# Patient Record
Sex: MALE | Race: WHITE | Employment: OTHER | URBAN - METROPOLITAN AREA
[De-identification: names, ages, dates, MRNs, and addresses within clinical notes are randomized per-mention and may not be internally consistent; named-entity substitution may affect disease eponyms.]

---

## 2020-01-17 ENCOUNTER — HOSPITAL ENCOUNTER (INPATIENT)
Age: 70
LOS: 2 days | Discharge: HOME | DRG: 812 | End: 2020-01-19
Attending: EMERGENCY MEDICINE | Admitting: INTERNAL MEDICINE
Payer: COMMERCIAL

## 2020-01-17 ENCOUNTER — APPOINTMENT (OUTPATIENT)
Dept: GENERAL RADIOLOGY | Age: 70
DRG: 812 | End: 2020-01-17
Payer: COMMERCIAL

## 2020-01-17 PROBLEM — D64.9 ANEMIA: Status: ACTIVE | Noted: 2020-01-17

## 2020-01-17 LAB
ABO/RH: NORMAL
ANION GAP SERPL CALCULATED.3IONS-SCNC: 18 MMOL/L (ref 7–16)
ANISOCYTOSIS: ABNORMAL
ANTIBODY SCREEN: NORMAL
BASOPHILS ABSOLUTE: 0.15 E9/L (ref 0–0.2)
BASOPHILS RELATIVE PERCENT: 1.7 % (ref 0–2)
BLOOD BANK DISPENSE STATUS: NORMAL
BLOOD BANK PRODUCT CODE: NORMAL
BPU ID: NORMAL
BUN BLDV-MCNC: 18 MG/DL (ref 8–23)
CALCIUM SERPL-MCNC: 9 MG/DL (ref 8.6–10.2)
CHLORIDE BLD-SCNC: 99 MMOL/L (ref 98–107)
CO2: 20 MMOL/L (ref 22–29)
CREAT SERPL-MCNC: 0.8 MG/DL (ref 0.7–1.2)
DESCRIPTION BLOOD BANK: NORMAL
EOSINOPHILS ABSOLUTE: 0.08 E9/L (ref 0.05–0.5)
EOSINOPHILS RELATIVE PERCENT: 0.9 % (ref 0–6)
FERRITIN: 20 NG/ML
GFR AFRICAN AMERICAN: >60
GFR NON-AFRICAN AMERICAN: >60 ML/MIN/1.73
GLUCOSE BLD-MCNC: 242 MG/DL (ref 74–99)
HCT VFR BLD CALC: 24.7 % (ref 37–54)
HCT VFR BLD CALC: 24.8 % (ref 37–54)
HCT VFR BLD CALC: 28.3 % (ref 37–54)
HEMOGLOBIN: 7 G/DL (ref 12.5–16.5)
HEMOGLOBIN: 8.2 G/DL (ref 12.5–16.5)
HYPOCHROMIA: ABNORMAL
IMMATURE RETIC FRACT: 31.5 % (ref 2.3–13.4)
IRON SATURATION: 9 % (ref 20–55)
IRON: 32 MCG/DL (ref 59–158)
LACTATE DEHYDROGENASE: 178 U/L (ref 135–225)
LYMPHOCYTES ABSOLUTE: 1.23 E9/L (ref 1.5–4)
LYMPHOCYTES RELATIVE PERCENT: 13.9 % (ref 20–42)
MCH RBC QN AUTO: 20.8 PG (ref 26–35)
MCHC RBC AUTO-ENTMCNC: 28.3 % (ref 32–34.5)
MCV RBC AUTO: 73.5 FL (ref 80–99.9)
METER GLUCOSE: 128 MG/DL (ref 74–99)
MONOCYTES ABSOLUTE: 0.62 E9/L (ref 0.1–0.95)
MONOCYTES RELATIVE PERCENT: 7 % (ref 2–12)
NEUTROPHILS ABSOLUTE: 6.78 E9/L (ref 1.8–7.3)
NEUTROPHILS RELATIVE PERCENT: 76.5 % (ref 43–80)
OVALOCYTES: ABNORMAL
PDW BLD-RTO: 21.4 FL (ref 11.5–15)
PLATELET # BLD: 314 E9/L (ref 130–450)
PMV BLD AUTO: 9.3 FL (ref 7–12)
POIKILOCYTES: ABNORMAL
POLYCHROMASIA: ABNORMAL
POTASSIUM REFLEX MAGNESIUM: 4.6 MMOL/L (ref 3.5–5)
PRO-BNP: 288 PG/ML (ref 0–125)
RBC # BLD: 3.36 E12/L (ref 3.8–5.8)
RETIC HGB EQUIVALENT: 18.5 PG (ref 28.2–36.6)
RETICULOCYTE ABSOLUTE COUNT: 0.06 E12/L
RETICULOCYTE COUNT PCT: 1.8 % (ref 0.4–1.9)
SODIUM BLD-SCNC: 137 MMOL/L (ref 132–146)
TARGET CELLS: ABNORMAL
TEAR DROP CELLS: ABNORMAL
TOTAL IRON BINDING CAPACITY: 351 MCG/DL (ref 250–450)
TRANSFERRIN: 285 MG/DL (ref 200–360)
TROPONIN: <0.01 NG/ML (ref 0–0.03)
WBC # BLD: 8.8 E9/L (ref 4.5–11.5)

## 2020-01-17 PROCEDURE — 85014 HEMATOCRIT: CPT

## 2020-01-17 PROCEDURE — 86923 COMPATIBILITY TEST ELECTRIC: CPT

## 2020-01-17 PROCEDURE — 6360000002 HC RX W HCPCS: Performed by: INTERNAL MEDICINE

## 2020-01-17 PROCEDURE — 96374 THER/PROPH/DIAG INJ IV PUSH: CPT

## 2020-01-17 PROCEDURE — 1200000000 HC SEMI PRIVATE

## 2020-01-17 PROCEDURE — 71045 X-RAY EXAM CHEST 1 VIEW: CPT

## 2020-01-17 PROCEDURE — 36430 TRANSFUSION BLD/BLD COMPNT: CPT

## 2020-01-17 PROCEDURE — 85025 COMPLETE CBC W/AUTO DIFF WBC: CPT

## 2020-01-17 PROCEDURE — 36415 COLL VENOUS BLD VENIPUNCTURE: CPT

## 2020-01-17 PROCEDURE — 82746 ASSAY OF FOLIC ACID SERUM: CPT

## 2020-01-17 PROCEDURE — 84484 ASSAY OF TROPONIN QUANT: CPT

## 2020-01-17 PROCEDURE — 86901 BLOOD TYPING SEROLOGIC RH(D): CPT

## 2020-01-17 PROCEDURE — 82728 ASSAY OF FERRITIN: CPT

## 2020-01-17 PROCEDURE — 99285 EMERGENCY DEPT VISIT HI MDM: CPT

## 2020-01-17 PROCEDURE — 83550 IRON BINDING TEST: CPT

## 2020-01-17 PROCEDURE — 85018 HEMOGLOBIN: CPT

## 2020-01-17 PROCEDURE — P9016 RBC LEUKOCYTES REDUCED: HCPCS

## 2020-01-17 PROCEDURE — 83880 ASSAY OF NATRIURETIC PEPTIDE: CPT

## 2020-01-17 PROCEDURE — C9113 INJ PANTOPRAZOLE SODIUM, VIA: HCPCS | Performed by: EMERGENCY MEDICINE

## 2020-01-17 PROCEDURE — 83010 ASSAY OF HAPTOGLOBIN QUANT: CPT

## 2020-01-17 PROCEDURE — 6360000002 HC RX W HCPCS: Performed by: EMERGENCY MEDICINE

## 2020-01-17 PROCEDURE — 2580000003 HC RX 258: Performed by: INTERNAL MEDICINE

## 2020-01-17 PROCEDURE — 2580000003 HC RX 258: Performed by: EMERGENCY MEDICINE

## 2020-01-17 PROCEDURE — 80048 BASIC METABOLIC PNL TOTAL CA: CPT

## 2020-01-17 PROCEDURE — 93005 ELECTROCARDIOGRAM TRACING: CPT | Performed by: EMERGENCY MEDICINE

## 2020-01-17 PROCEDURE — 83615 LACTATE (LD) (LDH) ENZYME: CPT

## 2020-01-17 PROCEDURE — 82962 GLUCOSE BLOOD TEST: CPT

## 2020-01-17 PROCEDURE — 82607 VITAMIN B-12: CPT

## 2020-01-17 PROCEDURE — 86850 RBC ANTIBODY SCREEN: CPT

## 2020-01-17 PROCEDURE — 6370000000 HC RX 637 (ALT 250 FOR IP): Performed by: INTERNAL MEDICINE

## 2020-01-17 PROCEDURE — C9113 INJ PANTOPRAZOLE SODIUM, VIA: HCPCS | Performed by: INTERNAL MEDICINE

## 2020-01-17 PROCEDURE — 84466 ASSAY OF TRANSFERRIN: CPT

## 2020-01-17 PROCEDURE — 85045 AUTOMATED RETICULOCYTE COUNT: CPT

## 2020-01-17 PROCEDURE — 83540 ASSAY OF IRON: CPT

## 2020-01-17 PROCEDURE — 2580000003 HC RX 258

## 2020-01-17 PROCEDURE — 86900 BLOOD TYPING SEROLOGIC ABO: CPT

## 2020-01-17 RX ORDER — ASPIRIN 81 MG/1
81 TABLET, CHEWABLE ORAL DAILY
Status: ON HOLD | COMMUNITY
End: 2021-11-02 | Stop reason: HOSPADM

## 2020-01-17 RX ORDER — DEXTROSE MONOHYDRATE 50 MG/ML
100 INJECTION, SOLUTION INTRAVENOUS PRN
Status: DISCONTINUED | OUTPATIENT
Start: 2020-01-17 | End: 2020-01-19 | Stop reason: HOSPADM

## 2020-01-17 RX ORDER — ENALAPRIL MALEATE 20 MG/1
20 TABLET ORAL DAILY
COMMUNITY
End: 2020-01-17 | Stop reason: DRUGHIGH

## 2020-01-17 RX ORDER — 0.9 % SODIUM CHLORIDE 0.9 %
20 INTRAVENOUS SOLUTION INTRAVENOUS ONCE
Status: DISCONTINUED | OUTPATIENT
Start: 2020-01-17 | End: 2020-01-19 | Stop reason: HOSPADM

## 2020-01-17 RX ORDER — 0.9 % SODIUM CHLORIDE 0.9 %
500 INTRAVENOUS SOLUTION INTRAVENOUS ONCE
Status: COMPLETED | OUTPATIENT
Start: 2020-01-17 | End: 2020-01-17

## 2020-01-17 RX ORDER — SODIUM CHLORIDE 9 MG/ML
INJECTION, SOLUTION INTRAVENOUS
Status: COMPLETED
Start: 2020-01-17 | End: 2020-01-17

## 2020-01-17 RX ORDER — GLIPIZIDE 10 MG/1
10 TABLET ORAL DAILY
Status: ON HOLD | COMMUNITY
End: 2021-11-02 | Stop reason: SDUPTHER

## 2020-01-17 RX ORDER — LANOLIN ALCOHOL/MO/W.PET/CERES
6 CREAM (GRAM) TOPICAL NIGHTLY
Status: DISCONTINUED | OUTPATIENT
Start: 2020-01-17 | End: 2020-01-19 | Stop reason: HOSPADM

## 2020-01-17 RX ORDER — GLIPIZIDE 5 MG/1
10 TABLET ORAL DAILY
Status: DISCONTINUED | OUTPATIENT
Start: 2020-01-18 | End: 2020-01-19 | Stop reason: HOSPADM

## 2020-01-17 RX ORDER — ONDANSETRON 2 MG/ML
4 INJECTION INTRAMUSCULAR; INTRAVENOUS EVERY 6 HOURS PRN
Status: DISCONTINUED | OUTPATIENT
Start: 2020-01-17 | End: 2020-01-19 | Stop reason: HOSPADM

## 2020-01-17 RX ORDER — ENALAPRIL MALEATE 5 MG/1
5 TABLET ORAL DAILY
Status: DISCONTINUED | OUTPATIENT
Start: 2020-01-18 | End: 2020-01-19 | Stop reason: HOSPADM

## 2020-01-17 RX ORDER — AMLODIPINE BESYLATE 10 MG/1
10 TABLET ORAL DAILY
Status: ON HOLD | COMMUNITY
End: 2021-11-02 | Stop reason: HOSPADM

## 2020-01-17 RX ORDER — SODIUM CHLORIDE 0.9 % (FLUSH) 0.9 %
10 SYRINGE (ML) INJECTION EVERY 12 HOURS SCHEDULED
Status: DISCONTINUED | OUTPATIENT
Start: 2020-01-17 | End: 2020-01-19 | Stop reason: HOSPADM

## 2020-01-17 RX ORDER — PANTOPRAZOLE SODIUM 40 MG/10ML
40 INJECTION, POWDER, LYOPHILIZED, FOR SOLUTION INTRAVENOUS DAILY
Status: DISCONTINUED | OUTPATIENT
Start: 2020-01-17 | End: 2020-01-17

## 2020-01-17 RX ORDER — ATORVASTATIN CALCIUM 10 MG/1
10 TABLET, FILM COATED ORAL NIGHTLY
Status: DISCONTINUED | OUTPATIENT
Start: 2020-01-17 | End: 2020-01-19 | Stop reason: HOSPADM

## 2020-01-17 RX ORDER — DEXTROSE MONOHYDRATE 25 G/50ML
12.5 INJECTION, SOLUTION INTRAVENOUS PRN
Status: DISCONTINUED | OUTPATIENT
Start: 2020-01-17 | End: 2020-01-19 | Stop reason: HOSPADM

## 2020-01-17 RX ORDER — ENALAPRIL MALEATE 5 MG/1
5 TABLET ORAL DAILY
Status: ON HOLD | COMMUNITY
End: 2021-11-02 | Stop reason: HOSPADM

## 2020-01-17 RX ORDER — AZATHIOPRINE 50 MG/1
50 TABLET ORAL DAILY
Status: DISCONTINUED | OUTPATIENT
Start: 2020-01-18 | End: 2020-01-19 | Stop reason: HOSPADM

## 2020-01-17 RX ORDER — METOPROLOL SUCCINATE 100 MG/1
100 TABLET, EXTENDED RELEASE ORAL DAILY
COMMUNITY

## 2020-01-17 RX ORDER — ACETAMINOPHEN 325 MG/1
650 TABLET ORAL EVERY 4 HOURS PRN
Status: DISCONTINUED | OUTPATIENT
Start: 2020-01-17 | End: 2020-01-19 | Stop reason: HOSPADM

## 2020-01-17 RX ORDER — METOPROLOL SUCCINATE 100 MG/1
100 TABLET, EXTENDED RELEASE ORAL DAILY
Status: DISCONTINUED | OUTPATIENT
Start: 2020-01-18 | End: 2020-01-19 | Stop reason: HOSPADM

## 2020-01-17 RX ORDER — NICOTINE POLACRILEX 4 MG
15 LOZENGE BUCCAL PRN
Status: DISCONTINUED | OUTPATIENT
Start: 2020-01-17 | End: 2020-01-19 | Stop reason: HOSPADM

## 2020-01-17 RX ORDER — CHOLECALCIFEROL (VITAMIN D3) 125 MCG
5 CAPSULE ORAL NIGHTLY
COMMUNITY

## 2020-01-17 RX ORDER — SODIUM CHLORIDE 9 MG/ML
INJECTION, SOLUTION INTRAVENOUS
Status: DISPENSED
Start: 2020-01-17 | End: 2020-01-18

## 2020-01-17 RX ORDER — SODIUM CHLORIDE 0.9 % (FLUSH) 0.9 %
10 SYRINGE (ML) INJECTION PRN
Status: DISCONTINUED | OUTPATIENT
Start: 2020-01-17 | End: 2020-01-19 | Stop reason: HOSPADM

## 2020-01-17 RX ORDER — AMLODIPINE BESYLATE 10 MG/1
10 TABLET ORAL DAILY
Status: DISCONTINUED | OUTPATIENT
Start: 2020-01-18 | End: 2020-01-19 | Stop reason: HOSPADM

## 2020-01-17 RX ADMIN — SODIUM CHLORIDE 8 MG/HR: 9 INJECTION, SOLUTION INTRAVENOUS at 20:54

## 2020-01-17 RX ADMIN — METFORMIN HYDROCHLORIDE 500 MG: 500 TABLET ORAL at 18:32

## 2020-01-17 RX ADMIN — SODIUM CHLORIDE: 900 INJECTION, SOLUTION INTRAVENOUS at 19:10

## 2020-01-17 RX ADMIN — SODIUM CHLORIDE 500 ML: 9 INJECTION, SOLUTION INTRAVENOUS at 15:18

## 2020-01-17 RX ADMIN — ATORVASTATIN CALCIUM 10 MG: 10 TABLET, FILM COATED ORAL at 20:55

## 2020-01-17 RX ADMIN — PANTOPRAZOLE SODIUM 40 MG: 40 INJECTION, POWDER, FOR SOLUTION INTRAVENOUS at 15:21

## 2020-01-17 RX ADMIN — MELATONIN 6 MG: at 20:55

## 2020-01-17 RX ADMIN — SODIUM CHLORIDE, PRESERVATIVE FREE 10 ML: 5 INJECTION INTRAVENOUS at 20:59

## 2020-01-17 ASSESSMENT — ENCOUNTER SYMPTOMS
NAUSEA: 0
BACK PAIN: 0
SHORTNESS OF BREATH: 1

## 2020-01-17 ASSESSMENT — PAIN SCALES - GENERAL
PAINLEVEL_OUTOF10: 0
PAINLEVEL_OUTOF10: 0

## 2020-01-17 NOTE — H&P
[facility]  Internal Medicine  -Resident History & Physical-    PCP:  Loren Herbert DO  Admitting Physician:  Faith Johnson DO  Consultants:  GI  Chief Complaint:    Chief Complaint   Patient presents with    Other     low hgb from lab draw this am-per pt 7.7       History of Present Illness  Yane Peña is a 81 y.o. male who presents to [facility] ER complaining of anemia. Yane Peña has a past medical history that includes coronary artery disease, hypertension, hyperlipidemia, non-insulin-dependent diabetes mellitus. Harrison Jones presents the ER with anemia. He states he went to see his PCP for progressive fatigue and shortness of breath with exertion and had labs drawn. He says this has been progressively worsening over the past year. He was told to go to ER due to his hemoglobin being 7.2. He does have chronic back pain and states he takes Aleve every night for the past 2 years. He has never had a colonoscopy or EGD. He does not complain of any abdominal pain, hematochezia, melena, or hematuria. He is on aspirin and Plavix secondary to coronary artery disease s/p stenting. Admits to occasional alcohol use. He states that he stopped using tobacco.  Labs today showed hemoglobin of 7.0 with MCV of 73.5. Guaiac negative in ER. ER Course  Upon presentation to the ER, routine labwork was performed which revealed hemoglobin of 7.0, MCV 73.5. Imaging results are as outlined below in the Imaging section of this note. EKG revealed normal sinus rhythm. Upon arrival to the ER, patient was 45/76. The patient received 1unit PRBC in the emergency room and was admitted to telemetry under the care of Dr. Nacho Caruso.     Last Hospital Admission -   None in EMR    Last Echocardiogram -  None in EMR     ED TRIAGE VITALS  BP: 139/68, Temp: 98.4 °F (36.9 °C), Pulse: 83, Resp: 14, SpO2: 94 %    Vitals:    01/17/20 1421 01/17/20 1632   BP: (!) 145/76 139/68   Pulse: 97 83   Resp: 18 14   Temp: 450 E9/L    MPV 9.3 7.0 - 12.0 fL    Neutrophils % 76.5 43.0 - 80.0 %    Lymphocytes % 13.9 (L) 20.0 - 42.0 %    Monocytes % 7.0 2.0 - 12.0 %    Eosinophils % 0.9 0.0 - 6.0 %    Basophils % 1.7 0.0 - 2.0 %    Neutrophils Absolute 6.78 1.80 - 7.30 E9/L    Lymphocytes Absolute 1.23 (L) 1.50 - 4.00 E9/L    Monocytes Absolute 0.62 0.10 - 0.95 E9/L    Eosinophils Absolute 0.08 0.05 - 0.50 E9/L    Basophils Absolute 0.15 0.00 - 0.20 E9/L    Anisocytosis 2+     Polychromasia 2+     Hypochromia 3+     Poikilocytes 1+     Ovalocytes 1+     Target Cells 1+     Tear Drop Cells 1+    Basic Metabolic Panel w/ Reflex to MG    Collection Time: 01/17/20  2:50 PM   Result Value Ref Range    Sodium 137 132 - 146 mmol/L    Potassium reflex Magnesium 4.6 3.5 - 5.0 mmol/L    Chloride 99 98 - 107 mmol/L    CO2 20 (L) 22 - 29 mmol/L    Anion Gap 18 (H) 7 - 16 mmol/L    Glucose 242 (H) 74 - 99 mg/dL    BUN 18 8 - 23 mg/dL    CREATININE 0.8 0.7 - 1.2 mg/dL    GFR Non-African American >60 >=60 mL/min/1.73    GFR African American >60     Calcium 9.0 8.6 - 10.2 mg/dL   Troponin    Collection Time: 01/17/20  2:50 PM   Result Value Ref Range    Troponin <0.01 0.00 - 0.03 ng/mL   Brain Natriuretic Peptide    Collection Time: 01/17/20  2:50 PM   Result Value Ref Range    Pro- (H) 0 - 125 pg/mL   TYPE AND SCREEN    Collection Time: 01/17/20  2:50 PM   Result Value Ref Range    ABO/Rh A POS     Antibody Screen NEG    PREPARE RBC (CROSSMATCH), 1 Units    Collection Time: 01/17/20  2:50 PM   Result Value Ref Range    Product Code Blood Bank [id number]     Description Blood Bank Red Blood Cells, Leuko-reduced     Unit Number [id number]     Dispense Status Blood Bank issued    EKG 12 Lead    Collection Time: 01/17/20  3:01 PM   Result Value Ref Range    Ventricular Rate 85 BPM    Atrial Rate 85 BPM    P-R Interval 162 ms    QRS Duration 76 ms    Q-T Interval 372 ms    QTc Calculation (Bazett) 442 ms    P Axis 66 degrees    R Axis 66 degrees    T Axis 73 degrees       Imaging  Xr Chest Portable    Result Date: 1/17/2020  Location:200 Exam: XR CHEST PORTABLE Comparison: 5/22/2013 History: Dyspnea Findings: Portable AP upright chest. Heart size is normal. Pulmonary vessels are nondistended. No focal pulmonary parenchymal consolidation. No acute cardiopulmonary disease. Assessment and Plan  Patient is a 81 y.o. male who presented with fatigue and SOB on exertion. The active problem list is as follows:    · Anemia, possible GI source, with frequent Aleve use  · Coronary artery disease, on aspirin and plavix  · Hypertension  · Hyperlipidemia  · Non insulin dependent diabetes mellitus    -Patient has never had EGD or colonoscopy  -Hgb 7.0 on presentation  -1 units PRBC given in ED  -H&H q6 for now  -Transfuse as necessary (hgb <7)  -NPO  -Protonix drip  -Consult gastroenterology  -Last plavix 1/17    · Routine labs in the morning. · DVT prophylaxis. SCD  · Please see orders for further management and care. The plan to be discussed with Dr. Camryn Morocho. [facility] Tova OSORIO, PGYIII  4:40 PM  1/17/2020    The chart was reviewed and the patient was seen and examined. The case was discussed in detail with the resident and agree with current impressions and plan.     Yomaira Dupree D.O.  5:20 PM  1/17/2020

## 2020-01-17 NOTE — ED NOTES
Patient has no complaints or symptoms at this time.  Here for low Hgb, sent in by PCP       Pasquale Mays RN  01/17/20 9263

## 2020-01-17 NOTE — ED PROVIDER NOTES
and nursing note reviewed. Constitutional:       General: He is not in acute distress. Appearance: He is well-developed. HENT:      Head: Normocephalic and atraumatic. Mouth/Throat:      Mouth: Mucous membranes are dry. Eyes:      General: No scleral icterus. Extraocular Movements: Extraocular movements intact. Pupils: Pupils are equal, round, and reactive to light. Neck:      Musculoskeletal: Normal range of motion. Vascular: No JVD. Cardiovascular:      Rate and Rhythm: Normal rate and regular rhythm. Heart sounds: No murmur. Pulmonary:      Effort: Pulmonary effort is normal.      Breath sounds: Normal breath sounds. No wheezing, rhonchi or rales. Chest:      Chest wall: No tenderness. Abdominal:      General: There is no distension. Palpations: Abdomen is soft. Tenderness: There is no tenderness. There is no guarding or rebound. Hernia: No hernia is present. Genitourinary:     Rectum: Guaiac result negative. Comments: Brown stool  Musculoskeletal:      Right lower leg: No edema. Left lower leg: No edema. Skin:     General: Skin is warm and dry. Capillary Refill: Capillary refill takes less than 2 seconds. Neurological:      General: No focal deficit present. Mental Status: He is alert and oriented to person, place, and time. Cranial Nerves: No cranial nerve deficit. Psychiatric:         Behavior: Behavior normal.          Procedures     MDM  Number of Diagnoses or Management Options  Anemia, unspecified type:   Diagnosis management comments: Patient presented to the ED at the discretion of his PCP after he was found to have a low hemoglobin. Patient had been complaining of shortness of breath and fatigue for the past several days. Patient had a hemoglobin of 7.0 and was treated with one unit of pRBCs. The patient was guiac negative and was also given IV fluids and protonix. Case was discussed with Dr. Duane Gals.   His wife stated that the patient did have a slightly elevated d-dimer at 294 although I suspect that his clinical picture is more consistent with anemia in his fatigue rather than a pulmonary embolism. --------------------------------------------- PAST HISTORY ---------------------------------------------  Past Medical History:  has a past medical history of Diabetes mellitus ([facility] [address] Hepatitis C antibody test positive, Hyperlipidemia, Hypertension, and MI, old. Past Surgical History:  has a past surgical history that includes Coronary angioplasty with stent. Social History:  reports that he has quit smoking. He smoked 1.00 pack per day. He does not have any smokeless tobacco history on file. He reports current alcohol use. Family History: family history is not on file. The patients home medications have been reviewed.     Allergies: Codeine    -------------------------------------------------- RESULTS -------------------------------------------------    LABS:  Results for orders placed or performed during the hospital encounter of 01/17/20   CBC Auto Differential   Result Value Ref Range    WBC 8.8 4.5 - 11.5 E9/L    RBC 3.36 (L) 3.80 - 5.80 E12/L    Hemoglobin 7.0 (L) 12.5 - 16.5 g/dL    Hematocrit 24.7 (L) 37.0 - 54.0 %    MCV 73.5 (L) 80.0 - 99.9 fL    MCH 20.8 (L) 26.0 - 35.0 pg    MCHC 28.3 (L) 32.0 - 34.5 %    RDW 21.4 (H) 11.5 - 15.0 fL    Platelets 547 939 - 503 E9/L    MPV 9.3 7.0 - 12.0 fL    Neutrophils % 76.5 43.0 - 80.0 %    Lymphocytes % 13.9 (L) 20.0 - 42.0 %    Monocytes % 7.0 2.0 - 12.0 %    Eosinophils % 0.9 0.0 - 6.0 %    Basophils % 1.7 0.0 - 2.0 %    Neutrophils Absolute 6.78 1.80 - 7.30 E9/L    Lymphocytes Absolute 1.23 (L) 1.50 - 4.00 E9/L    Monocytes Absolute 0.62 0.10 - 0.95 E9/L    Eosinophils Absolute 0.08 0.05 - 0.50 E9/L    Basophils Absolute 0.15 0.00 - 0.20 E9/L    Anisocytosis 2+     Polychromasia 2+     Hypochromia 3+     Poikilocytes 1+     Ovalocytes 1+

## 2020-01-18 ENCOUNTER — ANESTHESIA EVENT (OUTPATIENT)
Dept: ENDOSCOPY | Age: 70
DRG: 812 | End: 2020-01-18
Payer: COMMERCIAL

## 2020-01-18 LAB
ALBUMIN SERPL-MCNC: 3.5 G/DL (ref 3.5–5.2)
ALP BLD-CCNC: 196 U/L (ref 40–129)
ALT SERPL-CCNC: 22 U/L (ref 0–40)
ANION GAP SERPL CALCULATED.3IONS-SCNC: 12 MMOL/L (ref 7–16)
APTT: 27.8 SEC (ref 24.5–35.1)
AST SERPL-CCNC: 49 U/L (ref 0–39)
BILIRUB SERPL-MCNC: 1.1 MG/DL (ref 0–1.2)
BUN BLDV-MCNC: 11 MG/DL (ref 8–23)
CALCIUM SERPL-MCNC: 8.7 MG/DL (ref 8.6–10.2)
CHLORIDE BLD-SCNC: 102 MMOL/L (ref 98–107)
CHOLESTEROL, TOTAL: 218 MG/DL (ref 0–199)
CO2: 24 MMOL/L (ref 22–29)
CREAT SERPL-MCNC: 0.7 MG/DL (ref 0.7–1.2)
FOLATE: 5.9 NG/ML (ref 4.8–24.2)
FOLATE: 6.6 NG/ML (ref 4.8–24.2)
GFR AFRICAN AMERICAN: >60
GFR NON-AFRICAN AMERICAN: >60 ML/MIN/1.73
GLUCOSE BLD-MCNC: 102 MG/DL (ref 74–99)
HAPTOGLOBIN: 137 MG/DL (ref 30–200)
HAPTOGLOBIN: 140 MG/DL (ref 30–200)
HBA1C MFR BLD: 6.8 % (ref 4–5.6)
HBA1C MFR BLD: 6.8 % (ref 4–5.6)
HCT VFR BLD CALC: 26.7 % (ref 37–54)
HCT VFR BLD CALC: 27 % (ref 37–54)
HCT VFR BLD CALC: 27.1 % (ref 37–54)
HCT VFR BLD CALC: 27.7 % (ref 37–54)
HDLC SERPL-MCNC: 37 MG/DL
HEMOGLOBIN: 7.9 G/DL (ref 12.5–16.5)
HEMOGLOBIN: 8 G/DL (ref 12.5–16.5)
INR BLD: 1
LDL CHOLESTEROL CALCULATED: 150 MG/DL (ref 0–99)
MAGNESIUM: 1.7 MG/DL (ref 1.6–2.6)
MCH RBC QN AUTO: 21.9 PG (ref 26–35)
MCHC RBC AUTO-ENTMCNC: 29.6 % (ref 32–34.5)
MCV RBC AUTO: 74.2 FL (ref 80–99.9)
METER GLUCOSE: 101 MG/DL (ref 74–99)
METER GLUCOSE: 102 MG/DL (ref 74–99)
METER GLUCOSE: 83 MG/DL (ref 74–99)
PDW BLD-RTO: 21.4 FL (ref 11.5–15)
PHOSPHORUS: 3 MG/DL (ref 2.5–4.5)
PLATELET # BLD: 253 E9/L (ref 130–450)
PMV BLD AUTO: 9.3 FL (ref 7–12)
POTASSIUM SERPL-SCNC: 4.1 MMOL/L (ref 3.5–5)
PROTHROMBIN TIME: 11.4 SEC (ref 9.3–12.4)
RBC # BLD: 3.6 E12/L (ref 3.8–5.8)
SODIUM BLD-SCNC: 138 MMOL/L (ref 132–146)
TOTAL PROTEIN: 7.2 G/DL (ref 6.4–8.3)
TRIGL SERPL-MCNC: 156 MG/DL (ref 0–149)
TSH SERPL DL<=0.05 MIU/L-ACNC: 2.71 UIU/ML (ref 0.27–4.2)
VITAMIN B-12: 343 PG/ML (ref 211–946)
VITAMIN B-12: 373 PG/ML (ref 211–946)
VLDLC SERPL CALC-MCNC: 31 MG/DL
WBC # BLD: 6.8 E9/L (ref 4.5–11.5)

## 2020-01-18 PROCEDURE — 84443 ASSAY THYROID STIM HORMONE: CPT

## 2020-01-18 PROCEDURE — 80053 COMPREHEN METABOLIC PANEL: CPT

## 2020-01-18 PROCEDURE — 85014 HEMATOCRIT: CPT

## 2020-01-18 PROCEDURE — 6370000000 HC RX 637 (ALT 250 FOR IP): Performed by: INTERNAL MEDICINE

## 2020-01-18 PROCEDURE — 6360000002 HC RX W HCPCS: Performed by: INTERNAL MEDICINE

## 2020-01-18 PROCEDURE — 82607 VITAMIN B-12: CPT

## 2020-01-18 PROCEDURE — 82962 GLUCOSE BLOOD TEST: CPT

## 2020-01-18 PROCEDURE — 84100 ASSAY OF PHOSPHORUS: CPT

## 2020-01-18 PROCEDURE — 85730 THROMBOPLASTIN TIME PARTIAL: CPT

## 2020-01-18 PROCEDURE — 83010 ASSAY OF HAPTOGLOBIN QUANT: CPT

## 2020-01-18 PROCEDURE — 2580000003 HC RX 258: Performed by: INTERNAL MEDICINE

## 2020-01-18 PROCEDURE — 85027 COMPLETE CBC AUTOMATED: CPT

## 2020-01-18 PROCEDURE — 83735 ASSAY OF MAGNESIUM: CPT

## 2020-01-18 PROCEDURE — C9113 INJ PANTOPRAZOLE SODIUM, VIA: HCPCS | Performed by: INTERNAL MEDICINE

## 2020-01-18 PROCEDURE — 80061 LIPID PANEL: CPT

## 2020-01-18 PROCEDURE — 1200000000 HC SEMI PRIVATE

## 2020-01-18 PROCEDURE — 85610 PROTHROMBIN TIME: CPT

## 2020-01-18 PROCEDURE — 85018 HEMOGLOBIN: CPT

## 2020-01-18 PROCEDURE — 36415 COLL VENOUS BLD VENIPUNCTURE: CPT

## 2020-01-18 PROCEDURE — 83036 HEMOGLOBIN GLYCOSYLATED A1C: CPT

## 2020-01-18 PROCEDURE — 82746 ASSAY OF FOLIC ACID SERUM: CPT

## 2020-01-18 RX ORDER — NICOTINE POLACRILEX 4 MG
15 LOZENGE BUCCAL PRN
Status: DISCONTINUED | OUTPATIENT
Start: 2020-01-18 | End: 2020-01-19 | Stop reason: HOSPADM

## 2020-01-18 RX ORDER — DEXTROSE MONOHYDRATE 50 MG/ML
100 INJECTION, SOLUTION INTRAVENOUS PRN
Status: DISCONTINUED | OUTPATIENT
Start: 2020-01-18 | End: 2020-01-19 | Stop reason: HOSPADM

## 2020-01-18 RX ORDER — TRAMADOL HYDROCHLORIDE 50 MG/1
50 TABLET ORAL EVERY 6 HOURS PRN
Status: DISCONTINUED | OUTPATIENT
Start: 2020-01-18 | End: 2020-01-19 | Stop reason: HOSPADM

## 2020-01-18 RX ORDER — KETOROLAC TROMETHAMINE 15 MG/ML
15 INJECTION, SOLUTION INTRAMUSCULAR; INTRAVENOUS EVERY 6 HOURS PRN
Status: DISCONTINUED | OUTPATIENT
Start: 2020-01-18 | End: 2020-01-19 | Stop reason: HOSPADM

## 2020-01-18 RX ORDER — DEXTROSE MONOHYDRATE 25 G/50ML
12.5 INJECTION, SOLUTION INTRAVENOUS PRN
Status: DISCONTINUED | OUTPATIENT
Start: 2020-01-18 | End: 2020-01-19 | Stop reason: HOSPADM

## 2020-01-18 RX ORDER — SODIUM CHLORIDE 0.9 % (FLUSH) 0.9 %
10 SYRINGE (ML) INJECTION PRN
Status: DISCONTINUED | OUTPATIENT
Start: 2020-01-18 | End: 2020-01-19 | Stop reason: HOSPADM

## 2020-01-18 RX ORDER — SODIUM CHLORIDE 0.9 % (FLUSH) 0.9 %
10 SYRINGE (ML) INJECTION EVERY 12 HOURS SCHEDULED
Status: DISCONTINUED | OUTPATIENT
Start: 2020-01-18 | End: 2020-01-19 | Stop reason: HOSPADM

## 2020-01-18 RX ADMIN — TRAMADOL HYDROCHLORIDE 50 MG: 50 TABLET, FILM COATED ORAL at 17:59

## 2020-01-18 RX ADMIN — SODIUM CHLORIDE 8 MG/HR: 9 INJECTION, SOLUTION INTRAVENOUS at 13:44

## 2020-01-18 RX ADMIN — MELATONIN 6 MG: at 21:27

## 2020-01-18 RX ADMIN — METOPROLOL SUCCINATE 100 MG: 100 TABLET, EXTENDED RELEASE ORAL at 08:00

## 2020-01-18 RX ADMIN — ENALAPRIL MALEATE 5 MG: 5 TABLET ORAL at 08:00

## 2020-01-18 RX ADMIN — METFORMIN HYDROCHLORIDE 500 MG: 500 TABLET ORAL at 08:00

## 2020-01-18 RX ADMIN — AZATHIOPRINE 50 MG: 50 TABLET ORAL at 08:01

## 2020-01-18 RX ADMIN — SODIUM CHLORIDE 8 MG/HR: 9 INJECTION, SOLUTION INTRAVENOUS at 03:00

## 2020-01-18 RX ADMIN — ATORVASTATIN CALCIUM 10 MG: 10 TABLET, FILM COATED ORAL at 21:27

## 2020-01-18 RX ADMIN — GLIPIZIDE 10 MG: 5 TABLET ORAL at 08:00

## 2020-01-18 RX ADMIN — AMLODIPINE BESYLATE 10 MG: 10 TABLET ORAL at 08:00

## 2020-01-18 RX ADMIN — ACETAMINOPHEN 650 MG: 325 TABLET, FILM COATED ORAL at 08:04

## 2020-01-18 ASSESSMENT — PAIN SCALES - GENERAL
PAINLEVEL_OUTOF10: 4
PAINLEVEL_OUTOF10: 0
PAINLEVEL_OUTOF10: 8
PAINLEVEL_OUTOF10: 2
PAINLEVEL_OUTOF10: 3
PAINLEVEL_OUTOF10: 5

## 2020-01-18 ASSESSMENT — PAIN DESCRIPTION - PAIN TYPE: TYPE: ACUTE PAIN

## 2020-01-18 ASSESSMENT — PAIN DESCRIPTION - LOCATION: LOCATION: ANKLE

## 2020-01-18 NOTE — PROGRESS NOTES
than 10 feet. Temperature 98.7 with a heart rate 84. Blood pressure 137/66 but was as high as 177/76 yesterday afternoon. O2 sats 94% on room air. Hemoglobin still 7.9 with a serum iron of 82. Last Hospital Admission -   None in EMR    Last Echocardiogram -  None in EMR     ED TRIAGE VITALS  BP: 137/66, Temp: 98.7 °F (37.1 °C), Pulse: 84, Resp: 18, SpO2: 94 %    Vitals:    01/17/20 1729 01/17/20 2045 01/18/20 0601 01/18/20 0830   BP: (!) 148/70 (!) 177/76  137/66   Pulse: 83 97  84   Resp: 18 20 18   Temp: 98.1 °F (36.7 °C) 98.2 °F (36.8 °C)  98.7 °F (37.1 °C)   TempSrc: Oral      SpO2: 94% 94%  94%   Weight: 188 lb (85.3 kg)  188 lb 12.8 oz (85.6 kg)    Height: 5' 8\" (1.727 m)            Histories  Past Medical History:   Diagnosis Date    Diabetes mellitus ([facility] [address]     Hepatitis C antibody test positive     Hyperlipidemia     Hypertension     MI, old      Past Surgical History:   Procedure Laterality Date    CORONARY ANGIOPLASTY WITH STENT PLACEMENT       History reviewed. No pertinent family history. Home Medications  Prior to Admission medications    Medication Sig Start Date End Date Taking?  Authorizing Provider   aspirin 81 MG chewable tablet Take 81 mg by mouth daily   Yes Historical Provider, MD   glipiZIDE (GLUCOTROL) 10 MG tablet Take 10 mg by mouth daily    Yes Historical Provider, MD   amLODIPine (NORVASC) 10 MG tablet Take 10 mg by mouth daily   Yes Historical Provider, MD   enalapril (VASOTEC) 5 MG tablet Take 5 mg by mouth daily   Yes Historical Provider, MD   metoprolol succinate (TOPROL XL) 100 MG extended release tablet Take 100 mg by mouth daily   Yes Historical Provider, MD   melatonin 5 MG TABS tablet Take 5 mg by mouth nightly   Yes Historical Provider, MD   Multiple Vitamin (MULTI-VITAMIN DAILY PO) Take 1 tablet by mouth daily    Yes Historical Provider, MD   Coenzyme Q10 (COQ-10) 100 MG CAPS Take 100 mg by mouth nightly    Yes Historical Provider, MD   metformin (GLUCOPHAGE) 500 MG tablet Take 500 mg by mouth 2 times daily (with meals). Yes Historical Provider, MD   omeprazole (PRILOSEC) 20 MG capsule Take 20 mg by mouth daily. Yes Historical Provider, MD   azaTHIOprine (IMURAN) 50 MG tablet Take 50 mg by mouth daily. Yes Historical Provider, MD   clopidogrel (PLAVIX) 75 MG tablet Take 75 mg by mouth daily.      Yes Historical Provider, MD   atorvastatin (LIPITOR) 10 MG tablet Take 10 mg by mouth nightly    Yes Historical Provider, MD       Allergies  Codeine    Social Hx  Social History     Socioeconomic History    Marital status:      Spouse name: Not on file    Number of children: Not on file    Years of education: Not on file    Highest education level: Not on file   Occupational History    Not on file   Social Needs    Financial resource strain: Not on file    Food insecurity:     Worry: Not on file     Inability: Not on file    Transportation needs:     Medical: Not on file     Non-medical: Not on file   Tobacco Use    Smoking status: Former Smoker     Packs/day: 1.00   Substance and Sexual Activity    Alcohol use: Yes     Comment: occasional    Drug use: Not on file    Sexual activity: Not on file   Lifestyle    Physical activity:     Days per week: Not on file     Minutes per session: Not on file    Stress: Not on file   Relationships    Social connections:     Talks on phone: Not on file     Gets together: Not on file     Attends Tenriism service: Not on file     Active member of club or organization: Not on file     Attends meetings of clubs or organizations: Not on file     Relationship status: Not on file    Intimate partner violence:     Fear of current or ex partner: Not on file     Emotionally abused: Not on file     Physically abused: Not on file     Forced sexual activity: Not on file   Other Topics Concern    Not on file   Social History Narrative    Not on file       Review of Systems  All bolded are positive; please see HPI  General: Fever, chills, diaphoresis, fatigue, malaise, night sweats, weight loss  Psychological:  Anxiety, disorientation, hallucinations. ENT:  Epistaxis, headaches, vertigo, visual changes. Cardiovascular:  Chest pain, irregular heartbeats, palpitations, paroxysmal nocturnal dyspnea. Respiratory:  Shortness of breath, coughing, sputum production, hemoptysis, wheezing, orthopnea. Gastrointestinal:  Nausea, vomiting, diarrhea, heartburn, constipation, abdominal pain, hematemesis, hematochezia, melena, acholic stools  Genito-Urinary:  Dysuria, urgency, frequency, hematuria  Musculoskeletal:  Joint pain, joint stiffness, joint swelling, muscle pain  Neurology:  Headache, focal neurological deficits, weakness, numbness, paresthesia  Derm:  Rashes, ulcers, excoriations, bruising  Extremities:  Decreased ROM, peripheral edema, mottling    Physical Examination  Vitals:  /66   Pulse 84   Temp 98.7 °F (37.1 °C)   Resp 18   Ht 5' 8\" (1.727 m)   Wt 188 lb 12.8 oz (85.6 kg)   SpO2 94%   BMI 28.71 kg/m²   General Appearance:  awake, alert, and oriented to person, place, time, and purpose; appears stated age and cooperative; no apparent distress no labored breathing  HEENT:  NCAT; PERRL; conjunctiva pink, sclera clear  Neck:  no adenopathy, bruit, JVD, tenderness, masses, or nodules; supple, symmetrical, trachea midline, thyroid not enlarged  Lung:  clear to auscultation bilaterally; no use of accessory muscles; no rhonchi, rales, or crackles  Heart:  regular rate and regular rhythm without murmur, rub, or gallop  Abdomen:  soft, nontender, nondistended; normoactive bowel sounds; no organomegaly  Extremities:  extremities normal, atraumatic, no cyanosis or edema  Musculokeletal:  no joint swelling, no muscle tenderness. ROM normal in all joints of extremities.    Neurologic:  mental status A&Ox3, thought content appropriate; CN II-XII grossly intact; sensation intact, motor strength 5/5 globally; no slurred Lipid Panel    Collection Time: 01/18/20  7:41 AM   Result Value Ref Range    Cholesterol, Total 218 (H) 0 - 199 mg/dL    Triglycerides 156 (H) 0 - 149 mg/dL    HDL 37 >40 mg/dL    LDL Calculated 150 (H) 0 - 99 mg/dL    VLDL Cholesterol Calculated 31 mg/dL   Magnesium    Collection Time: 01/18/20  7:41 AM   Result Value Ref Range    Magnesium 1.7 1.6 - 2.6 mg/dL   Phosphorus    Collection Time: 01/18/20  7:41 AM   Result Value Ref Range    Phosphorus 3.0 2.5 - 4.5 mg/dL   TSH without Reflex    Collection Time: 01/18/20  7:41 AM   Result Value Ref Range    TSH 2.710 0.270 - 4.200 uIU/mL   Comprehensive Metabolic Panel    Collection Time: 01/18/20  7:41 AM   Result Value Ref Range    Sodium 138 132 - 146 mmol/L    Potassium 4.1 3.5 - 5.0 mmol/L    Chloride 102 98 - 107 mmol/L    CO2 24 22 - 29 mmol/L    Anion Gap 12 7 - 16 mmol/L    Glucose 102 (H) 74 - 99 mg/dL    BUN 11 8 - 23 mg/dL    CREATININE 0.7 0.7 - 1.2 mg/dL    GFR Non-African American >60 >=60 mL/min/1.73    GFR African American >60     Calcium 8.7 8.6 - 10.2 mg/dL    Total Protein 7.2 6.4 - 8.3 g/dL    Alb 3.5 3.5 - 5.2 g/dL    Total Bilirubin 1.1 0.0 - 1.2 mg/dL    Alkaline Phosphatase 196 (H) 40 - 129 U/L    ALT 22 0 - 40 U/L    AST 49 (H) 0 - 39 U/L   CBC    Collection Time: 01/18/20  7:41 AM   Result Value Ref Range    WBC 6.8 4.5 - 11.5 E9/L    RBC 3.60 (L) 3.80 - 5.80 E12/L    Hemoglobin 7.9 (L) 12.5 - 16.5 g/dL    Hematocrit 26.7 (L) 37.0 - 54.0 %    MCV 74.2 (L) 80.0 - 99.9 fL    MCH 21.9 (L) 26.0 - 35.0 pg    MCHC 29.6 (L) 32.0 - 34.5 %    RDW 21.4 (H) 11.5 - 15.0 fL    Platelets 702 291 - 225 E9/L    MPV 9.3 7.0 - 12.0 fL   Protime-INR    Collection Time: 01/18/20  7:41 AM   Result Value Ref Range    Protime 11.4 9.3 - 12.4 sec    INR 1.0    APTT    Collection Time: 01/18/20  7:41 AM   Result Value Ref Range    aPTT 27.8 24.5 - 35.1 sec   Hemoglobin and Hematocrit, Blood    Collection Time: 01/18/20 11:38 AM   Result Value Ref Range Hemoglobin 7.9 (L) 12.5 - 16.5 g/dL    Hematocrit 27.1 (L) 37.0 - 54.0 %       Imaging  Xr Chest Portable    Result Date: 1/17/2020  Location:200 Exam: XR CHEST PORTABLE Comparison: 5/22/2013 History: Dyspnea Findings: Portable AP upright chest. Heart size is normal. Pulmonary vessels are nondistended. No focal pulmonary parenchymal consolidation. No acute cardiopulmonary disease. Assessment and Plan  Patient is a 81 y.o. male who presented with fatigue and SOB on exertion. The active problem list is as follows:    · Anemia, possible GI source, with frequent Aleve use  · Coronary artery disease, on aspirin and plavix  · Hypertension  · Hyperlipidemia  · Non insulin dependent diabetes mellitus    -Patient has never had EGD or colonoscopy  -Hgb 7.0 on presentation  -1 units PRBC given in ED  -Repeat H&H at 8 PM today and in a.m.-there is no signs of active GI bleeding  -Transfuse as necessary (hgb <7)  -NPO  -Protonix drip  -Consult gastroenterology  -Last plavix 1/17    · Routine labs in the morning. · DVT prophylaxis. SCD  · Please see orders for further management and care.   Tavon Ortiz DO  12:42 PM  1/18/2020

## 2020-01-18 NOTE — ANESTHESIA PRE PROCEDURE
Department of Anesthesiology  Preprocedure Note       Name:  Lisa Mendieta   Age:  81 y.o.  :  1950                                          MRN:  [id number]         Date:  2020      Surgeon: Belle Collins):  Janessa Poole DO    Procedure: EGD ESOPHAGOGASTRODUODENOSCOPY (N/A )    Medications prior to admission:   Prior to Admission medications    Medication Sig Start Date End Date Taking? Authorizing Provider   aspirin 81 MG chewable tablet Take 81 mg by mouth daily   Yes Historical Provider, MD   glipiZIDE (GLUCOTROL) 10 MG tablet Take 10 mg by mouth daily    Yes Historical Provider, MD   amLODIPine (NORVASC) 10 MG tablet Take 10 mg by mouth daily   Yes Historical Provider, MD   enalapril (VASOTEC) 5 MG tablet Take 5 mg by mouth daily   Yes Historical Provider, MD   metoprolol succinate (TOPROL XL) 100 MG extended release tablet Take 100 mg by mouth daily   Yes Historical Provider, MD   melatonin 5 MG TABS tablet Take 5 mg by mouth nightly   Yes Historical Provider, MD   Multiple Vitamin (MULTI-VITAMIN DAILY PO) Take 1 tablet by mouth daily    Yes Historical Provider, MD   Coenzyme Q10 (COQ-10) 100 MG CAPS Take 100 mg by mouth nightly    Yes Historical Provider, MD   metformin (GLUCOPHAGE) 500 MG tablet Take 500 mg by mouth 2 times daily (with meals). Yes Historical Provider, MD   omeprazole (PRILOSEC) 20 MG capsule Take 20 mg by mouth daily. Yes Historical Provider, MD   azaTHIOprine (IMURAN) 50 MG tablet Take 50 mg by mouth daily. Yes Historical Provider, MD   clopidogrel (PLAVIX) 75 MG tablet Take 75 mg by mouth daily.      Yes Historical Provider, MD   atorvastatin (LIPITOR) 10 MG tablet Take 10 mg by mouth nightly    Yes Historical Provider, MD       Current medications:    Current Facility-Administered Medications   Medication Dose Route Frequency Provider Last Rate Last Dose    glucose (GLUTOSE) 40 % oral gel 15 g  15 g Oral PRN Randy Loya DO        dextrose 50 % IV solution  12.5 g Intravenous PRN Omar Jimenez, DO        glucagon (rDNA) injection 1 mg  1 mg Intramuscular PRN Omar Jimenez, DO        dextrose 5 % solution  100 mL/hr Intravenous PRN Omar Jimenez, DO        insulin lispro (HUMALOG) injection vial 0-12 Units  0-12 Units Subcutaneous TID  Omar Jimenez, DO        insulin lispro (HUMALOG) injection vial 0-6 Units  0-6 Units Subcutaneous Nightly Omar Jimenez, DO        traMADol (ULTRAM) tablet 50 mg  50 mg Oral Q6H PRN Omar Jimenez, DO   50 mg at 01/18/20 1759    ketorolac (TORADOL) injection 15 mg  15 mg Intravenous Q6H PRN Omar Jimenez, DO        sodium chloride flush 0.9 % injection 10 mL  10 mL Intravenous 2 times per day Thadeus Dapash, DO        sodium chloride flush 0.9 % injection 10 mL  10 mL Intravenous PRN Thadeus Dapash, DO        0.9 % sodium chloride bolus  20 mL Intravenous Once Elwyn Gaby, DO        amLODIPine (NORVASC) tablet 10 mg  10 mg Oral Daily [facility] Tova, DO   10 mg at 01/18/20 0800    atorvastatin (LIPITOR) tablet 10 mg  10 mg Oral Nightly [facility] Tova, DO   10 mg at 01/17/20 2055    azaTHIOprine (IMURAN) tablet 50 mg  50 mg Oral Daily [facility] Tova, DO   50 mg at 01/18/20 0801    enalapril (VASOTEC) tablet 5 mg  5 mg Oral Daily [facility] Tova, DO   5 mg at 01/18/20 0800    glipiZIDE (GLUCOTROL) tablet 10 mg  10 mg Oral Daily [facility] Tova, DO   10 mg at 01/18/20 0800    melatonin tablet 6 mg  6 mg Oral Nightly [facility] Tova, DO   6 mg at 01/17/20 2055    metFORMIN (GLUCOPHAGE) tablet 500 mg  500 mg Oral BID [facility] Tova, DO   500 mg at 01/18/20 0800    metoprolol succinate (TOPROL XL) extended release tablet 100 mg  100 mg Oral Daily [facility] Tova, DO   100 mg at 01/18/20 0800    pantoprazole (PROTONIX) 80 mg in sodium chloride 0.9 % 100 mL infusion  8 mg/hr Intravenous Continuous [facility] Tova, DO 10 mL/hr at 01/18/20 1344 8 mg/hr at 01/18/20 1344    sodium chloride flush 0.9 % injection 10 mL  10 mL Intravenous 2 times per day Jacqueline Banda, DO   10 mL at 01/17/20 2059    sodium chloride flush 0.9 % injection 10 mL  10 mL Intravenous PRN Jacqueline Banda, DO        ondansetron [facility]) injection 4 mg  4 mg Intravenous Q6H PRN Emmanuela aDnielman, DO        acetaminophen (TYLENOL) tablet 650 mg  650 mg Oral Q4H PRN Maggy Bernardo, DO   650 mg at 01/18/20 0804    glucose (GLUTOSE) 40 % oral gel 15 g  15 g Oral PRN Lacona Robyn, DO        dextrose 50 % IV solution  12.5 g Intravenous PRN [place]ona [place], DO        glucagon (rDNA) injection 1 mg  1 mg Intramuscular PRN [place]ona [place], DO        dextrose 5 % solution  100 mL/hr Intravenous PRN Lacona [place], DO           Allergies:     Allergies   Allergen Reactions    Codeine        Problem List:    Patient Active Problem List   Diagnosis Code    Anemia D64.9       Past Medical History:        Diagnosis Date    Diabetes mellitus ([facility] [address]     Hepatitis C antibody test positive     Hyperlipidemia     Hypertension     MI, old        Past Surgical History:        Procedure Laterality Date    CORONARY ANGIOPLASTY WITH STENT PLACEMENT         Social History:    Social History     Tobacco Use    Smoking status: Former Smoker     Packs/day: 1.00   Substance Use Topics    Alcohol use: Yes     Comment: occasional                                Counseling given: Not Answered      Vital Signs (Current):   Vitals:    01/17/20 1729 01/17/20 2045 01/18/20 0601 01/18/20 0830   BP: (!) 148/70 (!) 177/76  137/66   Pulse: 83 97  84   Resp: 18 20 18   Temp: 98.1 °F (36.7 °C) 98.2 °F (36.8 °C)  98.7 °F (37.1 °C)   TempSrc: Oral      SpO2: 94% 94%  94%   Weight: 188 lb (85.3 kg)  188 lb 12.8 oz (85.6 kg)    Height: 5' 8\" (1.727 m)                                                 BP Readings from Last 3 Encounters:   01/18/20 137/66       NPO Status: blood dyscrasia: anticoagulation therapy and anemia:., .                 Abdominal:       Abdomen: soft. Vascular:                                      Anesthesia Plan      MAC     ASA 3     (Transfused one unit of PRBC's.)  Induction: intravenous. Anesthetic plan and risks discussed with patient. Use of blood products discussed with patient whom consented to blood products. Plan discussed with CRNA. Dayna Moses MD   1/18/2020    DOS STAFF ADDENDUM:    Pt seen and examined, chart reviewed (including anesthesia, drug and allergy history). Anesthetic plan, risks, benefits, alternatives, and personnel involved discussed with patient. Patient verbalized an understanding and agrees to proceed. Plan discussed with care team members and agreed upon.     Dylon Delgado MD  Staff Anesthesiologist  8:20 AM

## 2020-01-18 NOTE — CONSULTS
tablet Take 5 mg by mouth nightly   Yes Historical Provider, MD   Multiple Vitamin (MULTI-VITAMIN DAILY PO) Take 1 tablet by mouth daily    Yes Historical Provider, MD   Coenzyme Q10 (COQ-10) 100 MG CAPS Take 100 mg by mouth nightly    Yes Historical Provider, MD   metformin (GLUCOPHAGE) 500 MG tablet Take 500 mg by mouth 2 times daily (with meals). Yes Historical Provider, MD   omeprazole (PRILOSEC) 20 MG capsule Take 20 mg by mouth daily. Yes Historical Provider, MD   azaTHIOprine (IMURAN) 50 MG tablet Take 50 mg by mouth daily. Yes Historical Provider, MD   clopidogrel (PLAVIX) 75 MG tablet Take 75 mg by mouth daily.      Yes Historical Provider, MD   atorvastatin (LIPITOR) 10 MG tablet Take 10 mg by mouth nightly    Yes Historical Provider, MD       ALLERGIES:  Codeine    SOCIAL Hx:  Social History     Socioeconomic History    Marital status:      Spouse name: Not on file    Number of children: Not on file    Years of education: Not on file    Highest education level: Not on file   Occupational History    Not on file   Social Needs    Financial resource strain: Not on file    Food insecurity:     Worry: Not on file     Inability: Not on file    Transportation needs:     Medical: Not on file     Non-medical: Not on file   Tobacco Use    Smoking status: Former Smoker     Packs/day: 1.00   Substance and Sexual Activity    Alcohol use: Yes     Comment: occasional    Drug use: Not on file    Sexual activity: Not on file   Lifestyle    Physical activity:     Days per week: Not on file     Minutes per session: Not on file    Stress: Not on file   Relationships    Social connections:     Talks on phone: Not on file     Gets together: Not on file     Attends [employer] service: Not on file     Active member of club or organization: Not on file     Attends meetings of clubs or organizations: Not on file     Relationship status: Not on file    Intimate partner violence:     Fear of current

## 2020-01-19 ENCOUNTER — ANESTHESIA (OUTPATIENT)
Dept: ENDOSCOPY | Age: 70
DRG: 812 | End: 2020-01-19
Payer: COMMERCIAL

## 2020-01-19 VITALS
SYSTOLIC BLOOD PRESSURE: 118 MMHG | DIASTOLIC BLOOD PRESSURE: 62 MMHG | RESPIRATION RATE: 18 BRPM | OXYGEN SATURATION: 98 %

## 2020-01-19 VITALS
BODY MASS INDEX: 28.16 KG/M2 | OXYGEN SATURATION: 92 % | HEIGHT: 68 IN | RESPIRATION RATE: 17 BRPM | HEART RATE: 77 BPM | DIASTOLIC BLOOD PRESSURE: 68 MMHG | TEMPERATURE: 98.2 F | SYSTOLIC BLOOD PRESSURE: 148 MMHG | WEIGHT: 185.8 LBS

## 2020-01-19 LAB
EKG ATRIAL RATE: 85 BPM
EKG P AXIS: 66 DEGREES
EKG P-R INTERVAL: 162 MS
EKG Q-T INTERVAL: 372 MS
EKG QRS DURATION: 76 MS
EKG QTC CALCULATION (BAZETT): 442 MS
EKG R AXIS: 66 DEGREES
EKG T AXIS: 73 DEGREES
EKG VENTRICULAR RATE: 85 BPM
HCT VFR BLD CALC: 28.1 % (ref 37–54)
HEMOGLOBIN: 8.1 G/DL (ref 12.5–16.5)
INR BLD: 1.1
METER GLUCOSE: 151 MG/DL (ref 74–99)
METER GLUCOSE: 96 MG/DL (ref 74–99)
PROTHROMBIN TIME: 12.3 SEC (ref 9.3–12.4)

## 2020-01-19 PROCEDURE — 6370000000 HC RX 637 (ALT 250 FOR IP): Performed by: INTERNAL MEDICINE

## 2020-01-19 PROCEDURE — 36415 COLL VENOUS BLD VENIPUNCTURE: CPT

## 2020-01-19 PROCEDURE — 82962 GLUCOSE BLOOD TEST: CPT

## 2020-01-19 PROCEDURE — 6360000002 HC RX W HCPCS: Performed by: NURSE ANESTHETIST, CERTIFIED REGISTERED

## 2020-01-19 PROCEDURE — 85014 HEMATOCRIT: CPT

## 2020-01-19 PROCEDURE — 3700000000 HC ANESTHESIA ATTENDED CARE: Performed by: INTERNAL MEDICINE

## 2020-01-19 PROCEDURE — 3700000001 HC ADD 15 MINUTES (ANESTHESIA): Performed by: INTERNAL MEDICINE

## 2020-01-19 PROCEDURE — 2580000003 HC RX 258: Performed by: NURSE ANESTHETIST, CERTIFIED REGISTERED

## 2020-01-19 PROCEDURE — 3609012400 HC EGD TRANSORAL BIOPSY SINGLE/MULTIPLE: Performed by: INTERNAL MEDICINE

## 2020-01-19 PROCEDURE — 6370000000 HC RX 637 (ALT 250 FOR IP): Performed by: SURGERY

## 2020-01-19 PROCEDURE — C9113 INJ PANTOPRAZOLE SODIUM, VIA: HCPCS | Performed by: INTERNAL MEDICINE

## 2020-01-19 PROCEDURE — 0DB68ZX EXCISION OF STOMACH, VIA NATURAL OR ARTIFICIAL OPENING ENDOSCOPIC, DIAGNOSTIC: ICD-10-PCS | Performed by: INTERNAL MEDICINE

## 2020-01-19 PROCEDURE — 2580000003 HC RX 258: Performed by: SURGERY

## 2020-01-19 PROCEDURE — 2709999900 HC NON-CHARGEABLE SUPPLY: Performed by: INTERNAL MEDICINE

## 2020-01-19 PROCEDURE — 6360000002 HC RX W HCPCS: Performed by: INTERNAL MEDICINE

## 2020-01-19 PROCEDURE — 93010 ELECTROCARDIOGRAM REPORT: CPT | Performed by: INTERNAL MEDICINE

## 2020-01-19 PROCEDURE — 7100000010 HC PHASE II RECOVERY - FIRST 15 MIN: Performed by: INTERNAL MEDICINE

## 2020-01-19 PROCEDURE — 88305 TISSUE EXAM BY PATHOLOGIST: CPT

## 2020-01-19 PROCEDURE — 0DB98ZX EXCISION OF DUODENUM, VIA NATURAL OR ARTIFICIAL OPENING ENDOSCOPIC, DIAGNOSTIC: ICD-10-PCS | Performed by: INTERNAL MEDICINE

## 2020-01-19 PROCEDURE — 85610 PROTHROMBIN TIME: CPT

## 2020-01-19 PROCEDURE — 85018 HEMOGLOBIN: CPT

## 2020-01-19 PROCEDURE — 7100000011 HC PHASE II RECOVERY - ADDTL 15 MIN: Performed by: INTERNAL MEDICINE

## 2020-01-19 PROCEDURE — 88342 IMHCHEM/IMCYTCHM 1ST ANTB: CPT

## 2020-01-19 PROCEDURE — 2580000003 HC RX 258: Performed by: INTERNAL MEDICINE

## 2020-01-19 RX ORDER — CLOPIDOGREL BISULFATE 75 MG/1
75 TABLET ORAL DAILY
Status: DISCONTINUED | OUTPATIENT
Start: 2020-01-19 | End: 2020-01-19 | Stop reason: HOSPADM

## 2020-01-19 RX ORDER — SODIUM CHLORIDE 9 MG/ML
INJECTION, SOLUTION INTRAVENOUS CONTINUOUS PRN
Status: DISCONTINUED | OUTPATIENT
Start: 2020-01-19 | End: 2020-01-19 | Stop reason: SDUPTHER

## 2020-01-19 RX ORDER — PROPOFOL 10 MG/ML
INJECTION, EMULSION INTRAVENOUS PRN
Status: DISCONTINUED | OUTPATIENT
Start: 2020-01-19 | End: 2020-01-19 | Stop reason: SDUPTHER

## 2020-01-19 RX ORDER — ASPIRIN 81 MG/1
81 TABLET, CHEWABLE ORAL DAILY
Status: DISCONTINUED | OUTPATIENT
Start: 2020-01-19 | End: 2020-01-19 | Stop reason: HOSPADM

## 2020-01-19 RX ORDER — FERROUS SULFATE 325(65) MG
325 TABLET ORAL 2 TIMES DAILY WITH MEALS
Qty: 60 TABLET | Refills: 1 | Status: SHIPPED | OUTPATIENT
Start: 2020-01-19 | End: 2021-04-30

## 2020-01-19 RX ADMIN — AMLODIPINE BESYLATE 10 MG: 10 TABLET ORAL at 10:25

## 2020-01-19 RX ADMIN — METOPROLOL SUCCINATE 100 MG: 100 TABLET, EXTENDED RELEASE ORAL at 10:25

## 2020-01-19 RX ADMIN — ASPIRIN 81 MG 81 MG: 81 TABLET ORAL at 10:25

## 2020-01-19 RX ADMIN — PROPOFOL 30 MG: 10 INJECTION, EMULSION INTRAVENOUS at 08:34

## 2020-01-19 RX ADMIN — GLIPIZIDE 10 MG: 5 TABLET ORAL at 10:25

## 2020-01-19 RX ADMIN — SODIUM CHLORIDE 8 MG/HR: 9 INJECTION, SOLUTION INTRAVENOUS at 03:13

## 2020-01-19 RX ADMIN — PROPOFOL 30 MG: 10 INJECTION, EMULSION INTRAVENOUS at 08:28

## 2020-01-19 RX ADMIN — SODIUM CHLORIDE: 9 INJECTION, SOLUTION INTRAVENOUS at 08:24

## 2020-01-19 RX ADMIN — ENALAPRIL MALEATE 5 MG: 5 TABLET ORAL at 10:25

## 2020-01-19 RX ADMIN — AZATHIOPRINE 50 MG: 50 TABLET ORAL at 10:26

## 2020-01-19 RX ADMIN — PROPOFOL 40 MG: 10 INJECTION, EMULSION INTRAVENOUS at 08:30

## 2020-01-19 RX ADMIN — CLOPIDOGREL BISULFATE 75 MG: 75 TABLET ORAL at 10:25

## 2020-01-19 RX ADMIN — PROPOFOL 30 MG: 10 INJECTION, EMULSION INTRAVENOUS at 08:31

## 2020-01-19 RX ADMIN — PROPOFOL 30 MG: 10 INJECTION, EMULSION INTRAVENOUS at 08:33

## 2020-01-19 RX ADMIN — PROPOFOL 30 MG: 10 INJECTION, EMULSION INTRAVENOUS at 08:32

## 2020-01-19 RX ADMIN — SODIUM CHLORIDE, PRESERVATIVE FREE 10 ML: 5 INJECTION INTRAVENOUS at 10:25

## 2020-01-19 RX ADMIN — PROPOFOL 40 MG: 10 INJECTION, EMULSION INTRAVENOUS at 08:29

## 2020-01-19 ASSESSMENT — PAIN SCALES - GENERAL
PAINLEVEL_OUTOF10: 0
PAINLEVEL_OUTOF10: 0

## 2020-01-19 NOTE — DISCHARGE SUMMARY
[facility]  Internal Medicine  -Discharge note-    PCP:  Jared Cuellar DO  Admitting Physician:  Nikhil Huston DO  Consultants:  GI  Chief Complaint:    Chief Complaint   Patient presents with    Other     low hgb from lab draw this am-per pt 7.7       History of Present Illness  Elisabeth Kilpatrick is a 81 y.o. male who presents to [facility] ER complaining of anemia. Elisabeth Kilpatrick has a past medical history that includes coronary artery disease, hypertension, hyperlipidemia, non-insulin-dependent diabetes mellitus. Cathy Gurjitbrayanor presents the ER with anemia. He states he went to see his PCP for progressive fatigue and shortness of breath with exertion and had labs drawn. He says this has been progressively worsening over the past year. He was told to go to ER due to his hemoglobin being 7.2. He does have chronic back pain and states he takes Aleve every night for the past 2 years. He has never had a colonoscopy or EGD. He does not complain of any abdominal pain, hematochezia, melena, or hematuria. He is on aspirin and Plavix secondary to coronary artery disease s/p stenting. Admits to occasional alcohol use. He states that he stopped using tobacco.  Labs today showed hemoglobin of 7.0 with MCV of 73.5. Guaiac negative in ER. ER Course  Upon presentation to the ER, routine labwork was performed which revealed hemoglobin of 7.0, MCV 73.5. Imaging results are as outlined below in the Imaging section of this note. EKG revealed normal sinus rhythm. Upon arrival to the ER, patient was 45/76. The patient received 1unit PRBC in the emergency room and was admitted to telemetry under the care of Dr. Mika Perez. 1/18/2020  Patient seen and examined on telemetry floor. Patient's wife is at the bedside and case discussed. Patient feels better today and denies any chest pain, abdominal pain, nausea vomiting or unusual shortness of breath.   Patient does states he does get winded when he walks more than 10 feet. Temperature 98.7 with a heart rate 84. Blood pressure 137/66 but was as high as 177/76 yesterday afternoon. O2 sats 94% on room air. Hemoglobin still 7.9 with a serum iron of 32.    1/19/2020  Seen examined on telemetry floor. Patient feels good and denies any chest pain, abdominal pain, nausea vomiting or shortness of breath. Patient denies any melena or hematochezia. Patient EGD today and essentially unremarkable. Sugars range . Hemoglobin is 8.1 which seems to be stable after patient was admitted with a unit of packed cells given. Serum iron and was 32. Patient is stable for discharge. Last Hospital Admission -   None in EMR    Last Echocardiogram -  None in EMR     ED TRIAGE VITALS  BP: (!) 148/68, Temp: 98.2 °F (36.8 °C), Pulse: 77, Resp: 17, SpO2: 92 %    Vitals:    01/19/20 0921 01/19/20 0922 01/19/20 0923 01/19/20 0924   BP:  (!) 148/68     Pulse: 78 78 78 77   Resp: 14 15 13 17   Temp:       TempSrc:       SpO2: 93% 92% 92% 92%   Weight:       Height:             Histories  Past Medical History:   Diagnosis Date    Diabetes mellitus (HCC)     Hepatitis C antibody test positive     Hyperlipidemia     Hypertension     MI, old      Past Surgical History:   Procedure Laterality Date    CORONARY ANGIOPLASTY WITH STENT PLACEMENT       History reviewed. No pertinent family history. Home Medications  Prior to Admission medications    Medication Sig Start Date End Date Taking?  Authorizing Provider   aspirin 81 MG chewable tablet Take 81 mg by mouth daily   Yes Historical Provider, MD   glipiZIDE (GLUCOTROL) 10 MG tablet Take 10 mg by mouth daily    Yes Historical Provider, MD   amLODIPine (NORVASC) 10 MG tablet Take 10 mg by mouth daily   Yes Historical Provider, MD   enalapril (VASOTEC) 5 MG tablet Take 5 mg by mouth daily   Yes Historical Provider, MD   metoprolol succinate (TOPROL XL) 100 MG extended release tablet Take 100 mg by mouth daily   Yes Historical Provider, violence:     Fear of current or ex partner: Not on file     Emotionally abused: Not on file     Physically abused: Not on file     Forced sexual activity: Not on file   Other Topics Concern    Not on file   Social History Narrative    Not on file       Review of Systems  All bolded are positive; please see HPI  General:  Fever, chills, diaphoresis, fatigue, malaise, night sweats, weight loss  Psychological:  Anxiety, disorientation, hallucinations. ENT:  Epistaxis, headaches, vertigo, visual changes. Cardiovascular:  Chest pain, irregular heartbeats, palpitations, paroxysmal nocturnal dyspnea. Respiratory:  Shortness of breath, coughing, sputum production, hemoptysis, wheezing, orthopnea.   Gastrointestinal:  Nausea, vomiting, diarrhea, heartburn, constipation, abdominal pain, hematemesis, hematochezia, melena, acholic stools  Genito-Urinary:  Dysuria, urgency, frequency, hematuria  Musculoskeletal:  Joint pain, joint stiffness, joint swelling, muscle pain  Neurology:  Headache, focal neurological deficits, weakness, numbness, paresthesia  Derm:  Rashes, ulcers, excoriations, bruising  Extremities:  Decreased ROM, peripheral edema, mottling    Physical Examination  Vitals:  BP (!) 148/68   Pulse 77   Temp 98.2 °F (36.8 °C)   Resp 17   Ht 5' 8\" (1.727 m)   Wt 185 lb 12.8 oz (84.3 kg)   SpO2 92%   BMI 28.25 kg/m²   General Appearance:  awake, alert, and oriented to person, place, time, and purpose; appears stated age and cooperative; no apparent distress no labored breathing  HEENT:  NCAT; PERRL; conjunctiva pink, sclera clear  Neck:  no adenopathy, bruit, JVD, tenderness, masses, or nodules; supple, symmetrical, trachea midline, thyroid not enlarged  Lung:  clear to auscultation bilaterally; no use of accessory muscles; no rhonchi, rales, or crackles  Heart:  regular rate and regular rhythm without murmur, rub, or gallop  Abdomen:  soft, nontender, nondistended; normoactive bowel sounds; no source, with frequent Aleve use  · Coronary artery disease, on aspirin and plavix  · Hypertension  · Hyperlipidemia  · Non insulin dependent diabetes mellitus    -EGD eight 1/19 was essentially normal without significant inflammation or ulceration. Plan:  Discharge home today  Continue same home medication  Prescription for ferrous sulfate 325 1 twice daily for 1 month    Patient to follow-up primary care physician in 1-2 weeks  Patient given a lab requisition for a CBC in 10 days  .     Nikhil Huston DO  2:19 PM  1/19/2020

## 2020-01-19 NOTE — INTERVAL H&P NOTE
H&P Update    Patient's H&P was reviewed from admission date    /80   Pulse 74   Temp 98.1 °F (36.7 °C) (Oral)   Resp 18   Ht 5' 8\" (1.727 m)   Wt 185 lb 12.8 oz (84.3 kg)   SpO2 93%   BMI 28.25 kg/m²        Patient examined.     Plan:   EGD    Keli Langley DO  1/19/2020  7:27 AM

## 2020-01-19 NOTE — ANESTHESIA POSTPROCEDURE EVALUATION
Department of Anesthesiology  Postprocedure Note    Patient: Desiree Moura  MRN: [id number]  YOB: 1950  Date of evaluation: 1/19/2020  Time:  9:33 AM     Procedure Summary     Date:  01/19/20 Room / Location:  [address] [place] [address] / [facility]    Anesthesia Start:  0328 Anesthesia Stop:  4961    Procedure:  EGD BIOPSY (N/A ) Diagnosis:  (ANEMIA)    Surgeon:  Courtney Sanford DO Responsible Provider:  Shirin Obregon MD    Anesthesia Type:  MAC ASA Status:  3          Anesthesia Type: MAC    Kathy Phase I: Kathy Score: 10    Kathy Phase II:      Last vitals: Reviewed and per EMR flowsheets.        Anesthesia Post Evaluation    Patient location during evaluation: PACU  Patient participation: complete - patient participated  Level of consciousness: awake and alert  Airway patency: patent  Nausea & Vomiting: no vomiting and no nausea  Cardiovascular status: hemodynamically stable  Respiratory status: acceptable  Hydration status: stable

## 2021-04-16 ENCOUNTER — HOSPITAL ENCOUNTER (OUTPATIENT)
Dept: INFUSION THERAPY | Age: 71
Discharge: HOME | End: 2021-04-16
Payer: MEDICARE

## 2021-04-16 ENCOUNTER — OFFICE VISIT (OUTPATIENT)
Dept: ONCOLOGY | Age: 71
End: 2021-04-16
Payer: MEDICARE

## 2021-04-16 VITALS
HEIGHT: 69 IN | WEIGHT: 194 LBS | OXYGEN SATURATION: 96 % | TEMPERATURE: 97.5 F | BODY MASS INDEX: 28.73 KG/M2 | DIASTOLIC BLOOD PRESSURE: 61 MMHG | HEART RATE: 105 BPM | SYSTOLIC BLOOD PRESSURE: 141 MMHG | RESPIRATION RATE: 18 BRPM

## 2021-04-16 DIAGNOSIS — D64.9 NORMOCYTIC ANEMIA: Primary | ICD-10-CM

## 2021-04-16 DIAGNOSIS — D64.9 NORMOCYTIC ANEMIA: ICD-10-CM

## 2021-04-16 LAB
ALBUMIN SERPL-MCNC: 3.6 G/DL (ref 3.5–5.2)
ALP BLD-CCNC: 203 U/L (ref 40–129)
ALT SERPL-CCNC: 22 U/L (ref 0–40)
ANION GAP SERPL CALCULATED.3IONS-SCNC: 19 MMOL/L (ref 7–16)
AST SERPL-CCNC: 53 U/L (ref 0–39)
BASOPHILS ABSOLUTE: 0.1 E9/L (ref 0–0.2)
BASOPHILS RELATIVE PERCENT: 1.4 % (ref 0–2)
BILIRUB SERPL-MCNC: 0.6 MG/DL (ref 0–1.2)
BUN BLDV-MCNC: 18 MG/DL (ref 8–23)
CALCIUM SERPL-MCNC: 9.1 MG/DL (ref 8.6–10.2)
CHLORIDE BLD-SCNC: 102 MMOL/L (ref 98–107)
CO2: 19 MMOL/L (ref 22–29)
CREAT SERPL-MCNC: 1 MG/DL (ref 0.7–1.2)
DAT POLYSPECIFIC: NORMAL
EOSINOPHILS ABSOLUTE: 0.18 E9/L (ref 0.05–0.5)
EOSINOPHILS RELATIVE PERCENT: 2.5 % (ref 0–6)
FERRITIN: 46 NG/ML
FOLATE: 3.4 NG/ML (ref 4.8–24.2)
GFR AFRICAN AMERICAN: >60
GFR NON-AFRICAN AMERICAN: >60 ML/MIN/1.73
GLUCOSE BLD-MCNC: 248 MG/DL (ref 74–99)
HAPTOGLOBIN: 159 MG/DL (ref 30–200)
HCT VFR BLD CALC: 36.4 % (ref 37–54)
HEMOGLOBIN: 11.3 G/DL (ref 12.5–16.5)
IMMATURE GRANULOCYTES #: 0.05 E9/L
IMMATURE GRANULOCYTES %: 0.7 % (ref 0–5)
IMMATURE RETIC FRACT: 27.9 % (ref 2.3–13.4)
IRON SATURATION: 89 % (ref 20–55)
IRON: 287 MCG/DL (ref 59–158)
LACTATE DEHYDROGENASE: 199 U/L (ref 135–225)
LYMPHOCYTES ABSOLUTE: 0.98 E9/L (ref 1.5–4)
LYMPHOCYTES RELATIVE PERCENT: 13.5 % (ref 20–42)
MCH RBC QN AUTO: 32.1 PG (ref 26–35)
MCHC RBC AUTO-ENTMCNC: 31 % (ref 32–34.5)
MCV RBC AUTO: 103.4 FL (ref 80–99.9)
MONOCYTES ABSOLUTE: 0.82 E9/L (ref 0.1–0.95)
MONOCYTES RELATIVE PERCENT: 11.3 % (ref 2–12)
NEUTROPHILS ABSOLUTE: 5.14 E9/L (ref 1.8–7.3)
NEUTROPHILS RELATIVE PERCENT: 70.6 % (ref 43–80)
PDW BLD-RTO: 19.2 FL (ref 11.5–15)
PLATELET # BLD: 230 E9/L (ref 130–450)
PMV BLD AUTO: 10.4 FL (ref 7–12)
POTASSIUM SERPL-SCNC: 5 MMOL/L (ref 3.5–5)
RBC # BLD: 3.52 E12/L (ref 3.8–5.8)
RETIC HGB EQUIVALENT: 36.1 PG (ref 28.2–36.6)
RETICULOCYTE ABSOLUTE COUNT: 0.09 E12/L
RETICULOCYTE COUNT PCT: 2.6 % (ref 0.4–1.9)
SODIUM BLD-SCNC: 140 MMOL/L (ref 132–146)
TOTAL IRON BINDING CAPACITY: 323 MCG/DL (ref 250–450)
TSH SERPL DL<=0.05 MIU/L-ACNC: 1.77 UIU/ML (ref 0.27–4.2)
VITAMIN B-12: 269 PG/ML (ref 211–946)
WBC # BLD: 7.3 E9/L (ref 4.5–11.5)

## 2021-04-16 PROCEDURE — 99205 OFFICE O/P NEW HI 60 MIN: CPT | Performed by: INTERNAL MEDICINE

## 2021-04-16 PROCEDURE — 81270 JAK2 GENE: CPT

## 2021-04-16 PROCEDURE — 82607 VITAMIN B-12: CPT

## 2021-04-16 PROCEDURE — 88237 TISSUE CULTURE BONE MARROW: CPT

## 2021-04-16 PROCEDURE — 85045 AUTOMATED RETICULOCYTE COUNT: CPT

## 2021-04-16 PROCEDURE — 84443 ASSAY THYROID STIM HORMONE: CPT

## 2021-04-16 PROCEDURE — 82728 ASSAY OF FERRITIN: CPT

## 2021-04-16 PROCEDURE — 83540 ASSAY OF IRON: CPT

## 2021-04-16 PROCEDURE — 84630 ASSAY OF ZINC: CPT

## 2021-04-16 PROCEDURE — 86880 COOMBS TEST DIRECT: CPT

## 2021-04-16 PROCEDURE — 88184 FLOWCYTOMETRY/ TC 1 MARKER: CPT

## 2021-04-16 PROCEDURE — 80053 COMPREHEN METABOLIC PANEL: CPT

## 2021-04-16 PROCEDURE — 82746 ASSAY OF FOLIC ACID SERUM: CPT

## 2021-04-16 PROCEDURE — 83010 ASSAY OF HAPTOGLOBIN QUANT: CPT

## 2021-04-16 PROCEDURE — 36415 COLL VENOUS BLD VENIPUNCTURE: CPT

## 2021-04-16 PROCEDURE — 99214 OFFICE O/P EST MOD 30 MIN: CPT

## 2021-04-16 PROCEDURE — 86334 IMMUNOFIX E-PHORESIS SERUM: CPT

## 2021-04-16 PROCEDURE — 85025 COMPLETE CBC W/AUTO DIFF WBC: CPT

## 2021-04-16 PROCEDURE — 88275 CYTOGENETICS 100-300: CPT

## 2021-04-16 PROCEDURE — 88271 CYTOGENETICS DNA PROBE: CPT

## 2021-04-16 PROCEDURE — 83615 LACTATE (LD) (LDH) ENZYME: CPT

## 2021-04-16 PROCEDURE — 84207 ASSAY OF VITAMIN B-6: CPT

## 2021-04-16 PROCEDURE — 83550 IRON BINDING TEST: CPT

## 2021-04-16 PROCEDURE — 84165 PROTEIN E-PHORESIS SERUM: CPT

## 2021-04-16 PROCEDURE — 88185 FLOWCYTOMETRY/TC ADD-ON: CPT

## 2021-04-16 PROCEDURE — 82668 ASSAY OF ERYTHROPOIETIN: CPT

## 2021-04-16 NOTE — PROGRESS NOTES
Rfl:     clopidogrel (PLAVIX) 75 MG tablet, Take 75 mg by mouth daily. , Disp: , Rfl:     atorvastatin (LIPITOR) 10 MG tablet, Take 10 mg by mouth nightly , Disp: , Rfl:        Past Medical History:   Diagnosis Date    Diabetes mellitus ([facility] [address]     Hepatitis C antibody test positive     Hyperlipidemia     Hypertension     MI, old        Past Surgical History:   Procedure Laterality Date    CORONARY ANGIOPLASTY WITH STENT PLACEMENT      UPPER GASTROINTESTINAL ENDOSCOPY N/A 1/19/2020    EGD BIOPSY performed by Billy Rowell DO at [address] [facility] reviewed. No pertinent family history.     Social History     Socioeconomic History    Marital status:      Spouse name: Not on file    Number of children: Not on file    Years of education: Not on file    Highest education level: Not on file   Occupational History    Not on file   Social Needs    Financial resource strain: Not on file    Food insecurity     Worry: Not on file     Inability: Not on file    Transportation needs     Medical: Not on file     Non-medical: Not on file   Tobacco Use    Smoking status: Former Smoker     Packs/day: 1.00   Substance and Sexual Activity    Alcohol use: Yes     Comment: occasional    Drug use: Not on file    Sexual activity: Not on file   Lifestyle    Physical activity     Days per week: Not on file     Minutes per session: Not on file    Stress: Not on file   Relationships    Social connections     Talks on phone: Not on file     Gets together: Not on file     Attends [employer] service: Not on file     Active member of club or organization: Not on file     Attends meetings of clubs or organizations: Not on file     Relationship status: Not on file    Intimate partner violence     Fear of current or ex partner: Not on file     Emotionally abused: Not on file     Physically abused: Not on file     Forced sexual activity: Not on file   Other Topics Concern    Not on file   Social History care activities  5. Educate patient/family/caregiver on falls prevention  6. Falls risk precaution (Yellow sticker Level II) placed on patient chart   7 or   Higher High Risk 1. Place patient in easily observable treatment room  2. Patient attended at all times by family member or staff  3. Provide assistance as indicated for ambulation activities  4. Reorient confused/cognitively impaired patient  5. Call-light/bell within patient's reach  6. Chair/bed in low position, stretcher/bed with siderails up except when performing patient care activities  7. Educate patient/family/caregiver on falls prevention  8. Falls risk precaution (Yellow sticker Level III) placed on patient chart           MALNUTRITION RISK SCREENING ASSESSMENT    Instructions:  Assess the patient and enter the appropriate indicators that are present for nutrition risk identification. Total the numbers entered and assign a risk score. Follow the appropriate action for total score listed below. Assessment   Date  4/16/2021     1. Have you lost weight without trying? 0- No     2. Have you been eating poorly because of a decreased appetite? 0- No   3. Do you have a diagnosis of head and neck cancer?       0- No                                                                                    TOTAL 0        Score of 0-1: No action  Score 2 or greater:  · For Non-Diabetic Patient: Recommend adding Ensure Enlive 2 x daily and provide patient with Ensure wellness bag with coupons  · For Diabetic Patient: Recommend adding Glucerna Shake 2 x daily and provide patient with Glucerna Wellness bag with coupons  · Route to the dietitian via Paresh Naidu RN

## 2021-04-16 NOTE — PROGRESS NOTES
Department of [facility] Med Oncology  Attending Consult Note    Reason for Visit: Consultation on a patient with Normocytic Anemia    Referring Physician: Claudia Patel DO    PCP:  Eloisa Hannon DO    History of Present Illness:  72 y/o male with hx of HTN, Hyperlipidemia, DMII, CAD, who was in the ED on 01/17/2021 and found to have microcytic anemia. Hb 7.0  Hct 24.7   MCV 73.5     WBC 9.0  Guaiac negative in ED; Evaluated by GI team while inpatient  EGD by Dr. Carie Gibson on 01/19/2020: Normal upper endoscopy. A. Duodenum, biopsy:   Multiple fragments of duodenal mucosa with no significant pathologic findings. Architecture is intact with appropriate villous to crypt length ratios. Negative for increased intraepithelial lymphocytes. B. Stomach, antrum, biopsy:   Mild reactive gastropathy changes with superimposed minimal chronic gastritis. Immunostain for Helicobacter pylori organisms is negative. Negative for intestinal metaplasia. Anemia, possible GI source. Restarted ASA and Plavix   D/C on 01/19/2020 on FeSO4 325 mg po daily. Outpatient colonoscopy, capsule endoscopy done unremarkable per patient (records requested)    On 03/25/2021: Hb 11.1  Hct 34.1   MCV 97     WBC 9.3  Fe 107    Referred to our clinic for further evaluation. No fever, chills. Fair appetite and energy level. No bleeding noted recently. Review of Systems;  CONSTITUTIONAL: No fever, chills. Fair appetite and energy level. ENMT: Eyes: No diplopia; Nose: No epistaxis. Mouth: No sore throat. RESPIRATORY: No hemoptysis. Shortness of breath on exertion  CARDIOVASCULAR: No chest pain, palpitations. GASTROINTESTINAL: No nausea/vomiting, abdominal pain, diarrhea/constipation. No melena or hematochezia  GENITOURINARY: No dysuria, urinary frequency, hematuria. NEURO: No syncope, presyncope, headache.   Remainder:  ROS NEGATIVE    Past Medical History:      Diagnosis Date    Diabetes mellitus ([address]     Hepatitis C antibody test positive     Hyperlipidemia     Hypertension     MI, old      Past Surgical History:      Procedure Laterality Date    CORONARY ANGIOPLASTY WITH STENT PLACEMENT      UPPER GASTROINTESTINAL ENDOSCOPY N/A 1/19/2020    EGD BIOPSY performed by Shameka Welch DO at [address] History:  No family history on file. Medications:  Reviewed and reconciled. Social History:  Social History     Socioeconomic History    Marital status:      Spouse name: Not on file    Number of children: Not on file    Years of education: Not on file    Highest education level: Not on file   Occupational History    Not on file   Social Needs    Financial resource strain: Not on file    Food insecurity     Worry: Not on file     Inability: Not on file    Transportation needs     Medical: Not on file     Non-medical: Not on file   Tobacco Use    Smoking status: Former Smoker     Packs/day: 1.00   Substance and Sexual Activity    Alcohol use: Yes     Comment: occasional    Drug use: Not on file    Sexual activity: Not on file   Lifestyle    Physical activity     Days per week: Not on file     Minutes per session: Not on file    Stress: Not on file   Relationships    Social connections     Talks on phone: Not on file     Gets together: Not on file     Attends Jainism service: Not on file     Active member of club or organization: Not on file     Attends meetings of clubs or organizations: Not on file     Relationship status: Not on file    Intimate partner violence     Fear of current or ex partner: Not on file     Emotionally abused: Not on file     Physically abused: Not on file     Forced sexual activity: Not on file   Other Topics Concern    Not on file   Social History Narrative    Not on file     Allergies:   Allergies   Allergen Reactions    Codeine      Physical Exam:  BP (!) 141/61   Pulse 105   Temp 97.5 °F (36.4 °C)   Resp 18   Ht 5' 9\" (1.753 m)   Wt 194 lb (88 kg)   SpO2 96%   BMI 28.65 kg/m²   GENERAL: Alert, oriented x 3, not in acute distress. HEENT: PERRLA; EOMI. Oropharynx clear. NECK: Supple. Without lymphadenopathy. LUNGS: Good air entry bilaterally. No wheezing, crackles or ronchi. CARDIOVASCULAR: Regular rate. No murmurs, rubs or gallops. ABDOMEN: Soft. Non-tender, non-distended. Positive bowel sounds. EXTREMITIES: Without clubbing, cyanosis, or edema. NEUROLOGIC: No focal deficits. Impression/Plan:  72 y/o male with hx of HTN, Hyperlipidemia, DMII, CAD, who was in the ED on 01/17/2021 and found to have microcytic anemia. Hb 7.0  Hct 24.7   MCV 73.5     WBC 9.0  Guaiac negative in ED; Evaluated by GI team while inpatient  EGD by Dr. Gulshan Awan on 01/19/2020: Normal upper endoscopy. A. Duodenum, biopsy:   Multiple fragments of duodenal mucosa with no significant pathologic findings. Architecture is intact with appropriate villous to crypt length ratios. Negative for increased intraepithelial lymphocytes. B. Stomach, antrum, biopsy:   Mild reactive gastropathy changes with superimposed minimal chronic gastritis. Immunostain for Helicobacter pylori organisms is negative. Negative for intestinal metaplasia. Anemia, possible GI source. Restarted ASA and Plavix   D/C on 01/19/2020 on FeSO4 325 mg po daily. Outpatient colonoscopy, capsule endoscopy done unremarkable per patient (records requested)    On 03/25/2021: Hb 11.1  Hct 34.1   MCV 97     WBC 9.3  Fe 107    Referred to our clinic for further evaluation. No fever, chills. Fair appetite and energy level. No bleeding noted recently. Extensive blood work drawn today 04/16/2021 to evaluate his anemia    RTC 2 weeks to review test results.  Will also review outside GI records (colonoscopy, capsule endoscopy)    Thank you for allowing us to participate in the care of Mr. Audrey Hubbard MD   4/16/2021

## 2021-04-18 LAB — ERYTHROPOIETIN: 53 MU/ML (ref 4–27)

## 2021-04-19 LAB
ALBUMIN SERPL-MCNC: 3.1 G/DL (ref 3.5–4.7)
ALPHA-1-GLOBULIN: 0.3 G/DL (ref 0.2–0.4)
ALPHA-2-GLOBULIN: 1 G/FL (ref 0.5–1)
BETA GLOBULIN: 1.3 G/DL (ref 0.8–1.3)
ELECTROPHORESIS: ABNORMAL
GAMMA GLOBULIN: 2.1 G/DL (ref 0.7–1.6)
IMMUNOFIXATION RESULT, SERUM: NORMAL
PATHOLOGIST REVIEW: NORMAL
TOTAL PROTEIN: 7.8 G/DL (ref 6.4–8.3)
ZINC: 65.9 UG/DL (ref 60–120)

## 2021-04-20 LAB
Lab: NORMAL
REPORT: NORMAL
THIS TEST SENT TO: NORMAL

## 2021-04-21 LAB — VITAMIN B6: 8.6 NMOL/L (ref 20–125)

## 2021-04-23 LAB — JAK2 GENE MUTATION QUAL: NOT DETECTED

## 2021-04-27 LAB
Lab: NORMAL
REPORT: NORMAL
THIS TEST SENT TO: NORMAL

## 2021-04-30 ENCOUNTER — OFFICE VISIT (OUTPATIENT)
Dept: ONCOLOGY | Age: 71
End: 2021-04-30
Payer: MEDICARE

## 2021-04-30 ENCOUNTER — HOSPITAL ENCOUNTER (OUTPATIENT)
Dept: INFUSION THERAPY | Age: 71
Discharge: HOME | End: 2021-04-30
Payer: MEDICARE

## 2021-04-30 VITALS
OXYGEN SATURATION: 96 % | HEIGHT: 69 IN | HEART RATE: 91 BPM | DIASTOLIC BLOOD PRESSURE: 60 MMHG | RESPIRATION RATE: 18 BRPM | TEMPERATURE: 97.5 F | BODY MASS INDEX: 28.9 KG/M2 | SYSTOLIC BLOOD PRESSURE: 147 MMHG | WEIGHT: 195.1 LBS

## 2021-04-30 DIAGNOSIS — D53.9 MACROCYTIC ANEMIA: ICD-10-CM

## 2021-04-30 DIAGNOSIS — D53.9 MACROCYTIC ANEMIA: Primary | ICD-10-CM

## 2021-04-30 LAB — HOMOCYSTEINE: 41.1 UMOL/L (ref 0–15)

## 2021-04-30 PROCEDURE — 82784 ASSAY IGA/IGD/IGG/IGM EACH: CPT

## 2021-04-30 PROCEDURE — 36415 COLL VENOUS BLD VENIPUNCTURE: CPT

## 2021-04-30 PROCEDURE — 83921 ORGANIC ACID SINGLE QUANT: CPT

## 2021-04-30 PROCEDURE — 82232 ASSAY OF BETA-2 PROTEIN: CPT

## 2021-04-30 PROCEDURE — 99214 OFFICE O/P EST MOD 30 MIN: CPT | Performed by: INTERNAL MEDICINE

## 2021-04-30 PROCEDURE — 83090 ASSAY OF HOMOCYSTEINE: CPT

## 2021-04-30 PROCEDURE — 83883 ASSAY NEPHELOMETRY NOT SPEC: CPT

## 2021-04-30 PROCEDURE — 99212 OFFICE O/P EST SF 10 MIN: CPT

## 2021-04-30 RX ORDER — PYRIDOXINE HCL (VITAMIN B6) 50 MG
50 TABLET ORAL DAILY
Qty: 30 TABLET | Refills: 3 | Status: SHIPPED
Start: 2021-04-30 | End: 2021-06-23 | Stop reason: SDUPTHER

## 2021-04-30 RX ORDER — FOLIC ACID 1 MG/1
1 TABLET ORAL DAILY
Qty: 30 TABLET | Refills: 5 | Status: SHIPPED
Start: 2021-04-30 | End: 2021-09-17 | Stop reason: SDUPTHER

## 2021-04-30 NOTE — PROGRESS NOTES
[place] [id number]  Attending Clinic Note    Reason for Visit: Follow-up on a patient with Anemia    PCP:  Chester Estrada DO    History of Present Illness:  72 y/o male with hx of HTN, Hyperlipidemia, DMII, CAD, who was in the ED on 01/17/2021 and found to have microcytic anemia. Hb 7.0  Hct 24.7   MCV 73.5     WBC 9.0  Guaiac negative in ED; Evaluated by GI team while inpatient  EGD by Dr. Marcia Jackson on 01/19/2020: Normal upper endoscopy. A. Duodenum, biopsy:   Multiple fragments of duodenal mucosa with no significant pathologic findings. Architecture is intact with appropriate villous to crypt length ratios. Negative for increased intraepithelial lymphocytes. B. Stomach, antrum, biopsy:   Mild reactive gastropathy changes with superimposed minimal chronic gastritis. Immunostain for Helicobacter pylori organisms is negative. Negative for intestinal metaplasia. Anemia, possible GI source. Restarted ASA and Plavix   D/C on 01/19/2020 on FeSO4 325 mg po daily. Outpatient colonoscopy, capsule endoscopy done unremarkable per patient (records requested)    On 03/25/2021: Hb 11.1  Hct 34.1   MCV 97     WBC 9.3  Fe 107    Referred to our clinic for further evaluation. No fever, chills. Fair appetite and energy level. No bleeding noted recently. Review of Systems;  CONSTITUTIONAL: No fever, chills. Fair appetite and energy level. ENMT: Eyes: No diplopia; Nose: No epistaxis. Mouth: No sore throat. RESPIRATORY: No hemoptysis. Shortness of breath on exertion  CARDIOVASCULAR: No chest pain, palpitations. GASTROINTESTINAL: No nausea/vomiting, abdominal pain, diarrhea/constipation. No melena or hematochezia  GENITOURINARY: No dysuria, urinary frequency, hematuria. NEURO: No syncope, presyncope, headache.   Remainder:  ROS NEGATIVE    Past Medical History:      Diagnosis Date    Diabetes mellitus ([facility] [address]     Hepatitis C antibody test positive     Hyperlipidemia     Hypertension     MI, old      Medications:  Reviewed and reconciled. Allergies: Allergies   Allergen Reactions    Codeine      Physical Exam:  BP (!) 147/60   Pulse 91   Temp 97.5 °F (36.4 °C)   Resp 18   Ht 5' 9\" (1.753 m)   Wt 195 lb 1.6 oz (88.5 kg)   SpO2 96%   BMI 28.81 kg/m²   GENERAL: Alert, oriented x 3, not in acute distress. HEENT: PERRLA; EOMI. Oropharynx clear. NECK: Supple. Without lymphadenopathy. LUNGS: Good air entry bilaterally. No wheezing, crackles or ronchi. CARDIOVASCULAR: Regular rate. No murmurs, rubs or gallops. ABDOMEN: Soft. Non-tender, non-distended. Positive bowel sounds. EXTREMITIES: Without clubbing, cyanosis, or edema. NEUROLOGIC: No focal deficits. Impression/Plan:  74 y/o male with hx of HTN, Hyperlipidemia, DMII, CAD, who was in the ED on 01/17/2021 and found to have microcytic anemia. Hb 7.0  Hct 24.7   MCV 73.5     WBC 9.0  Guaiac negative in ED; Evaluated by GI team while inpatient  EGD by Dr. Marcia Jackson on 01/19/2020: Normal upper endoscopy. A. Duodenum, biopsy:   Multiple fragments of duodenal mucosa with no significant pathologic findings. Architecture is intact with appropriate villous to crypt length ratios. Negative for increased intraepithelial lymphocytes. B. Stomach, antrum, biopsy:   Mild reactive gastropathy changes with superimposed minimal chronic gastritis. Immunostain for Helicobacter pylori organisms is negative. Negative for intestinal metaplasia. Anemia, possible GI source. Restarted ASA and Plavix   D/C on 01/19/2020 on FeSO4 325 mg po daily. Outpatient colonoscopy, capsule endoscopy done unremarkable per patient (records requested)    On 03/25/2021: Hb 11.1  Hct 34.1   MCV 97     WBC 9.3  Fe 107    Referred to our clinic for further evaluation. No fever, chills. Fair appetite and energy level. No bleeding noted recently.      Extensive blood work 04/16/2021  Hb 11.3 Hct 34.3  .4  WBC 7.3

## 2021-05-03 LAB
IGA: 586 MG/DL (ref 70–400)
IGG: 1765 MG/DL (ref 700–1600)
IGM: 115 MG/DL (ref 40–230)
KAPPA FREE LIGHT CHAINS QNT: 92.06 MG/L (ref 3.3–19.4)
KAPPA/LAMBDA FREE LIGHT CHAIN RATIO: 1.36 (ref 0.26–1.65)
LAMBDA FREE LIGHT CHAINS QNT: 67.76 MG/L (ref 5.71–26.3)

## 2021-05-04 LAB — BETA-2 MICROGLOBULIN: 3.5 MG/L (ref 0.6–2.4)

## 2021-05-06 LAB — METHYLMALONIC ACID: 0.25 UMOL/L (ref 0–0.4)

## 2021-05-28 ENCOUNTER — HOSPITAL ENCOUNTER (OUTPATIENT)
Dept: INFUSION THERAPY | Age: 71
End: 2021-05-28
Payer: MEDICARE

## 2021-05-28 DIAGNOSIS — D53.9 MACROCYTIC ANEMIA: Primary | ICD-10-CM

## 2021-05-28 DIAGNOSIS — D64.9 NORMOCYTIC ANEMIA: ICD-10-CM

## 2021-06-04 ENCOUNTER — OFFICE VISIT (OUTPATIENT)
Dept: ONCOLOGY | Age: 71
End: 2021-06-04
Payer: MEDICARE

## 2021-06-04 ENCOUNTER — HOSPITAL ENCOUNTER (OUTPATIENT)
Dept: INFUSION THERAPY | Age: 71
Discharge: HOME | End: 2021-06-04
Payer: MEDICARE

## 2021-06-04 VITALS
DIASTOLIC BLOOD PRESSURE: 67 MMHG | HEART RATE: 85 BPM | HEIGHT: 69 IN | TEMPERATURE: 97.8 F | WEIGHT: 194 LBS | SYSTOLIC BLOOD PRESSURE: 132 MMHG | OXYGEN SATURATION: 93 % | BODY MASS INDEX: 28.73 KG/M2

## 2021-06-04 DIAGNOSIS — D53.9 MACROCYTIC ANEMIA: ICD-10-CM

## 2021-06-04 DIAGNOSIS — D64.9 NORMOCYTIC ANEMIA: ICD-10-CM

## 2021-06-04 DIAGNOSIS — D53.9 MACROCYTIC ANEMIA: Primary | ICD-10-CM

## 2021-06-04 LAB
ALBUMIN SERPL-MCNC: 3.5 G/DL (ref 3.5–5.2)
ALP BLD-CCNC: 171 U/L (ref 40–129)
ALT SERPL-CCNC: 34 U/L (ref 0–40)
ANION GAP SERPL CALCULATED.3IONS-SCNC: 15 MMOL/L (ref 7–16)
AST SERPL-CCNC: 60 U/L (ref 0–39)
BASOPHILS ABSOLUTE: 0.1 E9/L (ref 0–0.2)
BASOPHILS RELATIVE PERCENT: 1.3 % (ref 0–2)
BILIRUB SERPL-MCNC: 0.4 MG/DL (ref 0–1.2)
BUN BLDV-MCNC: 14 MG/DL (ref 6–23)
CALCIUM SERPL-MCNC: 9 MG/DL (ref 8.6–10.2)
CHLORIDE BLD-SCNC: 104 MMOL/L (ref 98–107)
CO2: 24 MMOL/L (ref 22–29)
CREAT SERPL-MCNC: 0.9 MG/DL (ref 0.7–1.2)
EOSINOPHILS ABSOLUTE: 0.54 E9/L (ref 0.05–0.5)
EOSINOPHILS RELATIVE PERCENT: 6.8 % (ref 0–6)
FERRITIN: 32 NG/ML
FOLATE: >20 NG/ML (ref 4.8–24.2)
GFR AFRICAN AMERICAN: >60
GFR NON-AFRICAN AMERICAN: >60 ML/MIN/1.73
GLUCOSE BLD-MCNC: 195 MG/DL (ref 74–99)
HCT VFR BLD CALC: 30.8 % (ref 37–54)
HEMOGLOBIN: 9.8 G/DL (ref 12.5–16.5)
IMMATURE GRANULOCYTES #: 0.03 E9/L
IMMATURE GRANULOCYTES %: 0.4 % (ref 0–5)
IRON SATURATION: 12 % (ref 20–55)
IRON: 39 MCG/DL (ref 59–158)
LYMPHOCYTES ABSOLUTE: 1.21 E9/L (ref 1.5–4)
LYMPHOCYTES RELATIVE PERCENT: 15.2 % (ref 20–42)
MCH RBC QN AUTO: 31.4 PG (ref 26–35)
MCHC RBC AUTO-ENTMCNC: 31.8 % (ref 32–34.5)
MCV RBC AUTO: 98.7 FL (ref 80–99.9)
MONOCYTES ABSOLUTE: 1.1 E9/L (ref 0.1–0.95)
MONOCYTES RELATIVE PERCENT: 13.8 % (ref 2–12)
NEUTROPHILS ABSOLUTE: 4.99 E9/L (ref 1.8–7.3)
NEUTROPHILS RELATIVE PERCENT: 62.5 % (ref 43–80)
PDW BLD-RTO: 16.9 FL (ref 11.5–15)
PLATELET # BLD: 271 E9/L (ref 130–450)
PMV BLD AUTO: 10.2 FL (ref 7–12)
POTASSIUM SERPL-SCNC: 5.2 MMOL/L (ref 3.5–5)
RBC # BLD: 3.12 E12/L (ref 3.8–5.8)
SODIUM BLD-SCNC: 143 MMOL/L (ref 132–146)
TOTAL IRON BINDING CAPACITY: 315 MCG/DL (ref 250–450)
TOTAL PROTEIN: 7.8 G/DL (ref 6.4–8.3)
VITAMIN B-12: 617 PG/ML (ref 211–946)
WBC # BLD: 8 E9/L (ref 4.5–11.5)

## 2021-06-04 PROCEDURE — 82728 ASSAY OF FERRITIN: CPT

## 2021-06-04 PROCEDURE — 85025 COMPLETE CBC W/AUTO DIFF WBC: CPT

## 2021-06-04 PROCEDURE — 82746 ASSAY OF FOLIC ACID SERUM: CPT

## 2021-06-04 PROCEDURE — 99212 OFFICE O/P EST SF 10 MIN: CPT

## 2021-06-04 PROCEDURE — 83540 ASSAY OF IRON: CPT

## 2021-06-04 PROCEDURE — 82607 VITAMIN B-12: CPT

## 2021-06-04 PROCEDURE — 99214 OFFICE O/P EST MOD 30 MIN: CPT | Performed by: INTERNAL MEDICINE

## 2021-06-04 PROCEDURE — 80053 COMPREHEN METABOLIC PANEL: CPT

## 2021-06-04 PROCEDURE — 36415 COLL VENOUS BLD VENIPUNCTURE: CPT

## 2021-06-04 PROCEDURE — 83550 IRON BINDING TEST: CPT

## 2021-06-04 NOTE — PROGRESS NOTES
[place] [id number]  Attending Clinic Note    Reason for Visit: Follow-up on a patient with Anemia    PCP:  Rex Jessica DO    History of Present Illness:  74 y/o male with hx of HTN, Hyperlipidemia, DMII, CAD, who was in the ED on 01/17/2021 and found to have microcytic anemia. Hb 7.0  Hct 24.7   MCV 73.5     WBC 9.0  Guaiac negative in ED; Evaluated by GI team while inpatient  EGD by Dr. Rick Washington on 01/19/2020: Normal upper endoscopy. A. Duodenum, biopsy:   Multiple fragments of duodenal mucosa with no significant pathologic findings. Architecture is intact with appropriate villous to crypt length ratios. Negative for increased intraepithelial lymphocytes. B. Stomach, antrum, biopsy:   Mild reactive gastropathy changes with superimposed minimal chronic gastritis. Immunostain for Helicobacter pylori organisms is negative. Negative for intestinal metaplasia. Anemia, possible GI source. Restarted ASA and Plavix   D/C on 01/19/2020 on FeSO4 325 mg po daily. Outpatient colonoscopy, capsule endoscopy done unremarkable per patient (records requested)    On 03/25/2021: Hb 11.1  Hct 34.1   MCV 97     WBC 9.3  Fe 107    Referred to our clinic for further evaluation. No fever, chills. Fair appetite and energy level. No bleeding noted recently. Extensive blood work 04/16/2021  Hb 11.3 Hct 34.3  .4  WBC 7.3      Fe 287 TIBC 323  FeSat 89% Ferritin 46; D/C FeSO4  Hemolytic work-up negative  Epo 55 (4-27)    Peripheral blood smear:  Platelets are adequate and unremarkable.    There is a macrocytic anemia with anisopoikilocytosis and slight polychromasia. Megaloblastic changes not seen.    Absolute neutrophils, monocytes, eosinophils, and basophils are present in normal numbers. Absolute lymphocytes are decreased.      BUN 18  Creat 1.0    AlcP 203  AST 53; F/U with hepatology (Dr. Hillary Glez) hx of autoimmune hepatitis in the past    SPEP: The albumin is decreased and a polyclonal hypergammaproteinemia is present. This pattern may occur in acute and chronic inflammatory diseases and chronic liver disease. No monoclonal protein identified. SIFE: Normal. No monoclonal protein identified. Beta-2 microglobulin 3.5 (0.6-2.4)  IgA 586   ()  IgG 1765 (700-1600)  IgM 115   ()  [place] FLC 92.06  [place] FLC 67.76  K/L ratio 1.36    TSH 1.770   Zinc 65.9 ()  Folate   3.4 (7.5-42.6); prescribed Folic acid 1 mg po daily. ViTB12 269 (211-946); Prescribed VitB12 1000 mcg po daily;   Homocysteine 61.6 (4-82); on Folic acid  Methylmalonic acid WNL    VitB6 8.6 (); Prescribed ViTB6 50 mg po daily     Peripheral blood flow cytometry noted no immunophenotypic evidence of acute leukemia or T-cell or B-cell neoplasm. Peripheral blood MDS FISH Negative study  JAK2 Mutation Not detected    Review of Systems;  CONSTITUTIONAL: No fever, chills. Fair appetite and energy level. ENMT: Eyes: No diplopia; Nose: No epistaxis. Mouth: No sore throat. RESPIRATORY: No hemoptysis. Shortness of breath on exertion  CARDIOVASCULAR: No chest pain, palpitations. GASTROINTESTINAL: No nausea/vomiting, abdominal pain, diarrhea/constipation. No melena or hematochezia  GENITOURINARY: No dysuria, urinary frequency, hematuria. NEURO: No syncope, presyncope, headache. Remainder:  ROS NEGATIVE    Past Medical History:      Diagnosis Date    Diabetes mellitus ([facility] [address]     Hepatitis C antibody test positive     Hyperlipidemia     Hypertension     MI, old      Medications:  Reviewed and reconciled. Allergies: Allergies   Allergen Reactions    Codeine      Physical Exam:  /67   Pulse 85   Temp 97.8 °F (36.6 °C)   Ht 5' 9\" (1.753 m)   Wt 194 lb (88 kg)   SpO2 93%   BMI 28.65 kg/m²   GENERAL: Alert, oriented x 3, not in acute distress. HEENT: PERRLA; EOMI. Oropharynx clear. NECK: Supple. Without lymphadenopathy. LUNGS: Good air entry bilaterally.  No wheezing, crackles or ronchi. CARDIOVASCULAR: Regular rate. No murmurs, rubs or gallops. ABDOMEN: Soft. Non-tender, non-distended. Positive bowel sounds. EXTREMITIES: Without clubbing, cyanosis, or edema. NEUROLOGIC: No focal deficits. Impression/Plan:  72 y/o male with hx of HTN, Hyperlipidemia, DMII, CAD, who was in the ED on 01/17/2021 and found to have microcytic anemia. Hb 7.0  Hct 24.7   MCV 73.5     WBC 9.0  Guaiac negative in ED; Evaluated by GI team while inpatient  EGD by Dr. Harrison Levy on 01/19/2020: Normal upper endoscopy. A. Duodenum, biopsy:   Multiple fragments of duodenal mucosa with no significant pathologic findings. Architecture is intact with appropriate villous to crypt length ratios. Negative for increased intraepithelial lymphocytes. B. Stomach, antrum, biopsy:   Mild reactive gastropathy changes with superimposed minimal chronic gastritis. Immunostain for Helicobacter pylori organisms is negative. Negative for intestinal metaplasia. Anemia, possible GI source. Restarted ASA and Plavix   D/C on 01/19/2020 on FeSO4 325 mg po daily. Outpatient colonoscopy, capsule endoscopy done unremarkable per patient (records requested)    On 03/25/2021: Hb 11.1  Hct 34.1   MCV 97     WBC 9.3  Fe 107    Referred to our clinic for further evaluation. No fever, chills. Fair appetite and energy level. No bleeding noted recently. Extensive blood work 04/16/2021  Hb 11.3 Hct 34.3  .4  WBC 7.3      Fe 287 TIBC 323  FeSat 89% Ferritin 46; D/C FeSO4  Hemolytic work-up negative  Epo 55 (4-27)    Peripheral blood smear:  Platelets are adequate and unremarkable.    There is a macrocytic anemia with anisopoikilocytosis and slight polychromasia. Megaloblastic changes not seen.    Absolute neutrophils, monocytes, eosinophils, and basophils are present in normal numbers. Absolute lymphocytes are decreased.      BUN 18  Creat 1.0    AlcP 203  AST 53; F/U with hepatology (Dr. Nabeel Kunz) hx of autoimmune hepatitis in the past    SPEP: The albumin is decreased and a polyclonal hypergammaproteinemia is present. This pattern may occur in acute and chronic inflammatory diseases and chronic liver disease. No monoclonal protein identified. SIFE: Normal. No monoclonal protein identified. Beta-2 microglobulin 3.5 (0.6-2.4)  IgA 586   ()  IgG 1765 (700-1600)  IgM 115   ()  [place] FLC 92.06  [place] FLC 67.76  K/L ratio 1.36    TSH 1.770   Zinc 65.9 ()  Folate   3.4 (1.0-78.9); prescribed Folic acid 1 mg po daily. ViTB12 269 (211-946); Prescribed VitB12 1000 mcg po daily;   Homocysteine 48.8 (4-33); on Folic acid  Methylmalonic acid WNL    VitB6 8.6 (); Prescribed ViTB6 50 mg po daily     Peripheral blood flow cytometry noted no immunophenotypic evidence of acute leukemia or T-cell or B-cell neoplasm.    Peripheral blood MDS FISH Negative study  JAK2 Mutation Not detected    On 06/04/2021:  Hb 9.8  Hct 30.8  MCV 98.7  WBC 8.0     BUN 14 Creat 0.9  Folate >20; VitB12 617  Fe 39 TIBC 315 FeSat 12% Ferritin 32  Recommended FeSO4 325 mg po bid    RTC 4 weeks with prior labs (CBC, Iron studies, Folate/B12, B6)    Mitul Gautam MD   6/4/2021

## 2021-06-23 RX ORDER — PYRIDOXINE HCL (VITAMIN B6) 50 MG
50 TABLET ORAL DAILY
Qty: 30 TABLET | Refills: 5 | Status: SHIPPED | OUTPATIENT
Start: 2021-06-23 | End: 2021-09-17 | Stop reason: SDUPTHER

## 2021-06-29 DIAGNOSIS — D64.9 NORMOCYTIC ANEMIA: ICD-10-CM

## 2021-06-29 DIAGNOSIS — D53.9 MACROCYTIC ANEMIA: Primary | ICD-10-CM

## 2021-07-01 ENCOUNTER — HOSPITAL ENCOUNTER (OUTPATIENT)
Dept: INFUSION THERAPY | Age: 71
Discharge: HOME | End: 2021-07-01
Payer: MEDICARE

## 2021-07-01 DIAGNOSIS — D53.9 MACROCYTIC ANEMIA: ICD-10-CM

## 2021-07-01 DIAGNOSIS — D64.9 NORMOCYTIC ANEMIA: ICD-10-CM

## 2021-07-01 LAB
ALBUMIN SERPL-MCNC: 3.4 G/DL (ref 3.5–5.2)
ALP BLD-CCNC: 123 U/L (ref 40–129)
ALT SERPL-CCNC: 32 U/L (ref 0–40)
ANION GAP SERPL CALCULATED.3IONS-SCNC: 19 MMOL/L (ref 7–16)
AST SERPL-CCNC: 57 U/L (ref 0–39)
BASOPHILS ABSOLUTE: 0.09 E9/L (ref 0–0.2)
BASOPHILS RELATIVE PERCENT: 1.4 % (ref 0–2)
BILIRUB SERPL-MCNC: 0.5 MG/DL (ref 0–1.2)
BUN BLDV-MCNC: 13 MG/DL (ref 6–23)
CALCIUM SERPL-MCNC: 8.9 MG/DL (ref 8.6–10.2)
CHLORIDE BLD-SCNC: 107 MMOL/L (ref 98–107)
CO2: 19 MMOL/L (ref 22–29)
CREAT SERPL-MCNC: 1.1 MG/DL (ref 0.7–1.2)
EOSINOPHILS ABSOLUTE: 0.36 E9/L (ref 0.05–0.5)
EOSINOPHILS RELATIVE PERCENT: 5.5 % (ref 0–6)
FERRITIN: 35 NG/ML
GFR AFRICAN AMERICAN: >60
GFR NON-AFRICAN AMERICAN: >60 ML/MIN/1.73
GLUCOSE BLD-MCNC: 53 MG/DL (ref 74–99)
HCT VFR BLD CALC: 32.3 % (ref 37–54)
HEMOGLOBIN: 10 G/DL (ref 12.5–16.5)
IMMATURE GRANULOCYTES #: 0.04 E9/L
IMMATURE GRANULOCYTES %: 0.6 % (ref 0–5)
IRON SATURATION: 40 % (ref 20–55)
IRON: 122 MCG/DL (ref 59–158)
LYMPHOCYTES ABSOLUTE: 1.22 E9/L (ref 1.5–4)
LYMPHOCYTES RELATIVE PERCENT: 18.8 % (ref 20–42)
MCH RBC QN AUTO: 30.8 PG (ref 26–35)
MCHC RBC AUTO-ENTMCNC: 31 % (ref 32–34.5)
MCV RBC AUTO: 99.4 FL (ref 80–99.9)
MONOCYTES ABSOLUTE: 0.89 E9/L (ref 0.1–0.95)
MONOCYTES RELATIVE PERCENT: 13.7 % (ref 2–12)
NEUTROPHILS ABSOLUTE: 3.89 E9/L (ref 1.8–7.3)
NEUTROPHILS RELATIVE PERCENT: 60 % (ref 43–80)
PDW BLD-RTO: 19 FL (ref 11.5–15)
PLATELET # BLD: 210 E9/L (ref 130–450)
PMV BLD AUTO: 10.3 FL (ref 7–12)
POTASSIUM SERPL-SCNC: 4.9 MMOL/L (ref 3.5–5)
RBC # BLD: 3.25 E12/L (ref 3.8–5.8)
SODIUM BLD-SCNC: 145 MMOL/L (ref 132–146)
TOTAL IRON BINDING CAPACITY: 306 MCG/DL (ref 250–450)
TOTAL PROTEIN: 7.4 G/DL (ref 6.4–8.3)
WBC # BLD: 6.5 E9/L (ref 4.5–11.5)

## 2021-07-01 PROCEDURE — 80053 COMPREHEN METABOLIC PANEL: CPT

## 2021-07-01 PROCEDURE — 36415 COLL VENOUS BLD VENIPUNCTURE: CPT

## 2021-07-01 PROCEDURE — 85025 COMPLETE CBC W/AUTO DIFF WBC: CPT

## 2021-07-01 PROCEDURE — 82728 ASSAY OF FERRITIN: CPT

## 2021-07-01 PROCEDURE — 83540 ASSAY OF IRON: CPT

## 2021-07-01 PROCEDURE — 83550 IRON BINDING TEST: CPT

## 2021-07-01 PROCEDURE — 84207 ASSAY OF VITAMIN B-6: CPT

## 2021-07-02 ENCOUNTER — OFFICE VISIT (OUTPATIENT)
Dept: ONCOLOGY | Age: 71
End: 2021-07-02
Payer: MEDICARE

## 2021-07-02 VITALS
DIASTOLIC BLOOD PRESSURE: 65 MMHG | RESPIRATION RATE: 18 BRPM | HEIGHT: 69 IN | OXYGEN SATURATION: 96 % | TEMPERATURE: 98.2 F | BODY MASS INDEX: 28.93 KG/M2 | SYSTOLIC BLOOD PRESSURE: 138 MMHG | WEIGHT: 195.3 LBS | HEART RATE: 84 BPM

## 2021-07-02 DIAGNOSIS — D64.9 NORMOCYTIC ANEMIA: Primary | ICD-10-CM

## 2021-07-02 PROCEDURE — 99212 OFFICE O/P EST SF 10 MIN: CPT

## 2021-07-02 PROCEDURE — 99213 OFFICE O/P EST LOW 20 MIN: CPT | Performed by: INTERNAL MEDICINE

## 2021-07-02 NOTE — PROGRESS NOTES
[place] [id number]  Attending Clinic Note    Reason for Visit: Follow-up on a patient with Anemia    PCP:  Pratik Vasquez DO    History of Present Illness:  74 y/o male with hx of HTN, Hyperlipidemia, DMII, CAD, who was in the ED on 01/17/2021 and found to have microcytic anemia. Hb 7.0  Hct 24.7   MCV 73.5     WBC 9.0  Guaiac negative in ED; Evaluated by GI team while inpatient  EGD by Dr. Endy Coleman on 01/19/2020: Normal upper endoscopy. A. Duodenum, biopsy:   Multiple fragments of duodenal mucosa with no significant pathologic findings. Architecture is intact with appropriate villous to crypt length ratios. Negative for increased intraepithelial lymphocytes. B. Stomach, antrum, biopsy:   Mild reactive gastropathy changes with superimposed minimal chronic gastritis. Immunostain for Helicobacter pylori organisms is negative. Negative for intestinal metaplasia. Anemia, possible GI source. Restarted ASA and Plavix   D/C on 01/19/2020 on FeSO4 325 mg po daily. Outpatient colonoscopy, capsule endoscopy done unremarkable per patient (records requested)    On 03/25/2021: Hb 11.1  Hct 34.1   MCV 97     WBC 9.3  Fe 107    Referred to our clinic for further evaluation. No fever, chills. Fair appetite and energy level. No bleeding noted recently. Extensive blood work 04/16/2021  Hb 11.3 Hct 34.3  .4  WBC 7.3      Fe 287 TIBC 323  FeSat 89% Ferritin 46; D/C FeSO4  Hemolytic work-up negative  Epo 55 (4-27)    Peripheral blood smear:  Platelets are adequate and unremarkable.    There is a macrocytic anemia with anisopoikilocytosis and slight polychromasia. Megaloblastic changes not seen.    Absolute neutrophils, monocytes, eosinophils, and basophils are present in normal numbers. Absolute lymphocytes are decreased.      BUN 18  Creat 1.0    AlcP 203  AST 53; F/U with hepatology (Dr. Danis Douglass) hx of autoimmune hepatitis in the past    SPEP: The albumin is decreased and a polyclonal hypergammaproteinemia is present. This pattern may occur in acute and chronic inflammatory diseases and chronic liver disease. No monoclonal protein identified. SIFE: Normal. No monoclonal protein identified. Beta-2 microglobulin 3.5 (0.6-2.4)  IgA 586   ()  IgG 1765 (700-1600)  IgM 115   ()  [place] FLC 92.06  [place] FLC 67.76  K/L ratio 1.36    TSH 1.770   Zinc 65.9 ()  Folate   3.4 (9.7-23.7); prescribed Folic acid 1 mg po daily. ViTB12 269 (211-946); Prescribed VitB12 1000 mcg po daily;   Homocysteine 48.6 (3-74); on Folic acid  Methylmalonic acid WNL    VitB6 8.6 (); Prescribed ViTB6 50 mg po daily     Peripheral blood flow cytometry noted no immunophenotypic evidence of acute leukemia or T-cell or B-cell neoplasm. Peripheral blood MDS FISH Negative study  JAK2 Mutation Not detected    On 06/04/2021:  Hb 9.8  Hct 30.8  MCV 98.7  WBC 8.0     BUN 14 Creat 0.9  Folate >20; VitB12 617  Fe 39 TIBC 315 FeSat 12% Ferritin 32  Recommended FeSO4 325 mg po bid    On 07/01/2021:  Hb 10.0  Hct 32.3  MCV 99.4  WBC 6.5     BUN 13  Creat 1.1  Fe 122 TIBC 306 FeSat 40% Ferritin 35  Continue FeSO4 325 mg po bid    Review of Systems;  CONSTITUTIONAL: No fever, chills. Fair appetite and energy level. ENMT: Eyes: No diplopia; Nose: No epistaxis. Mouth: No sore throat. RESPIRATORY: No hemoptysis. Shortness of breath on exertion  CARDIOVASCULAR: No chest pain, palpitations. GASTROINTESTINAL: No nausea/vomiting, abdominal pain, diarrhea/constipation. No melena or hematochezia  GENITOURINARY: No dysuria, urinary frequency, hematuria. NEURO: No syncope, presyncope, headache. Remainder:  ROS NEGATIVE    Past Medical History:      Diagnosis Date    Diabetes mellitus ([facility] [address]     Hepatitis C antibody test positive     Hyperlipidemia     Hypertension     MI, old      Medications:  Reviewed and reconciled. Allergies:   Allergies   Allergen Reactions    Codeine      Physical Exam:  /65   Pulse 84   Temp 98.2 °F (36.8 °C)   Resp 18   Ht 5' 9\" (1.753 m)   Wt 195 lb 4.8 oz (88.6 kg)   SpO2 96%   BMI 28.84 kg/m²   GENERAL: Alert, oriented x 3, not in acute distress. HEENT: PERRLA; EOMI. Oropharynx clear. NECK: Supple. Without lymphadenopathy. LUNGS: Good air entry bilaterally. No wheezing, crackles or ronchi. CARDIOVASCULAR: Regular rate. No murmurs, rubs or gallops. ABDOMEN: Soft. Non-tender, non-distended. Positive bowel sounds. EXTREMITIES: Without clubbing, cyanosis, or edema. NEUROLOGIC: No focal deficits. Impression/Plan:  74 y/o male with hx of HTN, Hyperlipidemia, DMII, CAD, who was in the ED on 01/17/2021 and found to have microcytic anemia. Hb 7.0  Hct 24.7   MCV 73.5     WBC 9.0  Guaiac negative in ED; Evaluated by GI team while inpatient  EGD by Dr. Navjot Sullivan on 01/19/2020: Normal upper endoscopy. A. Duodenum, biopsy:   Multiple fragments of duodenal mucosa with no significant pathologic findings. Architecture is intact with appropriate villous to crypt length ratios. Negative for increased intraepithelial lymphocytes. B. Stomach, antrum, biopsy:   Mild reactive gastropathy changes with superimposed minimal chronic gastritis. Immunostain for Helicobacter pylori organisms is negative. Negative for intestinal metaplasia. Anemia, possible GI source. Restarted ASA and Plavix   D/C on 01/19/2020 on FeSO4 325 mg po daily. Outpatient colonoscopy, capsule endoscopy done unremarkable per patient (records requested)    On 03/25/2021: Hb 11.1  Hct 34.1   MCV 97     WBC 9.3  Fe 107    Referred to our clinic for further evaluation. No fever, chills. Fair appetite and energy level. No bleeding noted recently.      Extensive blood work 04/16/2021  Hb 11.3 Hct 34.3  .4  WBC 7.3      Fe 287 TIBC 323  FeSat 89% Ferritin 46; D/C FeSO4  Hemolytic work-up negative  Epo 55 (4-27)    Peripheral blood smear:  Platelets are adequate and unremarkable.    There is a macrocytic anemia with anisopoikilocytosis and slight polychromasia. Megaloblastic changes not seen.    Absolute neutrophils, monocytes, eosinophils, and basophils are present in normal numbers. Absolute lymphocytes are decreased. BUN 18  Creat 1.0    AlcP 203  AST 53; F/U with hepatology (Dr. Lavelle Arreola) hx of autoimmune hepatitis in the past    SPEP: The albumin is decreased and a polyclonal hypergammaproteinemia is present. This pattern may occur in acute and chronic inflammatory diseases and chronic liver disease. No monoclonal protein identified. SIFE: Normal. No monoclonal protein identified. Beta-2 microglobulin 3.5 (0.6-2.4)  IgA 586   ()  IgG 1765 (700-1600)  IgM 115   ()  [place] FLC 92.06  [place] FLC 67.76  K/L ratio 1.36    TSH 1.770   Zinc 65.9 ()  Folate   3.4 (1.0-27.4); prescribed Folic acid 1 mg po daily. ViTB12 269 (211-946); Prescribed VitB12 1000 mcg po daily;   Homocysteine 58.0 (1-96); on Folic acid  Methylmalonic acid WNL    VitB6 8.6 (); Prescribed ViTB6 50 mg po daily     Peripheral blood flow cytometry noted no immunophenotypic evidence of acute leukemia or T-cell or B-cell neoplasm. Peripheral blood MDS FISH Negative study  JAK2 Mutation Not detected    On 06/04/2021:  Hb 9.8  Hct 30.8  MCV 98.7  WBC 8.0     BUN 14 Creat 0.9  Folate >20; VitB12 617  Fe 39 TIBC 315 FeSat 12% Ferritin 32  Recommended FeSO4 325 mg po bid    On 07/01/2021:  Hb 10.0  Hct 32.3  MCV 99.4  WBC 6.5     BUN 13  Creat 1.1  Fe 122 TIBC 306 FeSat 40% Ferritin 35  Labs reviewed. Continue FeSO4 325 mg po bid.  Continue Folic acid, VitB6, [id number]    RTC 4 weeks with prior labs (CBC, Iron studies, Folate/B12, B6)    Wendy Colbert MD   7/2/2021

## 2021-07-04 LAB — VITAMIN B6: 107.3 NMOL/L (ref 20–125)

## 2021-07-29 ENCOUNTER — HOSPITAL ENCOUNTER (OUTPATIENT)
Dept: INFUSION THERAPY | Age: 71
Discharge: HOME | End: 2021-07-29
Payer: MEDICARE

## 2021-07-29 DIAGNOSIS — D64.9 NORMOCYTIC ANEMIA: Primary | ICD-10-CM

## 2021-07-29 DIAGNOSIS — D53.9 MACROCYTIC ANEMIA: ICD-10-CM

## 2021-07-29 DIAGNOSIS — D64.9 NORMOCYTIC ANEMIA: ICD-10-CM

## 2021-07-29 LAB
ALBUMIN SERPL-MCNC: 3.4 G/DL (ref 3.5–5.2)
ALP BLD-CCNC: 134 U/L (ref 40–129)
ALT SERPL-CCNC: 32 U/L (ref 0–40)
ANION GAP SERPL CALCULATED.3IONS-SCNC: 17 MMOL/L (ref 7–16)
ANISOCYTOSIS: ABNORMAL
AST SERPL-CCNC: 62 U/L (ref 0–39)
BASOPHILS ABSOLUTE: 0.11 E9/L (ref 0–0.2)
BASOPHILS RELATIVE PERCENT: 1.8 % (ref 0–2)
BILIRUB SERPL-MCNC: 0.5 MG/DL (ref 0–1.2)
BUN BLDV-MCNC: 12 MG/DL (ref 6–23)
BURR CELLS: ABNORMAL
CALCIUM SERPL-MCNC: 8.8 MG/DL (ref 8.6–10.2)
CHLORIDE BLD-SCNC: 103 MMOL/L (ref 98–107)
CO2: 20 MMOL/L (ref 22–29)
CREAT SERPL-MCNC: 0.9 MG/DL (ref 0.7–1.2)
EOSINOPHILS ABSOLUTE: 0.38 E9/L (ref 0.05–0.5)
EOSINOPHILS RELATIVE PERCENT: 6.1 % (ref 0–6)
FERRITIN: 46 NG/ML
FOLATE: >20 NG/ML (ref 4.8–24.2)
GFR AFRICAN AMERICAN: >60
GFR NON-AFRICAN AMERICAN: >60 ML/MIN/1.73
GLUCOSE BLD-MCNC: 45 MG/DL (ref 74–99)
HCT VFR BLD CALC: 31.8 % (ref 37–54)
HEMOGLOBIN: 10.1 G/DL (ref 12.5–16.5)
IRON SATURATION: 42 % (ref 20–55)
IRON: 128 MCG/DL (ref 59–158)
LYMPHOCYTES ABSOLUTE: 0.93 E9/L (ref 1.5–4)
LYMPHOCYTES RELATIVE PERCENT: 14.9 % (ref 20–42)
MCH RBC QN AUTO: 31.7 PG (ref 26–35)
MCHC RBC AUTO-ENTMCNC: 31.8 % (ref 32–34.5)
MCV RBC AUTO: 99.7 FL (ref 80–99.9)
MONOCYTES ABSOLUTE: 0.56 E9/L (ref 0.1–0.95)
MONOCYTES RELATIVE PERCENT: 8.8 % (ref 2–12)
NEUTROPHILS ABSOLUTE: 4.22 E9/L (ref 1.8–7.3)
NEUTROPHILS RELATIVE PERCENT: 68.4 % (ref 43–80)
NUCLEATED RED BLOOD CELLS: 0.9 /100 WBC
OVALOCYTES: ABNORMAL
PDW BLD-RTO: 21.6 FL (ref 11.5–15)
PLATELET # BLD: 241 E9/L (ref 130–450)
PMV BLD AUTO: 10.1 FL (ref 7–12)
POIKILOCYTES: ABNORMAL
POLYCHROMASIA: ABNORMAL
POTASSIUM SERPL-SCNC: 4.3 MMOL/L (ref 3.5–5)
RBC # BLD: 3.19 E12/L (ref 3.8–5.8)
SODIUM BLD-SCNC: 140 MMOL/L (ref 132–146)
TOTAL IRON BINDING CAPACITY: 306 MCG/DL (ref 250–450)
TOTAL PROTEIN: 7.5 G/DL (ref 6.4–8.3)
VITAMIN B-12: 994 PG/ML (ref 211–946)
WBC # BLD: 6.2 E9/L (ref 4.5–11.5)

## 2021-07-29 PROCEDURE — 82607 VITAMIN B-12: CPT

## 2021-07-29 PROCEDURE — 83550 IRON BINDING TEST: CPT

## 2021-07-29 PROCEDURE — 80053 COMPREHEN METABOLIC PANEL: CPT

## 2021-07-29 PROCEDURE — 82746 ASSAY OF FOLIC ACID SERUM: CPT

## 2021-07-29 PROCEDURE — 83540 ASSAY OF IRON: CPT

## 2021-07-29 PROCEDURE — 36415 COLL VENOUS BLD VENIPUNCTURE: CPT

## 2021-07-29 PROCEDURE — 82728 ASSAY OF FERRITIN: CPT

## 2021-07-29 PROCEDURE — 85025 COMPLETE CBC W/AUTO DIFF WBC: CPT

## 2021-07-30 ENCOUNTER — OFFICE VISIT (OUTPATIENT)
Dept: ONCOLOGY | Age: 71
End: 2021-07-30
Payer: MEDICARE

## 2021-07-30 VITALS
TEMPERATURE: 98.4 F | BODY MASS INDEX: 28.64 KG/M2 | HEART RATE: 95 BPM | DIASTOLIC BLOOD PRESSURE: 73 MMHG | RESPIRATION RATE: 18 BRPM | WEIGHT: 193.4 LBS | SYSTOLIC BLOOD PRESSURE: 133 MMHG | HEIGHT: 69 IN | OXYGEN SATURATION: 92 %

## 2021-07-30 DIAGNOSIS — D64.9 NORMOCYTIC ANEMIA: Primary | ICD-10-CM

## 2021-07-30 PROCEDURE — 99212 OFFICE O/P EST SF 10 MIN: CPT

## 2021-07-30 PROCEDURE — 99213 OFFICE O/P EST LOW 20 MIN: CPT | Performed by: INTERNAL MEDICINE

## 2021-07-30 NOTE — PROGRESS NOTES
[place] [id number]  Attending Clinic Note    Reason for Visit: Follow-up on a patient with Anemia    PCP:  Yosef Crowley DO    History of Present Illness:  74 y/o male with hx of HTN, Hyperlipidemia, DMII, CAD, who was in the ED on 01/17/2021 and found to have microcytic anemia. Hb 7.0  Hct 24.7   MCV 73.5     WBC 9.0  Guaiac negative in ED; Evaluated by GI team while inpatient  EGD by Dr. Adolph Jung on 01/19/2020: Normal upper endoscopy. A. Duodenum, biopsy:   Multiple fragments of duodenal mucosa with no significant pathologic findings. Architecture is intact with appropriate villous to crypt length ratios. Negative for increased intraepithelial lymphocytes. B. Stomach, antrum, biopsy:   Mild reactive gastropathy changes with superimposed minimal chronic gastritis. Immunostain for Helicobacter pylori organisms is negative. Negative for intestinal metaplasia. Anemia, possible GI source. Restarted ASA and Plavix   D/C on 01/19/2020 on FeSO4 325 mg po daily. Outpatient colonoscopy, capsule endoscopy done unremarkable per patient (records requested)    On 03/25/2021: Hb 11.1  Hct 34.1   MCV 97     WBC 9.3  Fe 107    Referred to our clinic for further evaluation. No fever, chills. Fair appetite and energy level. No bleeding noted recently. Extensive blood work 04/16/2021  Hb 11.3 Hct 34.3  .4  WBC 7.3      Fe 287 TIBC 323  FeSat 89% Ferritin 46; D/C FeSO4  Hemolytic work-up negative  Epo 55 (4-27)    Peripheral blood smear:  Platelets are adequate and unremarkable.    There is a macrocytic anemia with anisopoikilocytosis and slight polychromasia. Megaloblastic changes not seen.    Absolute neutrophils, monocytes, eosinophils, and basophils are present in normal numbers. Absolute lymphocytes are decreased.      BUN 18  Creat 1.0    AlcP 203  AST 53; F/U with hepatology (Dr. Luis Carlos Chapman) hx of autoimmune hepatitis in the past    SPEP: The albumin is decreased and a polyclonal hypergammaproteinemia is present. This pattern may occur in acute and chronic inflammatory diseases and chronic liver disease. No monoclonal protein identified. SIFE: Normal. No monoclonal protein identified. Beta-2 microglobulin 3.5 (0.6-2.4)  IgA 586   ()  IgG 1765 (700-1600)  IgM 115   ()  [place] FLC 92.06  [place] FLC 67.76  K/L ratio 1.36    TSH 1.770   Zinc 65.9 ()  Folate   3.4 (7.9-29.8); prescribed Folic acid 1 mg po daily. ViTB12 269 (211-946); Prescribed VitB12 1000 mcg po daily;   Homocysteine 67.8 (3-31); on Folic acid  Methylmalonic acid WNL    VitB6 8.6 (); Prescribed ViTB6 50 mg po daily     Peripheral blood flow cytometry noted no immunophenotypic evidence of acute leukemia or T-cell or B-cell neoplasm. Peripheral blood MDS FISH Negative study  JAK2 Mutation Not detected    On 06/04/2021:  Hb 9.8  Hct 30.8  MCV 98.7  WBC 8.0     BUN 14 Creat 0.9  Folate >20; VitB12 617  Fe 39 TIBC 315 FeSat 12% Ferritin 32  Recommended FeSO4 325 mg po bid    On 07/01/2021:  Hb 10.0  Hct 32.3  MCV 99.4  WBC 6.5     BUN 13  Creat 1.1  Fe 122 TIBC 306 FeSat 40% Ferritin 35  VitB6 107.3  Continued FeSO4 325 mg po bid. Continued Folic acid, VitB6, [id number]    Review of Systems;  CONSTITUTIONAL: No fever, chills. Fair appetite and energy level. ENMT: Eyes: No diplopia; Nose: No epistaxis. Mouth: No sore throat. RESPIRATORY: No hemoptysis. Shortness of breath on exertion  CARDIOVASCULAR: No chest pain, palpitations. GASTROINTESTINAL: No nausea/vomiting, abdominal pain, diarrhea/constipation. No melena or hematochezia  GENITOURINARY: No dysuria, urinary frequency, hematuria. NEURO: No syncope, presyncope, headache.   Remainder:  ROS NEGATIVE    Past Medical History:      Diagnosis Date    Diabetes mellitus ([facility] [address]     Hepatitis C antibody test positive     Hyperlipidemia     Hypertension     MI, old      Medications:  Reviewed and reconciled. Allergies: Allergies   Allergen Reactions    Codeine      Physical Exam:  /73   Pulse 95   Temp 98.4 °F (36.9 °C)   Resp 18   Ht 5' 9\" (1.753 m)   Wt 193 lb 6.4 oz (87.7 kg)   SpO2 92%   BMI 28.56 kg/m²   GENERAL: Alert, oriented x 3, not in acute distress. HEENT: PERRLA; EOMI. Oropharynx clear. NECK: Supple. Without lymphadenopathy. LUNGS: Good air entry bilaterally. No wheezing, crackles or ronchi. CARDIOVASCULAR: Regular rate. No murmurs, rubs or gallops. ABDOMEN: Soft. Non-tender, non-distended. Positive bowel sounds. EXTREMITIES: Without clubbing, cyanosis, or edema. NEUROLOGIC: No focal deficits. Impression/Plan:  72 y/o male with hx of HTN, Hyperlipidemia, DMII, CAD, who was in the ED on 01/17/2021 and found to have microcytic anemia. Hb 7.0  Hct 24.7   MCV 73.5     WBC 9.0  Guaiac negative in ED; Evaluated by GI team while inpatient  EGD by Dr. Lizzy Chavez on 01/19/2020: Normal upper endoscopy. A. Duodenum, biopsy:   Multiple fragments of duodenal mucosa with no significant pathologic findings. Architecture is intact with appropriate villous to crypt length ratios. Negative for increased intraepithelial lymphocytes. B. Stomach, antrum, biopsy:   Mild reactive gastropathy changes with superimposed minimal chronic gastritis. Immunostain for Helicobacter pylori organisms is negative. Negative for intestinal metaplasia. Anemia, possible GI source. Restarted ASA and Plavix   D/C on 01/19/2020 on FeSO4 325 mg po daily. Outpatient colonoscopy, capsule endoscopy done unremarkable per patient (records requested)    On 03/25/2021: Hb 11.1  Hct 34.1   MCV 97     WBC 9.3  Fe 107    Referred to our clinic for further evaluation. No fever, chills. Fair appetite and energy level. No bleeding noted recently.      Extensive blood work 04/16/2021  Hb 11.3 Hct 34.3  .4  WBC 7.3      Fe 287 TIBC 323  FeSat 89% Ferritin 46; D/C FeSO4  Hemolytic work-up negative  Epo 55 (4-27)    Peripheral blood smear:  Platelets are adequate and unremarkable.    There is a macrocytic anemia with anisopoikilocytosis and slight polychromasia. Megaloblastic changes not seen.    Absolute neutrophils, monocytes, eosinophils, and basophils are present in normal numbers. Absolute lymphocytes are decreased. BUN 18  Creat 1.0    AlcP 203  AST 53; F/U with hepatology (Dr. Ashlyn Valera) hx of autoimmune hepatitis in the past    SPEP: The albumin is decreased and a polyclonal hypergammaproteinemia is present. This pattern may occur in acute and chronic inflammatory diseases and chronic liver disease. No monoclonal protein identified. SIFE: Normal. No monoclonal protein identified. Beta-2 microglobulin 3.5 (0.6-2.4)  IgA 586   ()  IgG 1765 (700-1600)  IgM 115   ()  [place] FLC 92.06  [place] FLC 67.76  K/L ratio 1.36    TSH 1.770   Zinc 65.9 ()  Folate   3.4 (2.0-38.0); prescribed Folic acid 1 mg po daily. ViTB12 269 (211-946); Prescribed VitB12 1000 mcg po daily;   Homocysteine 77.9 (8-32); on Folic acid  Methylmalonic acid WNL    VitB6 8.6 (); Prescribed ViTB6 50 mg po daily     Peripheral blood flow cytometry noted no immunophenotypic evidence of acute leukemia or T-cell or B-cell neoplasm. Peripheral blood MDS FISH Negative study  JAK2 Mutation Not detected    On 06/04/2021:  Hb 9.8  Hct 30.8  MCV 98.7  WBC 8.0     BUN 14 Creat 0.9  Folate >20; VitB12 617  Fe 39 TIBC 315 FeSat 12% Ferritin 32  Recommended FeSO4 325 mg po bid    On 07/01/2021:  Hb 10.0  Hct 32.3  MCV 99.4  WBC 6.5     BUN 13  Creat 1.1  Fe 122 TIBC 306 FeSat 40% Ferritin 35  VitB6 107.3  Continued FeSO4 325 mg po bid. Continued Folic acid, VitB6, [id number]    On 07/29/2021:  Hb 10.1  Hct 31.8   MCV 99.7  WBC 6.2     BUN 12  Creat 0.9  Fe 128 TIBC 306  FeSat 42%  Ferritin 46  Folate >20  VitB12 994  Continue FeSO4 325 mg po bid.  Continue Folic acid, VitB6, VitB12    RTC 6 weeks with prior labs (CBC, Iron studies, Folate/B12, B6)    Wilian Deutsch MD   7/30/2021

## 2021-09-09 ENCOUNTER — HOSPITAL ENCOUNTER (OUTPATIENT)
Dept: INFUSION THERAPY | Age: 71
End: 2021-09-09
Payer: MEDICARE

## 2021-09-09 DIAGNOSIS — D53.9 MACROCYTIC ANEMIA: ICD-10-CM

## 2021-09-09 DIAGNOSIS — D64.9 NORMOCYTIC ANEMIA: ICD-10-CM

## 2021-09-09 DIAGNOSIS — D64.9 NORMOCYTIC ANEMIA: Primary | ICD-10-CM

## 2021-09-16 ENCOUNTER — HOSPITAL ENCOUNTER (OUTPATIENT)
Dept: INFUSION THERAPY | Age: 71
Discharge: HOME | End: 2021-09-16
Payer: MEDICARE

## 2021-09-16 DIAGNOSIS — D64.9 NORMOCYTIC ANEMIA: Primary | ICD-10-CM

## 2021-09-16 DIAGNOSIS — D53.9 MACROCYTIC ANEMIA: ICD-10-CM

## 2021-09-16 DIAGNOSIS — D64.9 NORMOCYTIC ANEMIA: ICD-10-CM

## 2021-09-16 LAB
ALBUMIN SERPL-MCNC: 3.5 G/DL (ref 3.5–5.2)
ALP BLD-CCNC: 126 U/L (ref 40–129)
ALT SERPL-CCNC: 35 U/L (ref 0–40)
ANION GAP SERPL CALCULATED.3IONS-SCNC: 13 MMOL/L (ref 7–16)
ANISOCYTOSIS: ABNORMAL
AST SERPL-CCNC: 64 U/L (ref 0–39)
BASOPHILS ABSOLUTE: 0.11 E9/L (ref 0–0.2)
BASOPHILS RELATIVE PERCENT: 1.7 % (ref 0–2)
BILIRUB SERPL-MCNC: 0.6 MG/DL (ref 0–1.2)
BUN BLDV-MCNC: 15 MG/DL (ref 6–23)
BURR CELLS: ABNORMAL
CALCIUM SERPL-MCNC: 8.9 MG/DL (ref 8.6–10.2)
CHLORIDE BLD-SCNC: 102 MMOL/L (ref 98–107)
CO2: 24 MMOL/L (ref 22–29)
CREAT SERPL-MCNC: 0.9 MG/DL (ref 0.7–1.2)
EOSINOPHILS ABSOLUTE: 0.06 E9/L (ref 0.05–0.5)
EOSINOPHILS RELATIVE PERCENT: 0.9 % (ref 0–6)
FERRITIN: 58 NG/ML
GFR AFRICAN AMERICAN: >60
GFR NON-AFRICAN AMERICAN: >60 ML/MIN/1.73
GLUCOSE BLD-MCNC: 152 MG/DL (ref 74–99)
HCT VFR BLD CALC: 33.4 % (ref 37–54)
HEMOGLOBIN: 11 G/DL (ref 12.5–16.5)
IRON SATURATION: 35 % (ref 20–55)
IRON: 109 MCG/DL (ref 59–158)
LYMPHOCYTES ABSOLUTE: 0.65 E9/L (ref 1.5–4)
LYMPHOCYTES RELATIVE PERCENT: 10.4 % (ref 20–42)
MCH RBC QN AUTO: 33.3 PG (ref 26–35)
MCHC RBC AUTO-ENTMCNC: 32.9 % (ref 32–34.5)
MCV RBC AUTO: 101.2 FL (ref 80–99.9)
METAMYELOCYTES RELATIVE PERCENT: 0.9 % (ref 0–1)
MONOCYTES ABSOLUTE: 0.46 E9/L (ref 0.1–0.95)
MONOCYTES RELATIVE PERCENT: 7 % (ref 2–12)
NEUTROPHILS ABSOLUTE: 5.2 E9/L (ref 1.8–7.3)
NEUTROPHILS RELATIVE PERCENT: 79.1 % (ref 43–80)
OVALOCYTES: ABNORMAL
PDW BLD-RTO: 21.1 FL (ref 11.5–15)
PLATELET # BLD: 198 E9/L (ref 130–450)
PMV BLD AUTO: 10.3 FL (ref 7–12)
POIKILOCYTES: ABNORMAL
POLYCHROMASIA: ABNORMAL
POTASSIUM SERPL-SCNC: 5.1 MMOL/L (ref 3.5–5)
RBC # BLD: 3.3 E12/L (ref 3.8–5.8)
SODIUM BLD-SCNC: 139 MMOL/L (ref 132–146)
TOTAL IRON BINDING CAPACITY: 310 MCG/DL (ref 250–450)
TOTAL PROTEIN: 7.7 G/DL (ref 6.4–8.3)
WBC # BLD: 6.5 E9/L (ref 4.5–11.5)

## 2021-09-16 PROCEDURE — 80053 COMPREHEN METABOLIC PANEL: CPT

## 2021-09-16 PROCEDURE — 83540 ASSAY OF IRON: CPT

## 2021-09-16 PROCEDURE — 83550 IRON BINDING TEST: CPT

## 2021-09-16 PROCEDURE — 82728 ASSAY OF FERRITIN: CPT

## 2021-09-16 PROCEDURE — 85025 COMPLETE CBC W/AUTO DIFF WBC: CPT

## 2021-09-16 PROCEDURE — 36415 COLL VENOUS BLD VENIPUNCTURE: CPT

## 2021-09-17 ENCOUNTER — OFFICE VISIT (OUTPATIENT)
Dept: ONCOLOGY | Age: 71
End: 2021-09-17
Payer: MEDICARE

## 2021-09-17 VITALS
WEIGHT: 194.7 LBS | SYSTOLIC BLOOD PRESSURE: 152 MMHG | RESPIRATION RATE: 18 BRPM | HEIGHT: 69 IN | OXYGEN SATURATION: 95 % | BODY MASS INDEX: 28.84 KG/M2 | HEART RATE: 93 BPM | DIASTOLIC BLOOD PRESSURE: 76 MMHG | TEMPERATURE: 97.7 F

## 2021-09-17 DIAGNOSIS — D64.9 NORMOCYTIC ANEMIA: Primary | ICD-10-CM

## 2021-09-17 PROCEDURE — 99213 OFFICE O/P EST LOW 20 MIN: CPT | Performed by: INTERNAL MEDICINE

## 2021-09-17 PROCEDURE — 99212 OFFICE O/P EST SF 10 MIN: CPT

## 2021-09-17 RX ORDER — PYRIDOXINE HCL (VITAMIN B6) 50 MG
50 TABLET ORAL DAILY
Qty: 90 TABLET | Refills: 1 | Status: SHIPPED | OUTPATIENT
Start: 2021-09-17

## 2021-09-17 RX ORDER — FOLIC ACID 1 MG/1
1 TABLET ORAL DAILY
Qty: 90 TABLET | Refills: 1 | Status: SHIPPED | OUTPATIENT
Start: 2021-09-17

## 2021-09-17 RX ORDER — FERROUS SULFATE 325(65) MG
325 TABLET ORAL 2 TIMES DAILY
Qty: 180 TABLET | Refills: 1 | Status: SHIPPED | OUTPATIENT
Start: 2021-09-17

## 2021-09-17 NOTE — PROGRESS NOTES
NEGATIVE    Past Medical History:      Diagnosis Date    Diabetes mellitus ([facility] [address]     Hepatitis C antibody test positive     Hyperlipidemia     Hypertension     MI, old      Medications:  Reviewed and reconciled. Allergies: Allergies   Allergen Reactions    Codeine      Physical Exam:  BP (!) 152/76   Pulse 93   Temp 97.7 °F (36.5 °C)   Resp 18   Ht 5' 9\" (1.753 m)   Wt 194 lb 11.2 oz (88.3 kg)   SpO2 95%   BMI 28.75 kg/m²   GENERAL: Alert, oriented x 3, not in acute distress. EXTREMITIES: Without clubbing, cyanosis, or edema. NEUROLOGIC: No focal deficits. Lab Results   Component Value Date    WBC 6.5 09/16/2021    HGB 11.0 (L) 09/16/2021    HCT 33.4 (L) 09/16/2021    .2 (H) 09/16/2021     09/16/2021     Lab Results   Component Value Date     09/16/2021    K 5.1 (H) 09/16/2021     09/16/2021    CO2 24 09/16/2021    BUN 15 09/16/2021    CREATININE 0.9 09/16/2021    GLUCOSE 152 (H) 09/16/2021    CALCIUM 8.9 09/16/2021    PROT 7.7 09/16/2021    LABALBU 3.5 09/16/2021    BILITOT 0.6 09/16/2021    ALKPHOS 126 09/16/2021    AST 64 (H) 09/16/2021    ALT 35 09/16/2021    LABGLOM >60 09/16/2021    GFRAA >60 09/16/2021     Lab Results   Component Value Date    IRON 109 09/16/2021    TIBC 310 09/16/2021    FERRITIN 58 09/16/2021     Impression/Plan:  74 y/o male with hx of HTN, Hyperlipidemia, DMII, CAD, who was in the ED on 01/17/2021 and found to have microcytic anemia. Hb 7.0  Hct 24.7   MCV 73.5     WBC 9.0  Guaiac negative in ED; Evaluated by GI team while inpatient  EGD by Dr. Wilmer Bee on 01/19/2020: Normal upper endoscopy. A. Duodenum, biopsy:   Multiple fragments of duodenal mucosa with no significant pathologic findings. Architecture is intact with appropriate villous to crypt length ratios. Negative for increased intraepithelial lymphocytes. B. Stomach, antrum, biopsy:   Mild reactive gastropathy changes with superimposed minimal chronic gastritis. Immunostain for Helicobacter pylori organisms is negative. Negative for intestinal metaplasia. Anemia, possible GI source. Restarted ASA and Plavix   D/C on 01/19/2020 on FeSO4 325 mg po daily. Outpatient colonoscopy, capsule endoscopy done unremarkable per patient (records requested)    On 03/25/2021: Hb 11.1  Hct 34.1   MCV 97     WBC 9.3  Fe 107    Referred to our clinic for further evaluation. No fever, chills. Fair appetite and energy level. No bleeding noted recently. Extensive blood work 04/16/2021  Hb 11.3 Hct 34.3  .4  WBC 7.3      Fe 287 TIBC 323  FeSat 89% Ferritin 46; D/C FeSO4  Hemolytic work-up negative  Epo 55 (4-27)    Peripheral blood smear:  Platelets are adequate and unremarkable.    There is a macrocytic anemia with anisopoikilocytosis and slight polychromasia. Megaloblastic changes not seen.    Absolute neutrophils, monocytes, eosinophils, and basophils are present in normal numbers. Absolute lymphocytes are decreased. BUN 18  Creat 1.0    AlcP 203  AST 53; F/U with hepatology (Dr. Bernardo Dominguez) hx of autoimmune hepatitis in the past    SPEP: The albumin is decreased and a polyclonal hypergammaproteinemia is present. This pattern may occur in acute and chronic inflammatory diseases and chronic liver disease. No monoclonal protein identified. SIFE: Normal. No monoclonal protein identified. Beta-2 microglobulin 3.5 (0.6-2.4)  IgA 586   ()  IgG 1765 (700-1600)  IgM 115   ()  [place] FLC 92.06  [place] FLC 67.76  K/L ratio 1.36    TSH 1.770   Zinc 65.9 ()  Folate   3.4 (7.0-88.2); prescribed Folic acid 1 mg po daily. ViTB12 269 (211-946); Prescribed VitB12 1000 mcg po daily;   Homocysteine 47.5 (1-57); on Folic acid  Methylmalonic acid WNL    VitB6 8.6 (); Prescribed ViTB6 50 mg po daily     Peripheral blood flow cytometry noted no immunophenotypic evidence of acute leukemia or T-cell or B-cell neoplasm.    Peripheral blood MDS FISH Negative study  JAK2 Mutation Not detected    On 06/04/2021:  Hb 9.8  Hct 30.8  MCV 98.7  WBC 8.0     BUN 14 Creat 0.9  Folate >20; VitB12 617  Fe 39 TIBC 315 FeSat 12% Ferritin 32  Recommended FeSO4 325 mg po bid    On 07/01/2021:  Hb 10.0  Hct 32.3  MCV 99.4  WBC 6.5     BUN 13  Creat 1.1  Fe 122 TIBC 306 FeSat 40% Ferritin 35  VitB6 107.3  Continued FeSO4 325 mg po bid. Continued Folic acid, VitB6, [id number]    On 07/29/2021:  Hb 10.1  Hct 31.8   MCV 99.7  WBC 6.2     BUN 12  Creat 0.9  Fe 128 TIBC 306  FeSat 42%  Ferritin 46  Folate >20  VitB12 994  Continued FeSO4 325 mg po bid. Continue Folic acid, VitB6, [id number]    On 09/17/2021:  Hb 11.0  Hct 33.4  .2  WBC 6.5     BUN 15  Creat 0.9  Fe 109 TIBC 310  FeSat 35% Ferritin 58  Continue FeSO4 325 mg po bid.  Continue Folic acid, VitB6, [id number]    RTC 8 weeks with prior labs    Gely Izquierdo MD   9/17/2021

## 2021-10-26 ENCOUNTER — APPOINTMENT (OUTPATIENT)
Dept: GENERAL RADIOLOGY | Age: 71
DRG: 299 | End: 2021-10-26
Payer: MEDICARE

## 2021-10-26 ENCOUNTER — APPOINTMENT (OUTPATIENT)
Dept: CT IMAGING | Age: 71
DRG: 299 | End: 2021-10-26
Payer: MEDICARE

## 2021-10-26 ENCOUNTER — HOSPITAL ENCOUNTER (INPATIENT)
Age: 71
LOS: 7 days | Discharge: HOME | DRG: 299 | End: 2021-11-02
Attending: EMERGENCY MEDICINE | Admitting: INTERNAL MEDICINE
Payer: MEDICARE

## 2021-10-26 DIAGNOSIS — K92.2 UPPER GI BLEED: ICD-10-CM

## 2021-10-26 DIAGNOSIS — N28.9 RENAL INSUFFICIENCY: ICD-10-CM

## 2021-10-26 DIAGNOSIS — D64.9 ANEMIA, UNSPECIFIED TYPE: ICD-10-CM

## 2021-10-26 DIAGNOSIS — K92.0 HEMATEMESIS WITH NAUSEA: Primary | ICD-10-CM

## 2021-10-26 DIAGNOSIS — R73.9 HYPERGLYCEMIA: ICD-10-CM

## 2021-10-26 DIAGNOSIS — E87.5 HYPERKALEMIA: ICD-10-CM

## 2021-10-26 LAB
ABO/RH: NORMAL
ALBUMIN SERPL-MCNC: 2.3 G/DL (ref 3.5–5.2)
ALBUMIN SERPL-MCNC: 2.6 G/DL (ref 3.5–5.2)
ALP BLD-CCNC: 110 U/L (ref 40–129)
ALP BLD-CCNC: 123 U/L (ref 40–129)
ALT SERPL-CCNC: 22 U/L (ref 0–40)
ALT SERPL-CCNC: 29 U/L (ref 0–40)
ANION GAP SERPL CALCULATED.3IONS-SCNC: 13 MMOL/L (ref 7–16)
ANION GAP SERPL CALCULATED.3IONS-SCNC: 17 MMOL/L (ref 7–16)
ANISOCYTOSIS: ABNORMAL
ANTIBODY SCREEN: NORMAL
APTT: 23.5 SEC (ref 24.5–35.1)
AST SERPL-CCNC: 41 U/L (ref 0–39)
AST SERPL-CCNC: 67 U/L (ref 0–39)
BASOPHILS ABSOLUTE: 0 E9/L (ref 0–0.2)
BASOPHILS RELATIVE PERCENT: 0 % (ref 0–2)
BETA-HYDROXYBUTYRATE: 0.35 MMOL/L (ref 0.02–0.27)
BILIRUB SERPL-MCNC: 0.4 MG/DL (ref 0–1.2)
BILIRUB SERPL-MCNC: 0.6 MG/DL (ref 0–1.2)
BUN BLDV-MCNC: 21 MG/DL (ref 6–23)
BUN BLDV-MCNC: 21 MG/DL (ref 6–23)
BURR CELLS: ABNORMAL
CALCIUM SERPL-MCNC: 7.6 MG/DL (ref 8.6–10.2)
CALCIUM SERPL-MCNC: 8.5 MG/DL (ref 8.6–10.2)
CHLORIDE BLD-SCNC: 103 MMOL/L (ref 98–107)
CHLORIDE BLD-SCNC: 107 MMOL/L (ref 98–107)
CO2: 18 MMOL/L (ref 22–29)
CO2: 19 MMOL/L (ref 22–29)
CREAT SERPL-MCNC: 1.3 MG/DL (ref 0.7–1.2)
CREAT SERPL-MCNC: 1.4 MG/DL (ref 0.7–1.2)
EOSINOPHILS ABSOLUTE: 0 E9/L (ref 0.05–0.5)
EOSINOPHILS RELATIVE PERCENT: 0 % (ref 0–6)
GFR AFRICAN AMERICAN: >60
GFR AFRICAN AMERICAN: >60
GFR NON-AFRICAN AMERICAN: 50 ML/MIN/1.73
GFR NON-AFRICAN AMERICAN: 55 ML/MIN/1.73
GLUCOSE BLD-MCNC: 238 MG/DL (ref 74–99)
GLUCOSE BLD-MCNC: 300 MG/DL (ref 74–99)
HCT VFR BLD CALC: 28 % (ref 37–54)
HEMOGLOBIN: 8.7 G/DL (ref 12.5–16.5)
INR BLD: 1
LACTIC ACID: 11 MMOL/L (ref 0.5–2.2)
LACTIC ACID: 7.3 MMOL/L (ref 0.5–2.2)
LIPASE: 86 U/L (ref 13–60)
LYMPHOCYTES ABSOLUTE: 0.36 E9/L (ref 1.5–4)
LYMPHOCYTES RELATIVE PERCENT: 4 % (ref 20–42)
MAGNESIUM: 1.7 MG/DL (ref 1.6–2.6)
MCH RBC QN AUTO: 34.4 PG (ref 26–35)
MCHC RBC AUTO-ENTMCNC: 31.1 % (ref 32–34.5)
MCV RBC AUTO: 110.7 FL (ref 80–99.9)
MONOCYTES ABSOLUTE: 1 E9/L (ref 0.1–0.95)
MONOCYTES RELATIVE PERCENT: 11 % (ref 2–12)
NEUTROPHILS ABSOLUTE: 7.74 E9/L (ref 1.8–7.3)
NEUTROPHILS RELATIVE PERCENT: 85 % (ref 43–80)
OVALOCYTES: ABNORMAL
PDW BLD-RTO: 20.7 FL (ref 11.5–15)
PH VENOUS: 7.21 (ref 7.35–7.45)
PLATELET # BLD: 198 E9/L (ref 130–450)
PMV BLD AUTO: 10.7 FL (ref 7–12)
POIKILOCYTES: ABNORMAL
POLYCHROMASIA: ABNORMAL
POTASSIUM SERPL-SCNC: 5.3 MMOL/L (ref 3.5–5)
POTASSIUM SERPL-SCNC: 6.9 MMOL/L (ref 3.5–5)
PROTHROMBIN TIME: 11.8 SEC (ref 9.3–12.4)
RBC # BLD: 2.53 E12/L (ref 3.8–5.8)
REASON FOR REJECTION: NORMAL
REJECTED TEST: NORMAL
SODIUM BLD-SCNC: 138 MMOL/L (ref 132–146)
SODIUM BLD-SCNC: 139 MMOL/L (ref 132–146)
TOTAL PROTEIN: 5.4 G/DL (ref 6.4–8.3)
TOTAL PROTEIN: 6.3 G/DL (ref 6.4–8.3)
TROPONIN, HIGH SENSITIVITY: 23 NG/L (ref 0–11)
TROPONIN, HIGH SENSITIVITY: 25 NG/L (ref 0–11)
WBC # BLD: 9.1 E9/L (ref 4.5–11.5)

## 2021-10-26 PROCEDURE — 1200000000 HC SEMI PRIVATE

## 2021-10-26 PROCEDURE — 96374 THER/PROPH/DIAG INJ IV PUSH: CPT

## 2021-10-26 PROCEDURE — 86850 RBC ANTIBODY SCREEN: CPT

## 2021-10-26 PROCEDURE — 85730 THROMBOPLASTIN TIME PARTIAL: CPT

## 2021-10-26 PROCEDURE — 2580000003 HC RX 258: Performed by: EMERGENCY MEDICINE

## 2021-10-26 PROCEDURE — 82800 BLOOD PH: CPT

## 2021-10-26 PROCEDURE — 6370000000 HC RX 637 (ALT 250 FOR IP): Performed by: EMERGENCY MEDICINE

## 2021-10-26 PROCEDURE — 96375 TX/PRO/DX INJ NEW DRUG ADDON: CPT

## 2021-10-26 PROCEDURE — 84484 ASSAY OF TROPONIN QUANT: CPT

## 2021-10-26 PROCEDURE — 85025 COMPLETE CBC W/AUTO DIFF WBC: CPT

## 2021-10-26 PROCEDURE — 82010 KETONE BODYS QUAN: CPT

## 2021-10-26 PROCEDURE — 6360000002 HC RX W HCPCS: Performed by: EMERGENCY MEDICINE

## 2021-10-26 PROCEDURE — 2500000003 HC RX 250 WO HCPCS: Performed by: EMERGENCY MEDICINE

## 2021-10-26 PROCEDURE — P9016 RBC LEUKOCYTES REDUCED: HCPCS

## 2021-10-26 PROCEDURE — 86900 BLOOD TYPING SEROLOGIC ABO: CPT

## 2021-10-26 PROCEDURE — 99284 EMERGENCY DEPT VISIT MOD MDM: CPT

## 2021-10-26 PROCEDURE — 83605 ASSAY OF LACTIC ACID: CPT

## 2021-10-26 PROCEDURE — 86901 BLOOD TYPING SEROLOGIC RH(D): CPT

## 2021-10-26 PROCEDURE — C9113 INJ PANTOPRAZOLE SODIUM, VIA: HCPCS | Performed by: EMERGENCY MEDICINE

## 2021-10-26 PROCEDURE — 80053 COMPREHEN METABOLIC PANEL: CPT

## 2021-10-26 PROCEDURE — 71045 X-RAY EXAM CHEST 1 VIEW: CPT

## 2021-10-26 PROCEDURE — 6360000002 HC RX W HCPCS: Performed by: INTERNAL MEDICINE

## 2021-10-26 PROCEDURE — 85610 PROTHROMBIN TIME: CPT

## 2021-10-26 PROCEDURE — 86923 COMPATIBILITY TEST ELECTRIC: CPT

## 2021-10-26 PROCEDURE — 96365 THER/PROPH/DIAG IV INF INIT: CPT

## 2021-10-26 PROCEDURE — 6360000004 HC RX CONTRAST MEDICATION: Performed by: RADIOLOGY

## 2021-10-26 PROCEDURE — 83690 ASSAY OF LIPASE: CPT

## 2021-10-26 PROCEDURE — 93005 ELECTROCARDIOGRAM TRACING: CPT | Performed by: EMERGENCY MEDICINE

## 2021-10-26 PROCEDURE — 83735 ASSAY OF MAGNESIUM: CPT

## 2021-10-26 PROCEDURE — 2580000003 HC RX 258: Performed by: INTERNAL MEDICINE

## 2021-10-26 PROCEDURE — 74177 CT ABD & PELVIS W/CONTRAST: CPT

## 2021-10-26 PROCEDURE — 36415 COLL VENOUS BLD VENIPUNCTURE: CPT

## 2021-10-26 RX ORDER — SODIUM CHLORIDE 9 MG/ML
INJECTION, SOLUTION INTRAVENOUS CONTINUOUS
Status: DISCONTINUED | OUTPATIENT
Start: 2021-10-26 | End: 2021-10-29

## 2021-10-26 RX ORDER — 0.9 % SODIUM CHLORIDE 0.9 %
1000 INTRAVENOUS SOLUTION INTRAVENOUS ONCE
Status: COMPLETED | OUTPATIENT
Start: 2021-10-26 | End: 2021-10-26

## 2021-10-26 RX ORDER — OCTREOTIDE ACETATE 50 UG/ML
50 INJECTION, SOLUTION INTRAVENOUS; SUBCUTANEOUS ONCE
Status: COMPLETED | OUTPATIENT
Start: 2021-10-26 | End: 2021-10-26

## 2021-10-26 RX ORDER — ONDANSETRON 2 MG/ML
4 INJECTION INTRAMUSCULAR; INTRAVENOUS ONCE
Status: COMPLETED | OUTPATIENT
Start: 2021-10-26 | End: 2021-10-26

## 2021-10-26 RX ADMIN — SODIUM CHLORIDE 80 MG: 9 INJECTION, SOLUTION INTRAVENOUS at 19:55

## 2021-10-26 RX ADMIN — OCTREOTIDE ACETATE 50 MCG: 50 INJECTION, SOLUTION INTRAVENOUS; SUBCUTANEOUS at 22:43

## 2021-10-26 RX ADMIN — METRONIDAZOLE 500 MG: 500 INJECTION, SOLUTION INTRAVENOUS at 22:21

## 2021-10-26 RX ADMIN — IOPAMIDOL 80 ML: 755 INJECTION, SOLUTION INTRAVENOUS at 21:05

## 2021-10-26 RX ADMIN — INSULIN HUMAN 6 UNITS: 100 INJECTION, SOLUTION PARENTERAL at 21:55

## 2021-10-26 RX ADMIN — SODIUM CHLORIDE 1000 ML: 9 INJECTION, SOLUTION INTRAVENOUS at 21:47

## 2021-10-26 RX ADMIN — WATER 2000 MG: 1 INJECTION INTRAMUSCULAR; INTRAVENOUS; SUBCUTANEOUS at 22:21

## 2021-10-26 RX ADMIN — SODIUM CHLORIDE 1000 ML: 9 INJECTION, SOLUTION INTRAVENOUS at 20:38

## 2021-10-26 RX ADMIN — OCTREOTIDE ACETATE 25 MCG/HR: 500 INJECTION, SOLUTION INTRAVENOUS; SUBCUTANEOUS at 23:46

## 2021-10-26 RX ADMIN — SODIUM CHLORIDE: 9 INJECTION, SOLUTION INTRAVENOUS at 23:45

## 2021-10-26 RX ADMIN — SODIUM CHLORIDE 1000 ML: 9 INJECTION, SOLUTION INTRAVENOUS at 19:56

## 2021-10-26 RX ADMIN — ONDANSETRON 4 MG: 2 INJECTION INTRAMUSCULAR; INTRAVENOUS at 19:56

## 2021-10-26 ASSESSMENT — ENCOUNTER SYMPTOMS
ABDOMINAL PAIN: 1
BACK PAIN: 0
SHORTNESS OF BREATH: 0
WHEEZING: 0
SORE THROAT: 0
VOMITING: 1
CHEST TIGHTNESS: 0
DIARRHEA: 0
COUGH: 0
NAUSEA: 1

## 2021-10-26 ASSESSMENT — PAIN DESCRIPTION - LOCATION: LOCATION: ABDOMEN

## 2021-10-26 ASSESSMENT — PAIN SCALES - GENERAL
PAINLEVEL_OUTOF10: 6
PAINLEVEL_OUTOF10: 0

## 2021-10-26 ASSESSMENT — PAIN DESCRIPTION - PAIN TYPE: TYPE: ACUTE PAIN

## 2021-10-26 NOTE — ED PROVIDER NOTES
Chief complaint:  Vomiting blood    HPI history provided by the patient  Patient presents here stating that he started about 2 hours ago with nausea and vomiting with some general upper abdominal pain and threw up blood a few times. He states he actually had some blood in the emesis the first time he threw up. He does state he has had some heartburn and upper abdominal aching or cramping for the last several days prior to this onset. No chest pain, palpitations or shortness of breath. Does take aspirin and Plavix at home regularly. No other abdominal pain. No shortness of breath. No lightheadedness or syncope. No extremity numbness, tingling, paresthesias or weakness. Follows with hematology for chronic anemia although does not typically require transfusions. No treatment for his symptoms at home prior to coming in. Nothing makes it better or worse. Review of Systems   Constitutional: Negative for chills, diaphoresis, fatigue and fever. HENT: Negative for congestion, nosebleeds and sore throat. Respiratory: Negative for cough, chest tightness, shortness of breath and wheezing. Cardiovascular: Negative for chest pain, palpitations and leg swelling. Gastrointestinal: Positive for abdominal pain, nausea and vomiting. Negative for diarrhea. Genitourinary: Negative for dysuria, flank pain, frequency and urgency. Musculoskeletal: Negative for arthralgias, back pain, gait problem, joint swelling, myalgias, neck pain and neck stiffness. Skin: Negative for rash and wound. Neurological: Negative for dizziness, seizures, syncope, weakness, light-headedness, numbness and headaches. Hematological: Negative for adenopathy. All other systems reviewed and are negative. Physical Exam  Vitals and nursing note reviewed. Constitutional:       General: He is not in acute distress. Appearance: He is well-developed. He is not ill-appearing, toxic-appearing or diaphoretic.    HENT:      Head: Normocephalic and atraumatic. Comments: No sign of acute head or face injuries. Oropharynx clear with dried crusted blood on the lips and scattered throughout the tongue and oral pharynx. No active or new blood. No nasal bleeding to the bilateral nares in the nose. Eyes:      General: No scleral icterus. Pupils: Pupils are equal, round, and reactive to light. Cardiovascular:      Rate and Rhythm: Normal rate and regular rhythm. Heart sounds: Normal heart sounds. No murmur heard. Pulmonary:      Effort: Pulmonary effort is normal. No respiratory distress. Breath sounds: Normal breath sounds. No stridor, decreased air movement or transmitted upper airway sounds. No decreased breath sounds, wheezing, rhonchi or rales. Abdominal:      General: Bowel sounds are normal. There is no distension. Palpations: Abdomen is soft. Tenderness: There is abdominal tenderness in the epigastric area and left upper quadrant. There is no right CVA tenderness, left CVA tenderness, guarding or rebound. Comments: Mild epigastric and left upper quadrant tenderness on palpation. The rest of the abdomen is soft and nontender. No gross distention. No jaundice or icterus. Musculoskeletal:         General: No swelling, tenderness, deformity or signs of injury. Cervical back: Normal range of motion and neck supple. No signs of trauma or rigidity. No pain with movement, spinous process tenderness or muscular tenderness. Normal range of motion. Right lower leg: No edema. Left lower leg: No edema. Skin:     General: Skin is warm and dry. Coloration: Skin is pale. Skin is not cyanotic, jaundiced or mottled. Findings: No erythema or rash. Comments: Patient with a generally mildly pallorous appearance to his skin. Neurological:      General: No focal deficit present. Mental Status: He is alert and oriented to person, place, and time.       GCS: GCS eye subscore is 4. GCS verbal subscore is 5. GCS motor subscore is 6. Cranial Nerves: Cranial nerves are intact. No cranial nerve deficit. Coordination: Coordination normal.          Procedures     MDM     ED Course as of Oct 26 2223   Tue Oct 26, 2021   2217 Patient laying the bed resting comfortably no distress. Systolic blood pressure 443, heart rate in the 80s. No further vomiting of blood in the ER. Updated on work-up results, wife at bedside. He is comfortable being admitted. His exam is pretty much unchanged. [NC]   5517 Case discussed with Dr. Amber Cordero for Dr. Guera Dorado, they will admit the patient. They will consult surgery as needed, pending repeat lactic acid and further evaluation. [NC]      ED Course User Index  [NC] Gil Mcleod DO     EKG Interpretation    Interpreted by emergency department physician    Rhythm: normal sinus   Rate: 87  Axis: normal  Ectopy: none  Conduction: normal  ST Segments: normal  T Waves: normal  Q Waves: none    Clinical Impression: no acute changes    Gil Mcleod DO     ED Course as of Oct 26 2223   Tue Oct 26, 2021   2217 Patient laying the bed resting comfortably no distress. Systolic blood pressure 546, heart rate in the 80s. No further vomiting of blood in the ER. Updated on work-up results, wife at bedside. He is comfortable being admitted. His exam is pretty much unchanged. [NC]   8329 Case discussed with Dr. Amber Cordero for Dr. Guera Dorado, they will admit the patient. They will consult surgery as needed, pending repeat lactic acid and further evaluation. [NC]      ED Course User Index  [NC] Dayna Asher DO       --------------------------------------------- PAST HISTORY ---------------------------------------------  Past Medical History:  has a past medical history of Diabetes mellitus ([facility] [address] Hepatitis C antibody test positive, Hyperlipidemia, Hypertension, and MI, old.     Past Surgical History:  has a past surgical history that includes 10.2 mg/dL    Total Protein 6.3 (L) 6.4 - 8.3 g/dL    Albumin 2.6 (L) 3.5 - 5.2 g/dL    Total Bilirubin 0.6 0.0 - 1.2 mg/dL    Alkaline Phosphatase 123 40 - 129 U/L    ALT 29 0 - 40 U/L    AST 67 (H) 0 - 39 U/L   Protime-INR   Result Value Ref Range    Protime 11.8 9.3 - 12.4 sec    INR 1.0    APTT   Result Value Ref Range    aPTT 23.5 (L) 24.5 - 35.1 sec   Magnesium   Result Value Ref Range    Magnesium 1.7 1.6 - 2.6 mg/dL   Lactic Acid, Plasma   Result Value Ref Range    Lactic Acid 11.0 (HH) 0.5 - 2.2 mmol/L   Lipase   Result Value Ref Range    Lipase 86 (H) 13 - 60 U/L   Beta-Hydroxybutyrate   Result Value Ref Range    Beta-Hydroxybutyrate 0.35 (H) 0.02 - 0.27 mmol/L   pH, venous   Result Value Ref Range    pH, Donovan 7.21 (L) 7.35 - 7.45   SPECIMEN REJECTION   Result Value Ref Range    Rejected Test trp5     Reason for Rejection see below    COMPREHENSIVE METABOLIC PANEL   Result Value Ref Range    Sodium 139 132 - 146 mmol/L    Potassium 5.3 (H) 3.5 - 5.0 mmol/L    Chloride 107 98 - 107 mmol/L    CO2 19 (L) 22 - 29 mmol/L    Anion Gap 13 7 - 16 mmol/L    Glucose 238 (H) 74 - 99 mg/dL    BUN 21 6 - 23 mg/dL    CREATININE 1.3 (H) 0.7 - 1.2 mg/dL    GFR Non-African American 55 >=60 mL/min/1.73    GFR African American >60     Calcium 7.6 (L) 8.6 - 10.2 mg/dL    Total Protein 5.4 (L) 6.4 - 8.3 g/dL    Albumin 2.3 (L) 3.5 - 5.2 g/dL    Total Bilirubin 0.4 0.0 - 1.2 mg/dL    Alkaline Phosphatase 110 40 - 129 U/L    ALT 22 0 - 40 U/L    AST 41 (H) 0 - 39 U/L   Troponin   Result Value Ref Range    Troponin, High Sensitivity 25 (H) 0 - 11 ng/L   EKG 12 Lead   Result Value Ref Range    Ventricular Rate 87 BPM    Atrial Rate 87 BPM    P-R Interval 140 ms    QRS Duration 74 ms    Q-T Interval 388 ms    QTc Calculation (Bazett) 466 ms    P Axis 2 degrees    R Axis 48 degrees    T Axis 52 degrees   TYPE AND SCREEN   Result Value Ref Range    ABO/Rh A POS     Antibody Screen NEG        Radiology  CT ABDOMEN PELVIS W IV CONTRAST Additional Contrast? None   Final Result   Mildly lobulated liver with mild hypertrophy of the left lobe which may   represent cirrhosis. Small volume perihepatic fluid. 4.7 cm left adrenal mass with macroscopic fat characteristic for myelolipoma. Given the size of the lesion, surgical consultation is recommended. Consider   biochemical lab evaluation for functional status and pheochromocytoma prior   to resection. Enlarged and tortuous portosystemic collaterals and prominent Kassandra gastric   varices likely reflecting portal hypertension. Partially contracted gallbladder with questionable mild wall thickening and   pericholecystic stranding. Consider further evaluation with gallbladder   ultrasound. Sigmoid diverticulosis with mild pericolonic stranding along the left pelvic   sidewall with trace loculated fluid in the left side of the pelvis which may   reflect sequela from acute diverticulitis. No free intraperitoneal air. Clinical correlation recommended. Mild thickening of the urinary bladder wall and perivesical stranding which   may reflect sequela from cystitis or chronic outlet obstruction. XR CHEST PORTABLE   Final Result   No acute abnormality identified.                 ------------------------- NURSING NOTES AND VITALS REVIEWED ---------------------------  Date / Time Roomed:  10/26/2021  7:12 PM  ED Bed Assignment:  02/02    The nursing notes within the ED encounter and vital signs as below have been reviewed. Patient Vitals for the past 24 hrs:   BP Temp Temp src Pulse Resp SpO2   10/26/21 1914 107/62 97.7 °F (36.5 °C) Oral 88 18 98 %       Oxygen Saturation Interpretation: Normal      ---------------------------------------    I have spoken with the patient and discussed todays results, in addition to providing specific details for the plan of care and counseling regarding the diagnosis and prognosis.   Their questions are answered at this time and they are agreeable with the plan.      --------------------------------- ADDITIONAL PROVIDER NOTES ---------------------------------    This patient's ED course included: a personal history and physicial examination, re-evaluation prior to disposition, multiple bedside re-evaluations, IV medications, cardiac monitoring and continuous pulse oximetry    This patient has remained hemodynamically stable, improved and been closely monitored during their ED course. Please note that the withdrawal or failure to initiate urgent interventions for this patient would likely result in a life threatening deterioration or permanent disability. Accordingly this patient received 30 minutes of critical care time, excluding separately billable procedures. Systems at risk for deterioration include: cardiovascular. Clinical Impression  1. Hematemesis with nausea    2. Upper GI bleed    3. Anemia, unspecified type    4. Hyperkalemia    5. Renal insufficiency    6. Hyperglycemia          Disposition  Patient's disposition: Admit to telemetry  Patient's condition is stable.        Taylro Alex DO  10/26/21 222

## 2021-10-26 NOTE — ED NOTES
Bed: 02  Expected date:   Expected time:   Means of arrival:   Comments:  ALEJANDRO Rodgers RN  10/26/21 1912 ice

## 2021-10-27 ENCOUNTER — ANESTHESIA EVENT (OUTPATIENT)
Dept: ENDOSCOPY | Age: 71
DRG: 299 | End: 2021-10-27
Payer: MEDICARE

## 2021-10-27 ENCOUNTER — ANESTHESIA (OUTPATIENT)
Dept: ENDOSCOPY | Age: 71
DRG: 299 | End: 2021-10-27
Payer: MEDICARE

## 2021-10-27 ENCOUNTER — APPOINTMENT (OUTPATIENT)
Dept: GENERAL RADIOLOGY | Age: 71
DRG: 299 | End: 2021-10-27
Payer: MEDICARE

## 2021-10-27 ENCOUNTER — APPOINTMENT (OUTPATIENT)
Dept: ULTRASOUND IMAGING | Age: 71
DRG: 299 | End: 2021-10-27
Payer: MEDICARE

## 2021-10-27 VITALS — OXYGEN SATURATION: 100 % | DIASTOLIC BLOOD PRESSURE: 64 MMHG | SYSTOLIC BLOOD PRESSURE: 102 MMHG

## 2021-10-27 LAB
ACANTHOCYTES: ABNORMAL
ALBUMIN SERPL-MCNC: 2.7 G/DL (ref 3.5–5.2)
ALP BLD-CCNC: 108 U/L (ref 40–129)
ALT SERPL-CCNC: 24 U/L (ref 0–40)
ANION GAP SERPL CALCULATED.3IONS-SCNC: 12 MMOL/L (ref 7–16)
ANION GAP SERPL CALCULATED.3IONS-SCNC: 13 MMOL/L (ref 7–16)
ANISOCYTOSIS: ABNORMAL
AST SERPL-CCNC: 40 U/L (ref 0–39)
BACTERIA: ABNORMAL /HPF
BASOPHILS ABSOLUTE: 0.07 E9/L (ref 0–0.2)
BASOPHILS RELATIVE PERCENT: 0.9 % (ref 0–2)
BILIRUB SERPL-MCNC: 0.4 MG/DL (ref 0–1.2)
BILIRUBIN URINE: NEGATIVE
BLOOD BANK DISPENSE STATUS: NORMAL
BLOOD BANK PRODUCT CODE: NORMAL
BLOOD, URINE: ABNORMAL
BPU ID: NORMAL
BUN BLDV-MCNC: 22 MG/DL (ref 6–23)
BUN BLDV-MCNC: 25 MG/DL (ref 6–23)
BURR CELLS: ABNORMAL
CALCIUM SERPL-MCNC: 7.3 MG/DL (ref 8.6–10.2)
CALCIUM SERPL-MCNC: 7.8 MG/DL (ref 8.6–10.2)
CHLORIDE BLD-SCNC: 109 MMOL/L (ref 98–107)
CHLORIDE BLD-SCNC: 111 MMOL/L (ref 98–107)
CLARITY: CLEAR
CO2: 18 MMOL/L (ref 22–29)
CO2: 22 MMOL/L (ref 22–29)
COLOR: YELLOW
CREAT SERPL-MCNC: 1.2 MG/DL (ref 0.7–1.2)
CREAT SERPL-MCNC: 1.3 MG/DL (ref 0.7–1.2)
D DIMER: 880 NG/ML DDU
DESCRIPTION BLOOD BANK: NORMAL
EKG ATRIAL RATE: 87 BPM
EKG P AXIS: 2 DEGREES
EKG P-R INTERVAL: 140 MS
EKG Q-T INTERVAL: 388 MS
EKG QRS DURATION: 74 MS
EKG QTC CALCULATION (BAZETT): 466 MS
EKG R AXIS: 48 DEGREES
EKG T AXIS: 52 DEGREES
EKG VENTRICULAR RATE: 87 BPM
EOSINOPHILS ABSOLUTE: 0.07 E9/L (ref 0.05–0.5)
EOSINOPHILS RELATIVE PERCENT: 0.9 % (ref 0–6)
EPITHELIAL CELLS, UA: ABNORMAL /HPF
FERRITIN: 39 NG/ML
FOLATE: 15.1 NG/ML (ref 4.8–24.2)
GFR AFRICAN AMERICAN: >60
GFR AFRICAN AMERICAN: >60
GFR NON-AFRICAN AMERICAN: 55 ML/MIN/1.73
GFR NON-AFRICAN AMERICAN: 60 ML/MIN/1.73
GLUCOSE BLD-MCNC: 131 MG/DL (ref 74–99)
GLUCOSE BLD-MCNC: 145 MG/DL (ref 74–99)
GLUCOSE URINE: NEGATIVE MG/DL
HBA1C MFR BLD: 4.8 % (ref 4–5.6)
HCT VFR BLD CALC: 22.1 % (ref 37–54)
HCT VFR BLD CALC: 22.6 % (ref 37–54)
HCT VFR BLD CALC: 22.6 % (ref 37–54)
HCT VFR BLD CALC: 25.4 % (ref 37–54)
HCT VFR BLD CALC: 26.4 % (ref 37–54)
HEMOGLOBIN: 6.8 G/DL (ref 12.5–16.5)
HEMOGLOBIN: 7.1 G/DL (ref 12.5–16.5)
HEMOGLOBIN: 7.3 G/DL (ref 12.5–16.5)
HEMOGLOBIN: 8 G/DL (ref 12.5–16.5)
HEMOGLOBIN: 8.5 G/DL (ref 12.5–16.5)
HYPOCHROMIA: ABNORMAL
IMMATURE RETIC FRACT: 37.2 % (ref 2.3–13.4)
IRON SATURATION: 31 % (ref 20–55)
IRON: 72 MCG/DL (ref 59–158)
KETONES, URINE: NEGATIVE MG/DL
LACTIC ACID: 2.4 MMOL/L (ref 0.5–2.2)
LACTIC ACID: 2.6 MMOL/L (ref 0.5–2.2)
LACTIC ACID: 5.3 MMOL/L (ref 0.5–2.2)
LACTIC ACID: 6 MMOL/L (ref 0.5–2.2)
LEUKOCYTE ESTERASE, URINE: ABNORMAL
LYMPHOCYTES ABSOLUTE: 0.88 E9/L (ref 1.5–4)
LYMPHOCYTES RELATIVE PERCENT: 11.3 % (ref 20–42)
MAGNESIUM: 1.5 MG/DL (ref 1.6–2.6)
MCH RBC QN AUTO: 35.6 PG (ref 26–35)
MCHC RBC AUTO-ENTMCNC: 32.3 % (ref 32–34.5)
MCV RBC AUTO: 110.2 FL (ref 80–99.9)
METER GLUCOSE: 122 MG/DL (ref 74–99)
METER GLUCOSE: 135 MG/DL (ref 74–99)
METER GLUCOSE: 139 MG/DL (ref 74–99)
METER GLUCOSE: 149 MG/DL (ref 74–99)
METER GLUCOSE: 167 MG/DL (ref 74–99)
METER GLUCOSE: 188 MG/DL (ref 74–99)
MONOCYTES ABSOLUTE: 0.64 E9/L (ref 0.1–0.95)
MONOCYTES RELATIVE PERCENT: 7.8 % (ref 2–12)
NEUTROPHILS ABSOLUTE: 6.32 E9/L (ref 1.8–7.3)
NEUTROPHILS RELATIVE PERCENT: 79.1 % (ref 43–80)
NITRITE, URINE: POSITIVE
NUCLEATED RED BLOOD CELLS: 0.9 /100 WBC
OVALOCYTES: ABNORMAL
PDW BLD-RTO: 20.9 FL (ref 11.5–15)
PH UA: 5.5 (ref 5–9)
PHOSPHORUS: 2.5 MG/DL (ref 2.5–4.5)
PLATELET # BLD: 183 E9/L (ref 130–450)
PMV BLD AUTO: 10.7 FL (ref 7–12)
POIKILOCYTES: ABNORMAL
POLYCHROMASIA: ABNORMAL
POTASSIUM SERPL-SCNC: 5.5 MMOL/L (ref 3.5–5)
POTASSIUM SERPL-SCNC: 6.2 MMOL/L (ref 3.5–5)
PRO-BNP: 255 PG/ML (ref 0–125)
PROCALCITONIN: 0.78 NG/ML (ref 0–0.08)
PROTEIN UA: ABNORMAL MG/DL
RBC # BLD: 2.05 E12/L (ref 3.8–5.8)
RBC UA: ABNORMAL /HPF (ref 0–2)
RETIC HGB EQUIVALENT: 35.2 PG (ref 28.2–36.6)
RETICULOCYTE ABSOLUTE COUNT: 0.07 E12/L
RETICULOCYTE COUNT PCT: 3.3 % (ref 0.4–1.9)
SARS-COV-2, NAAT: NOT DETECTED
SODIUM BLD-SCNC: 140 MMOL/L (ref 132–146)
SODIUM BLD-SCNC: 145 MMOL/L (ref 132–146)
SPECIFIC GRAVITY UA: 1.02 (ref 1–1.03)
T4 FREE: 0.95 NG/DL (ref 0.93–1.7)
TOTAL IRON BINDING CAPACITY: 232 MCG/DL (ref 250–450)
TOTAL PROTEIN: 6.1 G/DL (ref 6.4–8.3)
TSH SERPL DL<=0.05 MIU/L-ACNC: 0.81 UIU/ML (ref 0.27–4.2)
UROBILINOGEN, URINE: 0.2 E.U./DL
VITAMIN B-12: 1220 PG/ML (ref 211–946)
WBC # BLD: 8 E9/L (ref 4.5–11.5)
WBC UA: ABNORMAL /HPF (ref 0–5)

## 2021-10-27 PROCEDURE — 99221 1ST HOSP IP/OBS SF/LOW 40: CPT | Performed by: SURGERY

## 2021-10-27 PROCEDURE — 2580000003 HC RX 258: Performed by: INTERNAL MEDICINE

## 2021-10-27 PROCEDURE — 87635 SARS-COV-2 COVID-19 AMP PRB: CPT

## 2021-10-27 PROCEDURE — 7100000011 HC PHASE II RECOVERY - ADDTL 15 MIN: Performed by: INTERNAL MEDICINE

## 2021-10-27 PROCEDURE — 87040 BLOOD CULTURE FOR BACTERIA: CPT

## 2021-10-27 PROCEDURE — 82024 ASSAY OF ACTH: CPT

## 2021-10-27 PROCEDURE — 84439 ASSAY OF FREE THYROXINE: CPT

## 2021-10-27 PROCEDURE — C9113 INJ PANTOPRAZOLE SODIUM, VIA: HCPCS | Performed by: INTERNAL MEDICINE

## 2021-10-27 PROCEDURE — 7100000010 HC PHASE II RECOVERY - FIRST 15 MIN: Performed by: INTERNAL MEDICINE

## 2021-10-27 PROCEDURE — 85014 HEMATOCRIT: CPT

## 2021-10-27 PROCEDURE — 3700000001 HC ADD 15 MINUTES (ANESTHESIA): Performed by: INTERNAL MEDICINE

## 2021-10-27 PROCEDURE — 1200000000 HC SEMI PRIVATE

## 2021-10-27 PROCEDURE — 36415 COLL VENOUS BLD VENIPUNCTURE: CPT

## 2021-10-27 PROCEDURE — 83735 ASSAY OF MAGNESIUM: CPT

## 2021-10-27 PROCEDURE — 82728 ASSAY OF FERRITIN: CPT

## 2021-10-27 PROCEDURE — 3609017100 HC EGD: Performed by: INTERNAL MEDICINE

## 2021-10-27 PROCEDURE — 82530 CORTISOL FREE: CPT

## 2021-10-27 PROCEDURE — 3700000000 HC ANESTHESIA ATTENDED CARE: Performed by: INTERNAL MEDICINE

## 2021-10-27 PROCEDURE — 84585 ASSAY OF URINE VMA: CPT

## 2021-10-27 PROCEDURE — 2500000003 HC RX 250 WO HCPCS: Performed by: INTERNAL MEDICINE

## 2021-10-27 PROCEDURE — 84244 ASSAY OF RENIN: CPT

## 2021-10-27 PROCEDURE — 85378 FIBRIN DEGRADE SEMIQUANT: CPT

## 2021-10-27 PROCEDURE — 6370000000 HC RX 637 (ALT 250 FOR IP): Performed by: INTERNAL MEDICINE

## 2021-10-27 PROCEDURE — 6360000002 HC RX W HCPCS: Performed by: NURSE ANESTHETIST, CERTIFIED REGISTERED

## 2021-10-27 PROCEDURE — 83540 ASSAY OF IRON: CPT

## 2021-10-27 PROCEDURE — 94640 AIRWAY INHALATION TREATMENT: CPT

## 2021-10-27 PROCEDURE — 82088 ASSAY OF ALDOSTERONE: CPT

## 2021-10-27 PROCEDURE — 83550 IRON BINDING TEST: CPT

## 2021-10-27 PROCEDURE — 81001 URINALYSIS AUTO W/SCOPE: CPT

## 2021-10-27 PROCEDURE — 82746 ASSAY OF FOLIC ACID SERUM: CPT

## 2021-10-27 PROCEDURE — 6360000002 HC RX W HCPCS: Performed by: INTERNAL MEDICINE

## 2021-10-27 PROCEDURE — 80053 COMPREHEN METABOLIC PANEL: CPT

## 2021-10-27 PROCEDURE — 84443 ASSAY THYROID STIM HORMONE: CPT

## 2021-10-27 PROCEDURE — 76705 ECHO EXAM OF ABDOMEN: CPT

## 2021-10-27 PROCEDURE — 83605 ASSAY OF LACTIC ACID: CPT

## 2021-10-27 PROCEDURE — 0DJ08ZZ INSPECTION OF UPPER INTESTINAL TRACT, VIA NATURAL OR ARTIFICIAL OPENING ENDOSCOPIC: ICD-10-PCS | Performed by: INTERNAL MEDICINE

## 2021-10-27 PROCEDURE — 84145 PROCALCITONIN (PCT): CPT

## 2021-10-27 PROCEDURE — 85025 COMPLETE CBC W/AUTO DIFF WBC: CPT

## 2021-10-27 PROCEDURE — 2709999900 HC NON-CHARGEABLE SUPPLY: Performed by: INTERNAL MEDICINE

## 2021-10-27 PROCEDURE — 83880 ASSAY OF NATRIURETIC PEPTIDE: CPT

## 2021-10-27 PROCEDURE — 71045 X-RAY EXAM CHEST 1 VIEW: CPT

## 2021-10-27 PROCEDURE — 83036 HEMOGLOBIN GLYCOSYLATED A1C: CPT

## 2021-10-27 PROCEDURE — 85018 HEMOGLOBIN: CPT

## 2021-10-27 PROCEDURE — 80048 BASIC METABOLIC PNL TOTAL CA: CPT

## 2021-10-27 PROCEDURE — 85045 AUTOMATED RETICULOCYTE COUNT: CPT

## 2021-10-27 PROCEDURE — 83835 ASSAY OF METANEPHRINES: CPT

## 2021-10-27 PROCEDURE — 82962 GLUCOSE BLOOD TEST: CPT

## 2021-10-27 PROCEDURE — 82607 VITAMIN B-12: CPT

## 2021-10-27 PROCEDURE — 84100 ASSAY OF PHOSPHORUS: CPT

## 2021-10-27 PROCEDURE — 82384 ASSAY THREE CATECHOLAMINES: CPT

## 2021-10-27 PROCEDURE — 36430 TRANSFUSION BLD/BLD COMPNT: CPT

## 2021-10-27 RX ORDER — DEXTROSE MONOHYDRATE 50 MG/ML
100 INJECTION, SOLUTION INTRAVENOUS PRN
Status: DISCONTINUED | OUTPATIENT
Start: 2021-10-27 | End: 2021-11-02 | Stop reason: HOSPADM

## 2021-10-27 RX ORDER — DEXTROSE MONOHYDRATE 25 G/50ML
12.5 INJECTION, SOLUTION INTRAVENOUS PRN
Status: DISCONTINUED | OUTPATIENT
Start: 2021-10-27 | End: 2021-10-27 | Stop reason: SDUPTHER

## 2021-10-27 RX ORDER — SODIUM CHLORIDE 0.9 % (FLUSH) 0.9 %
5-40 SYRINGE (ML) INJECTION EVERY 12 HOURS SCHEDULED
Status: DISCONTINUED | OUTPATIENT
Start: 2021-10-27 | End: 2021-11-02 | Stop reason: HOSPADM

## 2021-10-27 RX ORDER — PROCHLORPERAZINE EDISYLATE 5 MG/ML
10 INJECTION INTRAMUSCULAR; INTRAVENOUS EVERY 6 HOURS PRN
Status: DISCONTINUED | OUTPATIENT
Start: 2021-10-27 | End: 2021-11-02 | Stop reason: HOSPADM

## 2021-10-27 RX ORDER — MECOBALAMIN 5000 MCG
5 TABLET,DISINTEGRATING ORAL NIGHTLY
Status: DISCONTINUED | OUTPATIENT
Start: 2021-10-27 | End: 2021-11-02 | Stop reason: HOSPADM

## 2021-10-27 RX ORDER — SODIUM CHLORIDE 9 MG/ML
250 INJECTION, SOLUTION INTRAVENOUS PRN
Status: COMPLETED | OUTPATIENT
Start: 2021-10-27 | End: 2021-10-27

## 2021-10-27 RX ORDER — SODIUM CHLORIDE 0.9 % (FLUSH) 0.9 %
5-40 SYRINGE (ML) INJECTION PRN
Status: DISCONTINUED | OUTPATIENT
Start: 2021-10-27 | End: 2021-11-02 | Stop reason: HOSPADM

## 2021-10-27 RX ORDER — METOPROLOL SUCCINATE 100 MG/1
100 TABLET, EXTENDED RELEASE ORAL DAILY
Status: DISCONTINUED | OUTPATIENT
Start: 2021-10-27 | End: 2021-10-29

## 2021-10-27 RX ORDER — DEXTROSE MONOHYDRATE 25 G/50ML
12.5 INJECTION, SOLUTION INTRAVENOUS PRN
Status: DISCONTINUED | OUTPATIENT
Start: 2021-10-27 | End: 2021-11-02 | Stop reason: HOSPADM

## 2021-10-27 RX ORDER — IPRATROPIUM BROMIDE AND ALBUTEROL SULFATE 2.5; .5 MG/3ML; MG/3ML
1 SOLUTION RESPIRATORY (INHALATION)
Status: DISCONTINUED | OUTPATIENT
Start: 2021-10-27 | End: 2021-11-02 | Stop reason: HOSPADM

## 2021-10-27 RX ORDER — AZATHIOPRINE 50 MG/1
50 TABLET ORAL DAILY
Status: DISCONTINUED | OUTPATIENT
Start: 2021-10-27 | End: 2021-10-28 | Stop reason: DRUGHIGH

## 2021-10-27 RX ORDER — NICOTINE POLACRILEX 4 MG
15 LOZENGE BUCCAL PRN
Status: DISCONTINUED | OUTPATIENT
Start: 2021-10-27 | End: 2021-11-02 | Stop reason: HOSPADM

## 2021-10-27 RX ORDER — PANTOPRAZOLE SODIUM 40 MG/10ML
40 INJECTION, POWDER, LYOPHILIZED, FOR SOLUTION INTRAVENOUS 2 TIMES DAILY
Status: DISCONTINUED | OUTPATIENT
Start: 2021-10-27 | End: 2021-10-28

## 2021-10-27 RX ORDER — ATORVASTATIN CALCIUM 10 MG/1
10 TABLET, FILM COATED ORAL NIGHTLY
Status: DISCONTINUED | OUTPATIENT
Start: 2021-10-27 | End: 2021-11-02 | Stop reason: HOSPADM

## 2021-10-27 RX ORDER — DEXTROSE MONOHYDRATE 25 G/50ML
25 INJECTION, SOLUTION INTRAVENOUS ONCE
Status: COMPLETED | OUTPATIENT
Start: 2021-10-27 | End: 2021-10-27

## 2021-10-27 RX ORDER — PROPOFOL 10 MG/ML
INJECTION, EMULSION INTRAVENOUS PRN
Status: DISCONTINUED | OUTPATIENT
Start: 2021-10-27 | End: 2021-10-27 | Stop reason: SDUPTHER

## 2021-10-27 RX ORDER — SODIUM CHLORIDE 9 MG/ML
25 INJECTION, SOLUTION INTRAVENOUS PRN
Status: DISCONTINUED | OUTPATIENT
Start: 2021-10-27 | End: 2021-11-02 | Stop reason: HOSPADM

## 2021-10-27 RX ORDER — POLYETHYLENE GLYCOL 3350 17 G/17G
17 POWDER, FOR SOLUTION ORAL DAILY PRN
Status: DISCONTINUED | OUTPATIENT
Start: 2021-10-27 | End: 2021-11-02 | Stop reason: HOSPADM

## 2021-10-27 RX ORDER — DEXTROSE MONOHYDRATE 50 MG/ML
100 INJECTION, SOLUTION INTRAVENOUS PRN
Status: DISCONTINUED | OUTPATIENT
Start: 2021-10-27 | End: 2021-10-27 | Stop reason: SDUPTHER

## 2021-10-27 RX ORDER — PANTOPRAZOLE SODIUM 40 MG/10ML
40 INJECTION, POWDER, LYOPHILIZED, FOR SOLUTION INTRAVENOUS DAILY
Status: DISCONTINUED | OUTPATIENT
Start: 2021-10-27 | End: 2021-10-27

## 2021-10-27 RX ADMIN — SODIUM BICARBONATE 50 MEQ: 84 INJECTION, SOLUTION INTRAVENOUS at 02:12

## 2021-10-27 RX ADMIN — ATORVASTATIN CALCIUM 10 MG: 10 TABLET, FILM COATED ORAL at 20:54

## 2021-10-27 RX ADMIN — METRONIDAZOLE 500 MG: 500 INJECTION, SOLUTION INTRAVENOUS at 06:07

## 2021-10-27 RX ADMIN — WATER 1000 MG: 1 INJECTION INTRAMUSCULAR; INTRAVENOUS; SUBCUTANEOUS at 22:46

## 2021-10-27 RX ADMIN — METRONIDAZOLE 500 MG: 500 INJECTION, SOLUTION INTRAVENOUS at 22:46

## 2021-10-27 RX ADMIN — INSULIN HUMAN 10 UNITS: 100 INJECTION, SOLUTION PARENTERAL at 02:11

## 2021-10-27 RX ADMIN — SODIUM ZIRCONIUM CYCLOSILICATE 5 G: 5 POWDER, FOR SUSPENSION ORAL at 13:26

## 2021-10-27 RX ADMIN — INSULIN LISPRO 1 UNITS: 100 INJECTION, SOLUTION INTRAVENOUS; SUBCUTANEOUS at 03:48

## 2021-10-27 RX ADMIN — SODIUM CHLORIDE: 9 INJECTION, SOLUTION INTRAVENOUS at 14:06

## 2021-10-27 RX ADMIN — PROCHLORPERAZINE EDISYLATE 10 MG: 5 INJECTION INTRAMUSCULAR; INTRAVENOUS at 01:56

## 2021-10-27 RX ADMIN — OCTREOTIDE ACETATE 25 MCG/HR: 500 INJECTION, SOLUTION INTRAVENOUS; SUBCUTANEOUS at 14:31

## 2021-10-27 RX ADMIN — AZATHIOPRINE 50 MG: 50 TABLET ORAL at 13:26

## 2021-10-27 RX ADMIN — ATORVASTATIN CALCIUM 10 MG: 10 TABLET, FILM COATED ORAL at 00:31

## 2021-10-27 RX ADMIN — PROPOFOL 200 MG: 10 INJECTION, EMULSION INTRAVENOUS at 15:31

## 2021-10-27 RX ADMIN — SODIUM CHLORIDE: 9 INJECTION, SOLUTION INTRAVENOUS at 15:28

## 2021-10-27 RX ADMIN — IPRATROPIUM BROMIDE AND ALBUTEROL SULFATE 1 AMPULE: .5; 2.5 SOLUTION RESPIRATORY (INHALATION) at 19:10

## 2021-10-27 RX ADMIN — PANTOPRAZOLE SODIUM 40 MG: 40 INJECTION, POWDER, FOR SOLUTION INTRAVENOUS at 21:01

## 2021-10-27 RX ADMIN — DEXTROSE MONOHYDRATE 25 G: 25 INJECTION, SOLUTION INTRAVENOUS at 02:12

## 2021-10-27 RX ADMIN — SODIUM CHLORIDE, PRESERVATIVE FREE 10 ML: 5 INJECTION INTRAVENOUS at 09:02

## 2021-10-27 RX ADMIN — SODIUM CHLORIDE 250 ML: 9 INJECTION, SOLUTION INTRAVENOUS at 13:44

## 2021-10-27 RX ADMIN — METRONIDAZOLE 500 MG: 500 INJECTION, SOLUTION INTRAVENOUS at 17:11

## 2021-10-27 RX ADMIN — PANTOPRAZOLE SODIUM 40 MG: 40 INJECTION, POWDER, FOR SOLUTION INTRAVENOUS at 09:11

## 2021-10-27 RX ADMIN — SODIUM CHLORIDE 8 MG/HR: 9 INJECTION, SOLUTION INTRAVENOUS at 18:55

## 2021-10-27 ASSESSMENT — PAIN SCALES - GENERAL
PAINLEVEL_OUTOF10: 0

## 2021-10-27 ASSESSMENT — PAIN DESCRIPTION - PROGRESSION: CLINICAL_PROGRESSION: RESOLVED

## 2021-10-27 NOTE — PLAN OF CARE
Problem: Falls - Risk of:  Goal: Will remain free from falls  10/27/2021 1802 by Checo Rodriguez RN  Outcome: Met This Shift     Problem: Falls - Risk of:  Goal: Absence of physical injury  Outcome: Met This Shift     Problem:  Bowel/Gastric:  Goal: Occurrences of nausea will decrease  10/27/2021 1802 by Checo Rodriguez RN  Outcome: Met This Shift     Problem: Pain:  Goal: Pain level will decrease  Outcome: Met This Shift     Problem: Pain:  Goal: Control of acute pain  Outcome: Met This Shift     Problem: Pain:  Goal: Control of chronic pain  Outcome: Met This Shift     Problem: Cardiac:  Goal: Ability to maintain adequate ventilation will improve  Outcome: Met This Shift     Problem: Gas Exchange - Impaired:  Goal: Levels of oxygenation will improve  Outcome: Met This Shift     Problem: Cardiac:  Goal: Ability to maintain an adequate cardiac output will improve  Outcome: Ongoing     Problem: Cardiac:  Goal: Ability to achieve and maintain adequate cardiopulmonary perfusion will improve  Outcome: Ongoing     Problem: Coping:  Goal: Ability to adjust to condition or change in health will improve  Outcome: Ongoing     Problem: Coping:  Goal: Communication of feelings regarding changes in body function or appearance will improve  Outcome: Ongoing

## 2021-10-27 NOTE — PROGRESS NOTES
Department of Internal Medicine  pn    PCP: Kat Mendoza DO  Admitting Physician: Dr. Analy Chirinos  Consultants: Dr. Ramesh Garcia and Dr. Jayjay Kumari  Date of Service: 10/26/2021    CHIEF COMPLAINT:  Hematemesis     HISTORY OF PRESENT ILLNESS:    Patient is a 42-year-old male who presented to the ED due to bout of hematemesis. Patient also complains of heartburn. Patient does admit to melena/blood in stools. Patient had upper endoscopy and colonoscopy earlier this year as well as capsule endoscopy. Patient states currently heartburn is resolved. He does admit to suprapubic abdominal discomfort. He denies any fever or chills. He does admit to shortness of breath with exertion. He denies any chest pain. 10/27/2021  Patient seen and examined on telemetry floor. Patient's wife is at the bedside and case discussed. Patient still having problems with hematemesis and hematochezia. Patient denies chest pain has some vague abdominal discomfort. Patient has occasional mild nausea. He denies any unusual shortness of breath. Patient does complain of his Teran catheter. Patient set up for EGD today. PAST MEDICAL Hx:  Past Medical History:   Diagnosis Date    Diabetes mellitus ([facility] [address]     Hepatitis C antibody test positive     Hyperlipidemia     Hypertension     MI, old        PAST SURGICAL Hx:   Past Surgical History:   Procedure Laterality Date    COLONOSCOPY  2020    CORONARY ANGIOPLASTY WITH STENT PLACEMENT      UPPER GASTROINTESTINAL ENDOSCOPY N/A 1/19/2020    EGD BIOPSY performed by Mika Kirkpatrick DO at [address] Hx:  History reviewed. No pertinent family history. HOME MEDICATIONS:  Prior to Admission medications    Medication Sig Start Date End Date Taking?  Authorizing Provider   cyanocobalamin ([employer] VITAMIN B12) 1000 MCG tablet Take 1 tablet by mouth daily 9/17/21  Yes Luz Hassan MD   pyridoxine (RA VITAMIN B-6) 50 MG tablet Take 1 tablet by mouth daily 9/17/21  Yes Ciaran COLES Sidra Oneil MD   folic acid (FOLVITE) 1 MG tablet Take 1 tablet by mouth daily 9/17/21  Yes Bonifacio Guerrero MD   ferrous sulfate (IRON 325) 325 (65 Fe) MG tablet Take 1 tablet by mouth 2 times daily 9/17/21  Yes Bonifacio Guerrero MD   aspirin 81 MG chewable tablet Take 81 mg by mouth daily   Yes Historical Provider, MD   glipiZIDE (GLUCOTROL) 10 MG tablet Take 10 mg by mouth daily    Yes Historical Provider, MD   amLODIPine (NORVASC) 10 MG tablet Take 10 mg by mouth daily   Yes Historical Provider, MD   enalapril (VASOTEC) 5 MG tablet Take 5 mg by mouth daily   Yes Historical Provider, MD   metoprolol succinate (TOPROL XL) 100 MG extended release tablet Take 100 mg by mouth daily   Yes Historical Provider, MD   melatonin 5 MG TABS tablet Take 5 mg by mouth nightly   Yes Historical Provider, MD   Multiple Vitamin (MULTI-VITAMIN DAILY PO) Take 1 tablet by mouth daily    Yes Historical Provider, MD   Coenzyme Q10 (COQ-10) 100 MG CAPS Take 100 mg by mouth nightly    Yes Historical Provider, MD   metformin (GLUCOPHAGE) 500 MG tablet Take 1,000 mg by mouth 2 times daily (with meals)    Yes Historical Provider, MD   omeprazole (PRILOSEC) 20 MG capsule Take 20 mg by mouth daily. Yes Historical Provider, MD   azaTHIOprine (IMURAN) 50 MG tablet Take 50 mg by mouth daily. Yes Historical Provider, MD   clopidogrel (PLAVIX) 75 MG tablet Take 75 mg by mouth daily.      Yes Historical Provider, MD   atorvastatin (LIPITOR) 10 MG tablet Take 10 mg by mouth nightly    Yes Historical Provider, MD       ALLERGIES:  Codeine    SOCIAL Hx:  Social History     Socioeconomic History    Marital status:      Spouse name: Not on file    Number of children: Not on file    Years of education: Not on file    Highest education level: Not on file   Occupational History    Not on file   Tobacco Use    Smoking status: Former Smoker     Packs/day: 1.00     Quit date: 2006     Years since quitting: 15.8    Smokeless tobacco: Never pain    Neurology:    Denies any headache or focal neurological deficits. No weakness or paresthesia. Derm:    Denies any rashes, ulcers, or excoriations. Denies bruising. Extremities:   Denies any lower extremity swelling or edema. PHYSICAL EXAM: Abnormal findings noted  VITALS:  Vitals:    10/27/21 1116   BP: (!) 122/58   Pulse: 97   Resp: 16   Temp: 98.3 °F (36.8 °C)   SpO2: 92%         CONSTITUTIONAL:    Awake, alert, cooperative, no apparent distress, and appears stated age    EYES:     EOMI, sclera clear, conjunctiva normal    ENT:    Normocephalic, atraumatic,  External ears without lesions. NECK:    Supple, symmetrical, trachea midline, , no JVD    HEMATOLOGIC/LYMPHATICS:    No cervical lymphadenopathy and no supraclavicular lymphadenopathy    LUNGS:    Symmetric. No increased work of breathing, good air exchange, clear to auscultation bilaterally, no wheezes, rhonchi, or rales,     CARDIOVASCULAR:    Normal apical impulse, regular rate and rhythm, normal S1 and S2, no S3 or S4, and no murmur noted    ABDOMEN:     soft, non-distended, non-tender    MUSCULOSKELETAL:    There is no redness, warmth, or swelling of the joints. NEUROLOGIC:    Awake, alert, oriented to name, place and time. SKIN:    No bruising or bleeding. No redness, warmth, or swelling    EXTREMITIES:    Peripheral pulses present. No edema, cyanosis, or swelling.     LINES/CATHETERS     LABORATORY DATA:  CBC with Differential:    Lab Results   Component Value Date    WBC 8.0 10/27/2021    RBC 2.05 10/27/2021    HGB 6.8 10/27/2021    HCT 22.1 10/27/2021     10/27/2021    .2 10/27/2021    MCH 35.6 10/27/2021    MCHC 32.3 10/27/2021    RDW 20.9 10/27/2021    NRBC 0.9 10/27/2021    SEGSPCT 69 08/25/2013    BANDSPCT 2 03/24/2016    METASPCT 0.9 09/16/2021    LYMPHOPCT 11.3 10/27/2021    MONOPCT 7.8 10/27/2021    BASOPCT 0.9 10/27/2021    MONOSABS 0.64 10/27/2021    LYMPHSABS 0.88 10/27/2021    EOSABS 0.07 10/27/2021    BASOSABS 0.07 10/27/2021     CMP:    Lab Results   Component Value Date     10/27/2021    K 5.5 10/27/2021    K 4.6 01/17/2020     10/27/2021    CO2 22 10/27/2021    BUN 25 10/27/2021    CREATININE 1.3 10/27/2021    GFRAA >60 10/27/2021    LABGLOM 55 10/27/2021    GLUCOSE 145 10/27/2021    GLUCOSE 136 07/31/2011    PROT 6.1 10/27/2021    LABALBU 2.7 10/27/2021    LABALBU 4.3 07/31/2011    CALCIUM 7.8 10/27/2021    BILITOT 0.4 10/27/2021    ALKPHOS 108 10/27/2021    AST 40 10/27/2021    ALT 24 10/27/2021       ASSESSMENT/PLAN:  1. Acute on chronic anemia secondary to GI bleed  2. Acute hypoxic respiratory failure  3. Hyperkalemia  4. Lactic acidosis  5. CT findings suggestive of hepatic cirrhosis  6. Left adrenal mass  7. Possible cholecystitis without right-sided upper quadrant abdominal pain  8. Sigmoid diverticulosis with mild pericolonic stranding with trace loculated fluid in left pelvis  9. Cystitis versus chronic outlet obstruction  10. History of autoimmune hepatitis  11. Coronary artery disease with stents in place  12. Hypertension  13. Hyperlipidemia  14. Non-insulin-dependent diabetes mellitus-hemoglobin A1c 4.8  15. History of tobacco abuse    Patient presented with abdominal pain and hematemesis. Patient found to have a drop in H&H. Patient started on Protonix drip. Patient placed on Protonix twice daily. Patient patient started on Rocephin and Flagyl in the setting of CT findings. General surgery consulted. GI consulted. Patient will be transfused 1 unit PRBC. On initial examination patient was not hypoxic. However he developed hypoxia. Home medications reviewed  Case discussed with general surgery and GI. Glucoscans 4 times daily with sliding scale insulin  CMP, CBC in a.m.   EGD franchesca Guaman DO  12:34 PM  10/27/2021

## 2021-10-27 NOTE — ANESTHESIA POSTPROCEDURE EVALUATION
Department of Anesthesiology  Postprocedure Note    Patient: Nikita Prince  MRN: [id number]  YOB: 1950  Date of evaluation: 10/27/2021  Time:  4:39 PM     Procedure Summary     Date: 10/27/21 Room / Location: [address] [place] [address] 01 / [facility]    Anesthesia Start: 7803 Anesthesia Stop: 6718    Procedure: EGD ESOPHAGOGASTRODUODENOSCOPY (N/A ) Diagnosis: (HEMATEMESIS)    Surgeons: Isadora Lam MD Responsible Provider: Cortney Dobson MD    Anesthesia Type: MAC ASA Status: 3          Anesthesia Type: MAC    Kathy Phase I:      Kathy Phase II: Kathy Score: 9    Last vitals: Reviewed and per EMR flowsheets.        Anesthesia Post Evaluation    Patient location during evaluation: PACU  Patient participation: complete - patient participated  Level of consciousness: awake  Airway patency: patent  Nausea & Vomiting: no nausea and no vomiting  Complications: no  Cardiovascular status: hemodynamically stable  Respiratory status: acceptable  Hydration status: euvolemic

## 2021-10-27 NOTE — OP NOTE
Operative Note      Patient: Melissa Story  YOB: 1950  MRN: [id number]    Date of Procedure: 10/27/2021    Pre-Op Diagnosis: HEMATEMESIS    Post-Op Diagnosis: Esophageal and gastric varices       Procedure(s):  EGD ESOPHAGOGASTRODUODENOSCOPY    Surgeon:  Jesenia Yates    Assistant:   Surgical Assistant: Ronald Kapoor RN  Fellow: Aston Cohen DO; Javier Barr DO    Anesthesia: Monitor Anesthesia Care    Estimated Blood Loss (mL): Minimal    Complications: None    Specimens:   * No specimens in log *    Implants:  * No implants in log *      Drains:   [REMOVED] Urethral Catheter (Removed)   Catheter Indications Need for fluid volume management of the critically ill patient in a critical care setting 10/27/21 0259   Site Assessment No urethral drainage 10/27/21 0259   Urine Color Patricia 10/27/21 1300   Urine Appearance Red flecks 10/27/21 1300   Urine Odor Malodorous 10/27/21 0259   Output (mL) 300 mL 10/27/21 1300       Findings:    1. Large esophageal varices  2. Gastric varices with fibrin clots   3. Portal hypertensive gastropathy    Detailed Description of Procedure:     Procedure:  Esophagogastroduodenoscopy    Indication: Acute blood loss anemia    Consent:   Informed consent was obtained from the patient including and not limited to risk of perforation, aspiration of gastric contents or teeth, bleeding, infection, dental breakage, ileus, need for surgery, or worst case death. Sedation:  MAC    Estimated Blood Loss: Normal    Endoscope was advanced easily through mouth to second portion of duodenum      Oropharynx views are limited but grossly normal.    Esophagus:   Mucosa is normal with large varices. GEJ at 40 cm. Stomach:   Antrum is normal    Gastric body with portal hypertensive gastropathy    Retroflexed views show normal fundus and cardia with esophageal varices fibrin clot    Duodenum: Bulb is normal.    Second portion of duodenum is normal.    IMPRESSION AND PLAN:     1. Large esophageal varices  2. Gastric varices with fibrin clot  3. Portal hypertensive gastropathy    Patient acute blood loss anemia most likely due to gastric varices with fibrin clot. Patient will need TIPS procedure for further evaluation of portal hypertension. Initiate patient on Protonix and octreotide infusion. Continue monitor H&H and elevate bed and and advance diet to clear liquid.     Pt was seen and procedure was performed with Dr. Lili Anderson  present for the entire procedure    Electronically signed by Dell Loving DO on 10/27/2021 at 3:42 PM

## 2021-10-27 NOTE — CONSULTS
GI CONSULT NOTE    [facility]  Dr. Madhavi Huggins M.D., Dr. Venita Salinas M.D., Dr. Filiberto Panchal D.O., Dr. Roshni Schmidt M.D., Dr. Janusz Rader D.O., GI fellow      Date:11:05 AM 10/27/2021    Emily Lozada  73 y.o.  male    HPI:  53-year-old male with PMHx of diabetes mellitus, hyperlipidemia, hypertension, and CAD s/p MI and PCI who presented to the ED with worsening abdominal pain for the past week and acute episode of hematemesis that occurred several hours prior to presentation. She denies any previous history of similar symptoms. Initial H&H was 8.7/28.0 which was lower than his most recent of 11.0/33.4 on September 16, 2020. He is on Plavix for the past 8 years. He endorses diarrhea but denies any melena or hematochezia. Last colonoscopy: Normal colonoscopy on February 2020, per patient  Last EGD: Normal EGD on 1/19/2020    PAST MEDICAL Hx:  Past Medical History:   Diagnosis Date    Diabetes mellitus ([facility] [address]     Hepatitis C antibody test positive     Hyperlipidemia     Hypertension     MI, old        PAST SURGICAL Hx:   Past Surgical History:   Procedure Laterality Date    COLONOSCOPY  2020    CORONARY ANGIOPLASTY WITH STENT PLACEMENT      UPPER GASTROINTESTINAL ENDOSCOPY N/A 1/19/2020    EGD BIOPSY performed by Janusz Dey DO at [address] Hx:  History reviewed. No pertinent family history. HOME MEDICATIONS:  Prior to Admission medications    Medication Sig Start Date End Date Taking?  Authorizing Provider   cyanocobalamin ([employer] VITAMIN B12) 1000 MCG tablet Take 1 tablet by mouth daily 9/17/21  Yes Reji Staley MD   pyridoxine (RA VITAMIN B-6) 50 MG tablet Take 1 tablet by mouth daily 9/17/21  Yes Reji Staley MD   folic acid (FOLVITE) 1 MG tablet Take 1 tablet by mouth daily 9/17/21  Yes Reji Staley MD   ferrous sulfate (IRON 325) 325 (65 Fe) MG tablet Take 1 tablet by mouth 2 times daily 9/17/21  Yes Deshaun Long MD   aspirin 81 MG chewable tablet Take 81 mg by mouth daily   Yes Historical Provider, MD   glipiZIDE (GLUCOTROL) 10 MG tablet Take 10 mg by mouth daily    Yes Historical Provider, MD   amLODIPine (NORVASC) 10 MG tablet Take 10 mg by mouth daily   Yes Historical Provider, MD   enalapril (VASOTEC) 5 MG tablet Take 5 mg by mouth daily   Yes Historical Provider, MD   metoprolol succinate (TOPROL XL) 100 MG extended release tablet Take 100 mg by mouth daily   Yes Historical Provider, MD   melatonin 5 MG TABS tablet Take 5 mg by mouth nightly   Yes Historical Provider, MD   Multiple Vitamin (MULTI-VITAMIN DAILY PO) Take 1 tablet by mouth daily    Yes Historical Provider, MD   Coenzyme Q10 (COQ-10) 100 MG CAPS Take 100 mg by mouth nightly    Yes Historical Provider, MD   metformin (GLUCOPHAGE) 500 MG tablet Take 1,000 mg by mouth 2 times daily (with meals)    Yes Historical Provider, MD   omeprazole (PRILOSEC) 20 MG capsule Take 20 mg by mouth daily. Yes Historical Provider, MD   azaTHIOprine (IMURAN) 50 MG tablet Take 50 mg by mouth daily. Yes Historical Provider, MD   clopidogrel (PLAVIX) 75 MG tablet Take 75 mg by mouth daily.      Yes Historical Provider, MD   atorvastatin (LIPITOR) 10 MG tablet Take 10 mg by mouth nightly    Yes Historical Provider, MD       ALLERGIES:  Codeine    SOCIAL Hx:  Social History     Socioeconomic History    Marital status:      Spouse name: Not on file    Number of children: Not on file    Years of education: Not on file    Highest education level: Not on file   Occupational History    Not on file   Tobacco Use    Smoking status: Former Smoker     Packs/day: 1.00     Quit date: 2006     Years since quitting: 15.8    Smokeless tobacco: Never Used   Substance and Sexual Activity    Alcohol use: Yes     Comment: occasional    Drug use: Not Currently     Types: Marijuana    Sexual activity: Yes     Partners: Female   Other Topics Concern    Not on file   Social History Narrative    Not on file     Social Determinants of Health     Financial Resource Strain:     Difficulty of Paying Living Expenses:    Food Insecurity:     Worried About Running Out of Food in the Last Year:     [address] in the Last Year:    Transportation Needs:     Lack of Transportation (Medical):  Lack of Transportation (Non-Medical):    Physical Activity:     Days of Exercise per Week:     Minutes of Exercise per Session:    Stress:     Feeling of Stress :    Social Connections:     Frequency of Communication with Friends and Family:     Frequency of Social Gatherings with Friends and Family:     Attends [employer] Services:     Active Member of Clubs or Organizations:     Attends Club or Organization Meetings:     Marital Status:    Intimate Partner Violence:     Fear of Current or Ex-Partner:     Emotionally Abused:     Physically Abused:     Sexually Abused:        PE:  BP (!) 130/58   Pulse 99   Temp 98.8 °F (37.1 °C) (Oral)   Resp 16   SpO2 94%     General: A&Ox 3, friendly, NAD  HEENT: Atraumatic, symmetric, no anterior/posterior lymphadenopathy, moist mucous membranes, PERRL, EOM intact, fair dentition. Pulm: CTAB, Neg w/r/r, normal chest expansion, no crackles noted  Cardio: RRR, neg m/r/g, nl S1 and S2, no extra heart sounds  Abd.: soft, NT, ND, BS+, no G/R, no HSM  Ext: +2/4 pulse Dp and radial b/l, LE and UE ROM intact,   Skin: No lesions, excoriations, petechiae, or ecchymoses noted    Neuro: normal sensation throughout, DTRs patellar, tricept, and bicept 2/4 b/l and equal, normal muscle strength throughout.       DATA:     Lab Results   Component Value Date    WBC 8.0 10/27/2021    RBC 2.05 10/27/2021    HGB 7.3 10/27/2021    HCT 22.6 10/27/2021    .2 10/27/2021    MCH 35.6 10/27/2021    MCHC 32.3 10/27/2021    RDW 20.9 10/27/2021     10/27/2021    MPV 10.7 10/27/2021     Lab Results   Component Value Date     10/27/2021 K 5.5 10/27/2021    K 4.6 01/17/2020     10/27/2021    CO2 22 10/27/2021    BUN 25 10/27/2021    CREATININE 1.3 10/27/2021    CALCIUM 7.8 10/27/2021    PROT 6.1 10/27/2021    LABALBU 2.7 10/27/2021    LABALBU 4.3 07/31/2011    BILITOT 0.4 10/27/2021    ALKPHOS 108 10/27/2021    AST 40 10/27/2021    ALT 24 10/27/2021     Lab Results   Component Value Date    LIPASE 86 10/26/2021     No results found for: AMYLASE      ASSESSMENT/PLAN:  1. Acute blood loss anemia secondary to upper GI bleed  -Likely secondary to esophagitis VS gastritis VS Mia-Salas VS Dieulafoy lesion  -Patient hemodynamically stable without clinical evidence of an overt or brisk bleed  -PPI IV twice daily  -Octreotide drip  -Type and cross, please keep 2 units hold at all times. Recommend restrictive transfusion parameters, will defer to primary  -Recommend holding Plavix  -N.p.o. for EGD today  -Continue supportive care        2. Adrenal mass  -Per general surgery and urology        Other medical issues managed on this admission:  · Diabetes mellitus  · Hyperlipidemia  · Hypertension  · CAD s/p MI and PCI      Will discuss with attending     Salvador Ross DO  10/27/2021  11:05 AM    Pt seen and independently examined. Pertinent notes and lab work reviewed. D/w Dr. Magdi Hernandez with physical exam and A&P. Discussed with patient/family at bedside - all questions answered - agreeable with the plan as delineated, including plan for endoscopy as described. Thank you for the opportunity to see this patient in consultation.     Lupe Gonzalez MD  10/27/2021  11:59 AM

## 2021-10-27 NOTE — PLAN OF CARE
Problem: Falls - Risk of:  Goal: Will remain free from falls  Description: Will remain free from falls  Outcome: Met This Shift     Problem:  Bowel/Gastric:  Goal: Occurrences of nausea will decrease  Description: Occurrences of nausea will decrease  Outcome: Met This Shift

## 2021-10-27 NOTE — H&P
(NORVASC) 10 MG tablet Take 10 mg by mouth daily    Historical Provider, MD   enalapril (VASOTEC) 5 MG tablet Take 5 mg by mouth daily    Historical Provider, MD   metoprolol succinate (TOPROL XL) 100 MG extended release tablet Take 100 mg by mouth daily    Historical Provider, MD   melatonin 5 MG TABS tablet Take 5 mg by mouth nightly    Historical Provider, MD   Multiple Vitamin (MULTI-VITAMIN DAILY PO) Take 1 tablet by mouth daily     Historical Provider, MD   Coenzyme Q10 (COQ-10) 100 MG CAPS Take 100 mg by mouth nightly     Historical Provider, MD   metformin (GLUCOPHAGE) 500 MG tablet Take 1,000 mg by mouth 2 times daily (with meals)     Historical Provider, MD   omeprazole (PRILOSEC) 20 MG capsule Take 20 mg by mouth daily. Historical Provider, MD   azaTHIOprine (IMURAN) 50 MG tablet Take 50 mg by mouth daily. Historical Provider, MD   clopidogrel (PLAVIX) 75 MG tablet Take 75 mg by mouth daily.       Historical Provider, MD   atorvastatin (LIPITOR) 10 MG tablet Take 10 mg by mouth nightly     Historical Provider, MD       ALLERGIES:  Codeine    SOCIAL Hx:  Social History     Socioeconomic History    Marital status:      Spouse name: Not on file    Number of children: Not on file    Years of education: Not on file    Highest education level: Not on file   Occupational History    Not on file   Tobacco Use    Smoking status: Former Smoker     Packs/day: 1.00     Quit date: 2006     Years since quitting: 15.8    Smokeless tobacco: Never Used   Substance and Sexual Activity    Alcohol use: Yes     Comment: occasional    Drug use: Not Currently     Types: Marijuana    Sexual activity: Yes     Partners: Female   Other Topics Concern    Not on file   Social History Narrative    Not on file     Social Determinants of Health     Financial Resource Strain:     Difficulty of Paying Living Expenses:    Food Insecurity:     Worried About Running Out of Food in the Last Year:     Nicholas lees Food in the Last Year:    Transportation Needs:     Lack of Transportation (Medical):  Lack of Transportation (Non-Medical):    Physical Activity:     Days of Exercise per Week:     Minutes of Exercise per Session:    Stress:     Feeling of Stress :    Social Connections:     Frequency of Communication with Friends and Family:     Frequency of Social Gatherings with Friends and Family:     Attends Christianity Services:     Active Member of Clubs or Organizations:     Attends Club or Organization Meetings:     Marital Status:    Intimate Partner Violence:     Fear of Current or Ex-Partner:     Emotionally Abused:     Physically Abused:     Sexually Abused:        ROS: Positive in bold  General:   Denies chills, fatigue, fever, malaise, night sweats or weight loss    Psychological:   Denies anxiety, disorientation or hallucinations    ENT:    Denies epistaxis, headaches, vertigo or visual changes    Cardiovascular:   Denies any chest pain, irregular heartbeats, or palpitations. No paroxysmal nocturnal dyspnea. Respiratory:   Denies shortness of breath, coughing, sputum production, hemoptysis, or wheezing. No orthopnea. Gastrointestinal:   Denies nausea, vomiting, diarrhea, or constipation. Denies any abdominal pain. Denies change in bowel habits or stools. Genito-Urinary:    Denies any urgency, frequency, hematuria. Voiding without difficulty. Musculoskeletal:   Denies joint pain, joint stiffness, joint swelling or muscle pain    Neurology:    Denies any headache or focal neurological deficits. No weakness or paresthesia. Derm:    Denies any rashes, ulcers, or excoriations. Denies bruising. Extremities:   Denies any lower extremity swelling or edema.       PHYSICAL EXAM: Abnormal findings noted  VITALS:  Vitals:    10/26/21 1914   BP: 107/62   Pulse: 88   Resp: 18   Temp: 97.7 °F (36.5 °C)   SpO2: 98%         CONSTITUTIONAL:    Awake, alert, cooperative, no apparent distress, and appears stated age    EYES:     EOMI, sclera clear, conjunctiva normal    ENT:    Normocephalic, atraumatic,  External ears without lesions. NECK:    Supple, symmetrical, trachea midline, , no JVD    HEMATOLOGIC/LYMPHATICS:    No cervical lymphadenopathy and no supraclavicular lymphadenopathy    LUNGS:    Symmetric. No increased work of breathing, good air exchange, clear to auscultation bilaterally, no wheezes, rhonchi, or rales,     CARDIOVASCULAR:    Normal apical impulse, regular rate and rhythm, normal S1 and S2, no S3 or S4, and no murmur noted    ABDOMEN:     soft, non-distended, non-tender    MUSCULOSKELETAL:    There is no redness, warmth, or swelling of the joints. NEUROLOGIC:    Awake, alert, oriented to name, place and time. SKIN:    No bruising or bleeding. No redness, warmth, or swelling    EXTREMITIES:    Peripheral pulses present. No edema, cyanosis, or swelling.     LINES/CATHETERS     LABORATORY DATA:  CBC with Differential:    Lab Results   Component Value Date    WBC 9.1 10/26/2021    RBC 2.53 10/26/2021    HGB 8.7 10/26/2021    HCT 28.0 10/26/2021     10/26/2021    .7 10/26/2021    MCH 34.4 10/26/2021    MCHC 31.1 10/26/2021    RDW 20.7 10/26/2021    NRBC 0.9 07/29/2021    SEGSPCT 69 08/25/2013    BANDSPCT 2 03/24/2016    METASPCT 0.9 09/16/2021    LYMPHOPCT 4.0 10/26/2021    MONOPCT 11.0 10/26/2021    BASOPCT 0.0 10/26/2021    MONOSABS 1.00 10/26/2021    LYMPHSABS 0.36 10/26/2021    EOSABS 0.00 10/26/2021    BASOSABS 0.00 10/26/2021     CMP:    Lab Results   Component Value Date     10/27/2021    K 6.2 10/27/2021    K 4.6 01/17/2020     10/27/2021    CO2 18 10/27/2021    BUN 22 10/27/2021    CREATININE 1.2 10/27/2021    GFRAA >60 10/27/2021    LABGLOM 60 10/27/2021    GLUCOSE 131 10/27/2021    GLUCOSE 136 07/31/2011    PROT 5.4 10/26/2021    LABALBU 2.3 10/26/2021    LABALBU 4.3 07/31/2011    CALCIUM 7.3 10/27/2021    BILITOT 0.4 10/26/2021    ALKPHOS 110 10/26/2021    AST 41 10/26/2021    ALT 22 10/26/2021       ASSESSMENT/PLAN:  1. Acute on chronic anemia secondary to GI bleed  2. Acute hypoxic respiratory failure  3. Hyperkalemia  4. Lactic acidosis  5. CT findings suggestive of hepatic cirrhosis  6. Left adrenal mass  7. Possible cholecystitis without right-sided upper quadrant abdominal pain  8. Sigmoid diverticulosis with mild pericolonic stranding with trace loculated fluid in left pelvis  9. Cystitis versus chronic outlet obstruction  10. History of autoimmune hepatitis  11. Coronary artery disease with stents in place  12. Hypertension  13. Hyperlipidemia  14. Non-insulin-dependent diabetes mellitus  15. History of tobacco abuse    Patient presented with abdominal pain and hematemesis. Patient found to have a drop in H&H. Patient started on Protonix drip. Patient placed on Protonix twice daily. Patient patient started on Rocephin and Flagyl in the setting of CT findings. General surgery consulted. GI consulted. Patient will be transfused 1 unit PRBC. On initial examination patient was not hypoxic. However he developed hypoxia. Chest x-ray ordered. Patient will be placed on nebulized breathing treatments. Hyperkalemia will be treated. Continue to monitor.     Amalia Valle  10:19 PM  10/26/2021    Electronically signed by Debbi Lindsay DO on 10/26/21 at 10:19 PM EDT

## 2021-10-27 NOTE — CONSULTS
GENERAL SURGERY  CONSULT NOTE  10/27/2021    Physician Consulted: Dr. Aaron Cardenas  Reason for Consult: adrenal mass  Referring Physician: Dr. Marck Sawant    Chief Complaint   Patient presents with    Hematemesis     Pateint vomiting up blood. Started this evening. Abdominal Pain     Abdominal pain started this afternoon        HPI  Corey Perez is a 73 y.o. male who presents for evaluation of hematemesis. Has had some epigastric abdominal pain for a few weeks. Last night around dinner he threw up blood and clots. Has had diarrhea attributed to metformi, no bloody or tarry stools. Has never had this before. He takes prilosec daily. Denies NSAID use. 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 He had anemia workup previously with EGD, colonoscopy and pill endoscopy in January that was unrevealing. He takes plavix, no other blood thinners. Imaging in the ED showed incidental 4.7cm left adrenal mass. He was told he had a left adrenal mass 20 years ago, at that time was the size of a marble and it would be monitored. Past Medical History:   Diagnosis Date    Diabetes mellitus ([facility] [address]     Hepatitis C antibody test positive     Hyperlipidemia     Hypertension     MI, old        Past Surgical History:   Procedure Laterality Date    COLONOSCOPY  2020    CORONARY ANGIOPLASTY WITH STENT PLACEMENT      UPPER GASTROINTESTINAL ENDOSCOPY N/A 1/19/2020    EGD BIOPSY performed by Remberto Taylor DO at [facility]       Medications Prior to Admission:    Prior to Admission medications    Medication Sig Start Date End Date Taking?  Authorizing Provider   cyanocobalamin ([employer] VITAMIN B12) 1000 MCG tablet Take 1 tablet by mouth daily 9/17/21  Yes Boston Saab MD   pyridoxine (RA VITAMIN B-6) 50 MG tablet Take 1 tablet by mouth daily 9/17/21  Yes Boston Saab MD   folic acid (FOLVITE) 1 MG tablet Take 1 tablet by mouth daily 9/17/21  Yes Boston Saab MD   ferrous sulfate (IRON 325) 325 (65 Fe) MG tablet Take 1 tablet by mouth 2 times daily 9/17/21  Yes Ester Gregg MD   aspirin 81 MG chewable tablet Take 81 mg by mouth daily   Yes Historical Provider, MD   glipiZIDE (GLUCOTROL) 10 MG tablet Take 10 mg by mouth daily    Yes Historical Provider, MD   amLODIPine (NORVASC) 10 MG tablet Take 10 mg by mouth daily   Yes Historical Provider, MD   enalapril (VASOTEC) 5 MG tablet Take 5 mg by mouth daily   Yes Historical Provider, MD   metoprolol succinate (TOPROL XL) 100 MG extended release tablet Take 100 mg by mouth daily   Yes Historical Provider, MD   melatonin 5 MG TABS tablet Take 5 mg by mouth nightly   Yes Historical Provider, MD   Multiple Vitamin (MULTI-VITAMIN DAILY PO) Take 1 tablet by mouth daily    Yes Historical Provider, MD   Coenzyme Q10 (COQ-10) 100 MG CAPS Take 100 mg by mouth nightly    Yes Historical Provider, MD   metformin (GLUCOPHAGE) 500 MG tablet Take 1,000 mg by mouth 2 times daily (with meals)    Yes Historical Provider, MD   omeprazole (PRILOSEC) 20 MG capsule Take 20 mg by mouth daily. Yes Historical Provider, MD   azaTHIOprine (IMURAN) 50 MG tablet Take 50 mg by mouth daily. Yes Historical Provider, MD   clopidogrel (PLAVIX) 75 MG tablet Take 75 mg by mouth daily. Yes Historical Provider, MD   atorvastatin (LIPITOR) 10 MG tablet Take 10 mg by mouth nightly    Yes Historical Provider, MD       Allergies   Allergen Reactions    Codeine        History reviewed. No pertinent family history.     Social History     Tobacco Use    Smoking status: Former Smoker     Packs/day: 1.00     Quit date: 2006     Years since quitting: 15.8    Smokeless tobacco: Never Used   Substance Use Topics    Alcohol use: Yes     Comment: occasional    Drug use: Not Currently     Types: Marijuana         Review of Systems   General ROS: negative for - chills, fatigue, fever, hot flashes or weight loss  Hematological and Lymphatic ROS: positive for - bleeding problems  negative for - blood clots  Respiratory ROS: no cough, shortness of breath, or wheezing  Cardiovascular ROS: no chest pain or dyspnea on exertion or palpitations  Gastrointestinal ROS: as above  Genito-Urinary ROS: no dysuria, trouble voiding, or hematuria  Musculoskeletal ROS: negative for - joint pain or muscle pain      PHYSICAL EXAM:    Vitals:    10/27/21 0804   BP:    Pulse:    Resp:    Temp:    SpO2: 95%     General appearance: alert, cooperative and in no acute distress. Eyes: grossly normal  Lungs: Nonlabored breathing on room air   Heart: regular rate  Abdomen: soft, non-tender, non distended  Skin: No skin abnormalities  Neurologic: Alert and oriented x 3. Grossly normal  Musculoskeletal: No clubbing or cyanosis, trace pedal edema       LABS:  CBC  Recent Labs     10/27/21  0524   WBC 8.0   HGB 7.3*   HCT 22.6*        BMP  Recent Labs     10/27/21  0524      K 5.5*   *   CO2 22   BUN 25*   CREATININE 1.3*   CALCIUM 7.8*     Liver Function  Recent Labs     10/26/21  1940 10/26/21  2036 10/27/21  0524   LIPASE 86*  --   --    BILITOT 0.6   < > 0.4   AST 67*   < > 40*   ALT 29   < > 24   ALKPHOS 123   < > 108   PROT 6.3*   < > 6.1*   LABALBU 2.6*   < > 2.7*    < > = values in this interval not displayed. No results for input(s): LACTATE in the last 72 hours. Recent Labs     10/26/21  1940   INR 1.0       RADIOLOGY  CT ABDOMEN PELVIS W IV CONTRAST Additional Contrast? None    Result Date: 10/26/2021  EXAMINATION: CT OF THE ABDOMEN AND PELVIS WITH CONTRAST 10/26/2021 7:59 pm TECHNIQUE: CT of the abdomen and pelvis was performed with the administration of intravenous contrast. Multiplanar reformatted images are provided for review. Dose modulation, iterative reconstruction, and/or weight based adjustment of the mA/kV was utilized to reduce the radiation dose to as low as reasonably achievable. COMPARISON: None.  HISTORY: ORDERING SYSTEM PROVIDED HISTORY: uppergi bleed, hematemesis TECHNOLOGIST PROVIDED HISTORY: Additional Contrast?->None Reason for exam:->uppergi bleed, hematemesis Decision Support Exception - unselect if not a suspected or confirmed emergency medical condition->Emergency Medical Condition (MA) FINDINGS: Lower Chest: Chronic interstitial changes in the lung bases with small calcified granuloma. No evidence of airspace consolidation or pleural effusions. Organs: Mildly lobulated liver with mild hypertrophy of the left lobe suggest cirrhosis. Perihepatic fluid. Splenic granuloma. There is a heterogeneous mass arising from the left adrenal gland with foci of macroscopic fat, small calcifications and large soft tissue component. The presence of macroscopic fat is characteristic for myelolipoma. The mass measures approximately 4.7 cm in diameter. Given the size of the lesion surgical consultation is recommended. Consider biochemical lab evaluation for functional status and pheochromocytoma prior to resection. There are enlarged and tortuous portosystemic collaterals which may reflect portal hypertension. Prominent perigastric varices. Bilateral renal cysts, no obstructive uropathy. Partially contracted gallbladder with questionable mild wall thickening and pericholecystic stranding. Consider further evaluation with gallbladder ultrasound. GI/Bowel: No evidence of bowel obstruction or free intraperitoneal air. No colitis or diverticulitis. Multiple diverticula along the sigmoid colon. There is mild pericolonic stranding along the left pelvic sidewall with trace loculated fluid in the left side of the pelvis adjacent to the sigmoid colon. This may reflect sequela from acute diverticulitis. Clinical correlation recommended. Pelvis: Urinary bladder incompletely distended. Mild thickening of the urinary bladder wall and perivesical stranding which may reflect sequela from cystitis or chronic outlet obstruction. Peritoneum/Retroperitoneum: No abdominal aortic aneurysm or retroperitoneal adenopathy.  Bones/Soft Tissues: No acute bony pathology. Mildly lobulated liver with mild hypertrophy of the left lobe which may represent cirrhosis. Small volume perihepatic fluid. 4.7 cm left adrenal mass with macroscopic fat characteristic for myelolipoma. Given the size of the lesion, surgical consultation is recommended. Consider biochemical lab evaluation for functional status and pheochromocytoma prior to resection. Enlarged and tortuous portosystemic collaterals and prominent Kassandra gastric varices likely reflecting portal hypertension. Partially contracted gallbladder with questionable mild wall thickening and pericholecystic stranding. Consider further evaluation with gallbladder ultrasound. Sigmoid diverticulosis with mild pericolonic stranding along the left pelvic sidewall with trace loculated fluid in the left side of the pelvis which may reflect sequela from acute diverticulitis. No free intraperitoneal air. Clinical correlation recommended. Mild thickening of the urinary bladder wall and perivesical stranding which may reflect sequela from cystitis or chronic outlet obstruction. XR CHEST PORTABLE    Result Date: 10/27/2021  EXAMINATION: ONE XRAY VIEW OF THE CHEST 10/27/2021 6:08 am COMPARISON: 10/26/2021 HISTORY: ORDERING SYSTEM PROVIDED HISTORY: hypoxia TECHNOLOGIST PROVIDED HISTORY: Reason for exam:->hypoxia FINDINGS: The lungs are without acute focal process. There is no effusion or pneumothorax. The cardiomediastinal silhouette is without acute process. The osseous structures are without acute process. There are few scattered punctate benign less than 5 mm calcified granulomas within the right and left lungs. These findings are stable. No acute process.      ASSESSMENT:  73 y.o. male with incidental adrenal mass    PLAN:  Will send labwork for incidentaloma  Will refer to Urology for further outpatient workup    Electronically signed by Cirilo Fofana MD on 10/27/21 at 10:05 AM EDT    Surgery Progress Note            Chief complaint:   Chief Complaint   Patient presents with    Hematemesis     Pateint vomiting up blood. Started this evening.      Abdominal Pain     Abdominal pain started this afternoon      Patient Active Problem List   Diagnosis    Anemia    GI bleed       S: as above    O:   Vitals:    10/27/21 0804   BP:    Pulse:    Resp:    Temp:    SpO2: 95%       Intake/Output Summary (Last 24 hours) at 10/27/2021 1014  Last data filed at 10/27/2021 1001  Gross per 24 hour   Intake 100 ml   Output 350 ml   Net -250 ml           Labs:  Lab Results   Component Value Date    WBC 8.0 10/27/2021    WBC 9.1 10/26/2021    WBC 6.5 09/16/2021    HGB 7.3 10/27/2021    HGB 7.1 10/27/2021    HGB 8.7 10/26/2021    HCT 22.6 10/27/2021    HCT 22.6 10/27/2021    HCT 28.0 10/26/2021     Lab Results   Component Value Date    CREATININE 1.3 (H) 10/27/2021    BUN 25 (H) 10/27/2021     10/27/2021    K 5.5 (H) 10/27/2021     (H) 10/27/2021    CO2 22 10/27/2021     Lab Results   Component Value Date    LIPASE 86 10/26/2021         Physical exam:   BP (!) 93/40   Pulse 99   Temp 98.9 °F (37.2 °C) (Oral)   Resp 18   SpO2 95%   General appearance: NAD  Head: NCAT  Neck: supple, no masses  Lungs: equal chest rise bilateral  Heart: S1S2 present  Abdomen: soft, nontender, nondistended  Skin; no lesions  Gu: no cva tenderness  Extremities: extremities normal, atraumatic, no cyanosis or edema    A:  adrenal mass    P: will order labs to evaluate activity if any, then outpt evaluation with urology    Mk Zamora MD, MD  10/27/2021

## 2021-10-27 NOTE — ANESTHESIA PRE PROCEDURE
Department of Anesthesiology  Preprocedure Note       Name:  Lucille Ribeiro   Age:  73 y.o.  :  1950                                          MRN:  [id number]         Date:  10/27/2021      Surgeon: Gregg Humphrey):  Angel Black MD    Procedure: Procedure(s):  EGD ESOPHAGOGASTRODUODENOSCOPY    Medications prior to admission:   Prior to Admission medications    Medication Sig Start Date End Date Taking? Authorizing Provider   cyanocobalamin ([employer] VITAMIN B12) 1000 MCG tablet Take 1 tablet by mouth daily 21  Yes Hannah Plata MD   pyridoxine (RA VITAMIN B-6) 50 MG tablet Take 1 tablet by mouth daily 21  Yes Hannah Plata MD   folic acid (FOLVITE) 1 MG tablet Take 1 tablet by mouth daily 21  Yes Hannah Plata MD   ferrous sulfate (IRON 325) 325 (65 Fe) MG tablet Take 1 tablet by mouth 2 times daily 21  Yes Hannah Plata MD   aspirin 81 MG chewable tablet Take 81 mg by mouth daily   Yes Historical Provider, MD   glipiZIDE (GLUCOTROL) 10 MG tablet Take 10 mg by mouth daily    Yes Historical Provider, MD   amLODIPine (NORVASC) 10 MG tablet Take 10 mg by mouth daily   Yes Historical Provider, MD   enalapril (VASOTEC) 5 MG tablet Take 5 mg by mouth daily   Yes Historical Provider, MD   metoprolol succinate (TOPROL XL) 100 MG extended release tablet Take 100 mg by mouth daily   Yes Historical Provider, MD   melatonin 5 MG TABS tablet Take 5 mg by mouth nightly   Yes Historical Provider, MD   Multiple Vitamin (MULTI-VITAMIN DAILY PO) Take 1 tablet by mouth daily    Yes Historical Provider, MD   Coenzyme Q10 (COQ-10) 100 MG CAPS Take 100 mg by mouth nightly    Yes Historical Provider, MD   metformin (GLUCOPHAGE) 500 MG tablet Take 1,000 mg by mouth 2 times daily (with meals)    Yes Historical Provider, MD   omeprazole (PRILOSEC) 20 MG capsule Take 20 mg by mouth daily. Yes Historical Provider, MD   azaTHIOprine (IMURAN) 50 MG tablet Take 50 mg by mouth daily.      Yes Historical Provider, MD   clopidogrel (PLAVIX) 75 MG tablet Take 75 mg by mouth daily.      Yes Historical Provider, MD   atorvastatin (LIPITOR) 10 MG tablet Take 10 mg by mouth nightly    Yes Historical Provider, MD       Current medications:    Current Facility-Administered Medications   Medication Dose Route Frequency Provider Last Rate Last Admin    atorvastatin (LIPITOR) tablet 10 mg  10 mg Oral Nightly Merlin Ocracoke, DO   10 mg at 10/27/21 0031    [Held by provider] metoprolol succinate (TOPROL XL) extended release tablet 100 mg  100 mg Oral Daily Merlin Ocracoke, DO        glucose (GLUTOSE) 40 % oral gel 15 g  15 g Oral PRN Merlin Ocracoke, DO        sodium chloride flush 0.9 % injection 5-40 mL  5-40 mL IntraVENous 2 times per day Merlin Ocracoke, DO   10 mL at 10/27/21 0902    sodium chloride flush 0.9 % injection 5-40 mL  5-40 mL IntraVENous PRN Merlin Ocracoke, DO        0.9 % sodium chloride infusion  25 mL IntraVENous PRN Merlin Ocracoke, DO        polyethylene glycol (GLYCOLAX) packet 17 g  17 g Oral Daily PRN Merlin Ocracoke, DO        insulin lispro (HUMALOG) injection vial 0-6 Units  0-6 Units SubCUTAneous Q4H Merlin Maywood, DO   1 Units at 10/27/21 0348    prochlorperazine (COMPAZINE) injection 10 mg  10 mg IntraVENous Q6H PRN Merlin Ocracoke, DO   10 mg at 10/27/21 0156    cefTRIAXone (ROCEPHIN) 1,000 mg in sterile water 10 mL IV syringe  1,000 mg IntraVENous Q24H Merlin Ocracoke, DO        metronidazole (FLAGYL) 500 mg in NaCl 100 mL IVPB premix  500 mg IntraVENous Q8H Merlin Ocracoke, DO   Stopped at 10/27/21 0707    azaTHIOprine (IMURAN) tablet 50 mg  50 mg Oral Daily Merlin Maywood, DO   50 mg at 10/27/21 1326    glucose (GLUTOSE) 40 % oral gel 15 g  15 g Oral PRN Merlin Ocracoke, DO        dextrose 50 % IV solution  12.5 g IntraVENous PRN Merlin Ocracoke, DO        glucagon (rDNA) injection 1 mg  1 mg IntraMUSCular PRN Merlin Ocracoke, DO        dextrose 5 % solution  100 mL/hr IntraVENous PRN [place], DO        pantoprazole (PROTONIX) injection 40 mg  40 mg IntraVENous BID [place], DO        melatonin disintegrating tablet 5 mg  5 mg Oral Nightly [place], DO        ipratropium-albuterol (DUONEB) nebulizer solution 1 ampule  1 ampule Inhalation Q4H While awake [place], DO        sodium zirconium cyclosilicate (LOKELMA) oral suspension 5 g  5 g Oral Daily [place], DO   5 g at 10/27/21 1326    octreotide (SANDOSTATIN) 500 mcg in sodium chloride 0.9 % 100 mL infusion  25 mcg/hr IntraVENous Continuous [place], DO 5 mL/hr at 10/27/21 1431 25 mcg/hr at 10/27/21 1431    0.9 % sodium chloride infusion   IntraVENous Continuous [place], DO 75 mL/hr at 10/27/21 1406 New Bag at 10/27/21 1406    influenza A&B vaccine (FLUAD QUADRIVALENT) injection 0.5 mL  0.5 mL IntraMUSCular Prior to discharge John Thorne DO           Allergies:     Allergies   Allergen Reactions    Codeine        Problem List:    Patient Active Problem List   Diagnosis Code    Anemia D64.9    GI bleed K92.2       Past Medical History:        Diagnosis Date    Diabetes mellitus ([facility] [address]     Hepatitis C antibody test positive     Hyperlipidemia     Hypertension     MI, old        Past Surgical History:        Procedure Laterality Date    COLONOSCOPY  2020    CORONARY ANGIOPLASTY WITH STENT PLACEMENT      UPPER GASTROINTESTINAL ENDOSCOPY N/A 1/19/2020    EGD BIOPSY performed by Aaron Croft DO at [address] History:    Social History     Tobacco Use    Smoking status: Former Smoker     Packs/day: 1.00     Quit date: 2006     Years since quitting: 15.8    Smokeless tobacco: Never Used   Substance Use Topics    Alcohol use: Yes     Comment: occasional                                Counseling given: Not Answered      Vital Signs (Current):   Vitals:    10/27/21 1030 10/27/21 1116 10/27/21 1343 10/27/21 1345   BP: (!) 130/58 (!) 122/58 (!) 119/53    Pulse: 99 97 97    Resp: 16 16 16    Temp: 98.8 °F (37.1 °C) 98.3 °F (36.8 °C) 98.5 °F (36.9 °C)    TempSrc: Oral Oral Oral    SpO2: 94% 92% (!) 87% 92%                                              BP Readings from Last 3 Encounters:   10/27/21 (!) 119/53   09/17/21 (!) 152/76   07/30/21 133/73       NPO Status:                                                                                 BMI:   Wt Readings from Last 3 Encounters:   09/17/21 194 lb 11.2 oz (88.3 kg)   07/30/21 193 lb 6.4 oz (87.7 kg)   07/02/21 195 lb 4.8 oz (88.6 kg)     There is no height or weight on file to calculate BMI.    CBC:   Lab Results   Component Value Date    WBC 8.0 10/27/2021    RBC 2.05 10/27/2021    HGB 6.8 10/27/2021    HCT 22.1 10/27/2021    .2 10/27/2021    RDW 20.9 10/27/2021     10/27/2021       CMP:   Lab Results   Component Value Date     10/27/2021    K 5.5 10/27/2021    K 4.6 01/17/2020     10/27/2021    CO2 22 10/27/2021    BUN 25 10/27/2021    CREATININE 1.3 10/27/2021    GFRAA >60 10/27/2021    LABGLOM 55 10/27/2021    GLUCOSE 145 10/27/2021    GLUCOSE 136 07/31/2011    PROT 6.1 10/27/2021    CALCIUM 7.8 10/27/2021    BILITOT 0.4 10/27/2021    ALKPHOS 108 10/27/2021    AST 40 10/27/2021    ALT 24 10/27/2021       POC Tests: No results for input(s): POCGLU, POCNA, POCK, POCCL, POCBUN, POCHEMO, POCHCT in the last 72 hours.     Coags:   Lab Results   Component Value Date    PROTIME 11.8 10/26/2021    INR 1.0 10/26/2021    APTT 23.5 10/26/2021       HCG (If Applicable): No results found for: PREGTESTUR, PREGSERUM, HCG, HCGQUANT     ABGs: No results found for: PHART, PO2ART, FJD6FJC, FYA9CQL, BEART, V6XHWAFX     Type & Screen (If Applicable):  No results found for: LABABO, LABRH    Drug/Infectious Status (If Applicable):  No results found for: HIV, HEPCAB    COVID-19 Screening (If Applicable):   Lab Results   Component Value Date    COVID19 Not Detected 10/27/2021           Anesthesia Evaluation  Patient summary reviewed no history of anesthetic complications:   Airway: Mallampati: III  TM distance: >3 FB   Neck ROM: full  Mouth opening: > = 3 FB Dental:    (+) upper dentures      Pulmonary:Negative Pulmonary ROS breath sounds clear to auscultation                            ROS comment: Former Smoker. Cardiovascular:    (+) hypertension:, past MI (Old MI.):, hyperlipidemia      ECG reviewed  Rhythm: regular  Rate: normal           Beta Blocker:  Dose within 24 Hrs      ROS comment: EKG: Normal Sinus Rhythm 57. Neuro/Psych:   Negative Neuro/Psych ROS              GI/Hepatic/Renal:   (+) hepatitis: C,           Endo/Other:    (+) DiabetesType II DM, , blood dyscrasia: anemia:., electrolyte abnormalities (hyperkalemia), . Abdominal:         (-) obese       Vascular: negative vascular ROS. Other Findings:           Anesthesia Plan      MAC     ASA 3       Induction: intravenous. Anesthetic plan and risks discussed with patient. Plan discussed with CRNA. DOS STAFF ADDENDUM:    Pt seen and examined, chart reviewed (including anesthesia, drug and allergy history). Anesthetic plan, risks, benefits, alternatives, and personnel involved discussed with patient. Patient verbalized an understanding and agrees to proceed. Plan discussed with care team members and agreed upon.     Paulina Preston MD  Staff Anesthesiologist  2:52 PM    Paulina Preston MD   10/27/2021

## 2021-10-28 LAB
ACETAMINOPHEN LEVEL: <5 MCG/ML (ref 10–30)
ALBUMIN SERPL-MCNC: 2.7 G/DL (ref 3.5–5.2)
ALP BLD-CCNC: 97 U/L (ref 40–129)
ALT SERPL-CCNC: 23 U/L (ref 0–40)
AMMONIA: 33 UMOL/L (ref 16–60)
ANION GAP SERPL CALCULATED.3IONS-SCNC: 10 MMOL/L (ref 7–16)
ANISOCYTOSIS: ABNORMAL
AST SERPL-CCNC: 42 U/L (ref 0–39)
BASOPHILS ABSOLUTE: 0.07 E9/L (ref 0–0.2)
BASOPHILS RELATIVE PERCENT: 0.9 % (ref 0–2)
BETA-HYDROXYBUTYRATE: 0.17 MMOL/L (ref 0.02–0.27)
BILIRUB SERPL-MCNC: 0.6 MG/DL (ref 0–1.2)
BUN BLDV-MCNC: 34 MG/DL (ref 6–23)
BURR CELLS: ABNORMAL
CALCIUM SERPL-MCNC: 7.7 MG/DL (ref 8.6–10.2)
CHLORIDE BLD-SCNC: 110 MMOL/L (ref 98–107)
CO2: 23 MMOL/L (ref 22–29)
CREAT SERPL-MCNC: 1.1 MG/DL (ref 0.7–1.2)
EOSINOPHILS ABSOLUTE: 0.22 E9/L (ref 0.05–0.5)
EOSINOPHILS RELATIVE PERCENT: 2.6 % (ref 0–6)
FERRITIN: 52 NG/ML
GAMMA GLUTAMYL TRANSFERASE: 260 U/L (ref 10–71)
GFR AFRICAN AMERICAN: >60
GFR NON-AFRICAN AMERICAN: >60 ML/MIN/1.73
GLUCOSE BLD-MCNC: 139 MG/DL (ref 74–99)
HCT VFR BLD CALC: 25.3 % (ref 37–54)
HCT VFR BLD CALC: 26.5 % (ref 37–54)
HCT VFR BLD CALC: 26.8 % (ref 37–54)
HEMOGLOBIN: 7.8 G/DL (ref 12.5–16.5)
HEMOGLOBIN: 8.3 G/DL (ref 12.5–16.5)
HEMOGLOBIN: 8.5 G/DL (ref 12.5–16.5)
HYPOCHROMIA: ABNORMAL
INR BLD: 1.1
IRON SATURATION: 50 % (ref 20–55)
IRON: 118 MCG/DL (ref 59–158)
LACTIC ACID: 3.1 MMOL/L (ref 0.5–2.2)
LACTIC ACID: 3.6 MMOL/L (ref 0.5–2.2)
LACTIC ACID: 5 MMOL/L (ref 0.5–2.2)
LYMPHOCYTES ABSOLUTE: 0.83 E9/L (ref 1.5–4)
LYMPHOCYTES RELATIVE PERCENT: 9.6 % (ref 20–42)
MAGNESIUM: 1.5 MG/DL (ref 1.6–2.6)
MCH RBC QN AUTO: 33.3 PG (ref 26–35)
MCHC RBC AUTO-ENTMCNC: 31.3 % (ref 32–34.5)
MCV RBC AUTO: 106.4 FL (ref 80–99.9)
METER GLUCOSE: 132 MG/DL (ref 74–99)
METER GLUCOSE: 143 MG/DL (ref 74–99)
METER GLUCOSE: 157 MG/DL (ref 74–99)
METER GLUCOSE: 196 MG/DL (ref 74–99)
METER GLUCOSE: 209 MG/DL (ref 74–99)
MONOCYTES ABSOLUTE: 0.08 E9/L (ref 0.1–0.95)
MONOCYTES RELATIVE PERCENT: 0.9 % (ref 2–12)
NEUTROPHILS ABSOLUTE: 7.14 E9/L (ref 1.8–7.3)
NEUTROPHILS RELATIVE PERCENT: 86.1 % (ref 43–80)
PDW BLD-RTO: 20.7 FL (ref 11.5–15)
PLATELET # BLD: 164 E9/L (ref 130–450)
PMV BLD AUTO: 10.6 FL (ref 7–12)
POIKILOCYTES: ABNORMAL
POLYCHROMASIA: ABNORMAL
POTASSIUM SERPL-SCNC: 4.2 MMOL/L (ref 3.5–5)
PROTHROMBIN TIME: 13.2 SEC (ref 9.3–12.4)
RBC # BLD: 2.49 E12/L (ref 3.8–5.8)
SODIUM BLD-SCNC: 143 MMOL/L (ref 132–146)
SPHEROCYTES: ABNORMAL
TOTAL IRON BINDING CAPACITY: 238 MCG/DL (ref 250–450)
TOTAL PROTEIN: 6.3 G/DL (ref 6.4–8.3)
TRANSFERRIN: 193 MG/DL (ref 200–360)
WBC # BLD: 8.3 E9/L (ref 4.5–11.5)

## 2021-10-28 PROCEDURE — 80053 COMPREHEN METABOLIC PANEL: CPT

## 2021-10-28 PROCEDURE — 83605 ASSAY OF LACTIC ACID: CPT

## 2021-10-28 PROCEDURE — 36415 COLL VENOUS BLD VENIPUNCTURE: CPT

## 2021-10-28 PROCEDURE — 83516 IMMUNOASSAY NONANTIBODY: CPT

## 2021-10-28 PROCEDURE — 94640 AIRWAY INHALATION TREATMENT: CPT

## 2021-10-28 PROCEDURE — 2580000003 HC RX 258: Performed by: INTERNAL MEDICINE

## 2021-10-28 PROCEDURE — 82140 ASSAY OF AMMONIA: CPT

## 2021-10-28 PROCEDURE — 86694 HERPES SIMPLEX NES ANTBDY: CPT

## 2021-10-28 PROCEDURE — 83540 ASSAY OF IRON: CPT

## 2021-10-28 PROCEDURE — 86704 HEP B CORE ANTIBODY TOTAL: CPT

## 2021-10-28 PROCEDURE — 86038 ANTINUCLEAR ANTIBODIES: CPT

## 2021-10-28 PROCEDURE — 2500000003 HC RX 250 WO HCPCS: Performed by: INTERNAL MEDICINE

## 2021-10-28 PROCEDURE — 80074 ACUTE HEPATITIS PANEL: CPT

## 2021-10-28 PROCEDURE — 85018 HEMOGLOBIN: CPT

## 2021-10-28 PROCEDURE — 80143 DRUG ASSAY ACETAMINOPHEN: CPT

## 2021-10-28 PROCEDURE — 82390 ASSAY OF CERULOPLASMIN: CPT

## 2021-10-28 PROCEDURE — 83915 ASSAY OF NUCLEOTIDASE: CPT

## 2021-10-28 PROCEDURE — 83735 ASSAY OF MAGNESIUM: CPT

## 2021-10-28 PROCEDURE — 86039 ANTINUCLEAR ANTIBODIES (ANA): CPT

## 2021-10-28 PROCEDURE — 85025 COMPLETE CBC W/AUTO DIFF WBC: CPT

## 2021-10-28 PROCEDURE — 83550 IRON BINDING TEST: CPT

## 2021-10-28 PROCEDURE — 6370000000 HC RX 637 (ALT 250 FOR IP): Performed by: INTERNAL MEDICINE

## 2021-10-28 PROCEDURE — 1200000000 HC SEMI PRIVATE

## 2021-10-28 PROCEDURE — 82962 GLUCOSE BLOOD TEST: CPT

## 2021-10-28 PROCEDURE — 84466 ASSAY OF TRANSFERRIN: CPT

## 2021-10-28 PROCEDURE — 82728 ASSAY OF FERRITIN: CPT

## 2021-10-28 PROCEDURE — 6360000002 HC RX W HCPCS: Performed by: INTERNAL MEDICINE

## 2021-10-28 PROCEDURE — 2700000000 HC OXYGEN THERAPY PER DAY

## 2021-10-28 PROCEDURE — 86645 CMV ANTIBODY IGM: CPT

## 2021-10-28 PROCEDURE — 82010 KETONE BODYS QUAN: CPT

## 2021-10-28 PROCEDURE — 82103 ALPHA-1-ANTITRYPSIN TOTAL: CPT

## 2021-10-28 PROCEDURE — C9113 INJ PANTOPRAZOLE SODIUM, VIA: HCPCS | Performed by: INTERNAL MEDICINE

## 2021-10-28 PROCEDURE — 86255 FLUORESCENT ANTIBODY SCREEN: CPT

## 2021-10-28 PROCEDURE — 82977 ASSAY OF GGT: CPT

## 2021-10-28 PROCEDURE — 85014 HEMATOCRIT: CPT

## 2021-10-28 PROCEDURE — 85610 PROTHROMBIN TIME: CPT

## 2021-10-28 RX ORDER — AZATHIOPRINE 50 MG/1
75 TABLET ORAL DAILY
Status: DISCONTINUED | OUTPATIENT
Start: 2021-10-29 | End: 2021-11-02 | Stop reason: HOSPADM

## 2021-10-28 RX ORDER — TRAMADOL HYDROCHLORIDE 50 MG/1
25 TABLET ORAL EVERY 6 HOURS PRN
Status: DISCONTINUED | OUTPATIENT
Start: 2021-10-28 | End: 2021-11-02 | Stop reason: HOSPADM

## 2021-10-28 RX ORDER — LACTULOSE 10 G/15ML
20 SOLUTION ORAL 3 TIMES DAILY
Status: DISCONTINUED | OUTPATIENT
Start: 2021-10-28 | End: 2021-11-01

## 2021-10-28 RX ADMIN — AZATHIOPRINE 50 MG: 50 TABLET ORAL at 09:41

## 2021-10-28 RX ADMIN — LACTULOSE 20 G: 20 SOLUTION ORAL at 17:58

## 2021-10-28 RX ADMIN — Medication 5 MG: at 20:32

## 2021-10-28 RX ADMIN — IPRATROPIUM BROMIDE AND ALBUTEROL SULFATE 1 AMPULE: .5; 2.5 SOLUTION RESPIRATORY (INHALATION) at 10:34

## 2021-10-28 RX ADMIN — INSULIN LISPRO 2 UNITS: 100 INJECTION, SOLUTION INTRAVENOUS; SUBCUTANEOUS at 20:40

## 2021-10-28 RX ADMIN — SODIUM CHLORIDE 8 MG/HR: 9 INJECTION, SOLUTION INTRAVENOUS at 21:28

## 2021-10-28 RX ADMIN — IPRATROPIUM BROMIDE AND ALBUTEROL SULFATE 1 AMPULE: .5; 2.5 SOLUTION RESPIRATORY (INHALATION) at 18:58

## 2021-10-28 RX ADMIN — RIFAXIMIN 550 MG: 550 TABLET ORAL at 11:06

## 2021-10-28 RX ADMIN — SODIUM CHLORIDE 8 MG/HR: 9 INJECTION, SOLUTION INTRAVENOUS at 09:43

## 2021-10-28 RX ADMIN — SODIUM CHLORIDE: 9 INJECTION, SOLUTION INTRAVENOUS at 19:21

## 2021-10-28 RX ADMIN — WATER 1000 MG: 1 INJECTION INTRAMUSCULAR; INTRAVENOUS; SUBCUTANEOUS at 21:31

## 2021-10-28 RX ADMIN — LACTULOSE 20 G: 20 SOLUTION ORAL at 20:32

## 2021-10-28 RX ADMIN — OCTREOTIDE ACETATE 25 MCG/HR: 500 INJECTION, SOLUTION INTRAVENOUS; SUBCUTANEOUS at 16:32

## 2021-10-28 RX ADMIN — INSULIN LISPRO 1 UNITS: 100 INJECTION, SOLUTION INTRAVENOUS; SUBCUTANEOUS at 16:33

## 2021-10-28 RX ADMIN — RIFAXIMIN 550 MG: 550 TABLET ORAL at 20:32

## 2021-10-28 RX ADMIN — IPRATROPIUM BROMIDE AND ALBUTEROL SULFATE 1 AMPULE: .5; 2.5 SOLUTION RESPIRATORY (INHALATION) at 06:28

## 2021-10-28 RX ADMIN — SODIUM ZIRCONIUM CYCLOSILICATE 5 G: 5 POWDER, FOR SUSPENSION ORAL at 11:06

## 2021-10-28 RX ADMIN — ATORVASTATIN CALCIUM 10 MG: 10 TABLET, FILM COATED ORAL at 20:32

## 2021-10-28 RX ADMIN — TRAMADOL HYDROCHLORIDE 25 MG: 50 TABLET, FILM COATED ORAL at 20:35

## 2021-10-28 RX ADMIN — METRONIDAZOLE 500 MG: 500 INJECTION, SOLUTION INTRAVENOUS at 09:41

## 2021-10-28 RX ADMIN — LACTULOSE 20 G: 20 SOLUTION ORAL at 11:06

## 2021-10-28 RX ADMIN — METRONIDAZOLE 500 MG: 500 INJECTION, SOLUTION INTRAVENOUS at 17:58

## 2021-10-28 ASSESSMENT — PAIN SCALES - GENERAL
PAINLEVEL_OUTOF10: 0
PAINLEVEL_OUTOF10: 4
PAINLEVEL_OUTOF10: 9
PAINLEVEL_OUTOF10: 0
PAINLEVEL_OUTOF10: 0

## 2021-10-28 NOTE — CARE COORDINATION
10/28/2021 1018 CM note: No covid testing. Follow up visit made to pt and wife at bedside. Pt on IV Protonix and IV Sandostatin gtts. He remains on [employer] [place] and does NOT have home o2. IF home o2 is needed, pt prefers [place]. Incentive spirometer given/reviewed. Per gi, pt may need tertiary care for TIPS procedure. Pt may benefit from PT/OT evals. CM/ SW to follow.  Rosa M CARTER

## 2021-10-28 NOTE — CONSULTS
Patient seen and examined. Consult dictated. Patient's wife is present at the bedside. Patient is a 79-year-old gentleman with hepatic dysfunction believed to be secondary to cirrhosis of the liver along with esophageal varices. Patient presented with acute kidney injury, hyperkalemia and severe lactic acidosis with a lactic acid level of 11.0 mmol per liter. Patient does have underlying history of diabetes mellitus. Initial lactic acidosis may be secondary to use of metformin in the outpatient setting. Patient now has persistent lactic acidosis which may be a reflection of hepatic insufficiency with cirrhosis and consequent compromised utilization of the lactate in the liver. Will follow serial lactic acid levels. If needed we'll supplement bicarbonate. Hypokalemia is resolved. Will discontinue Lokelma. We may need to have gentle hydration but will defer that for now. Patient's condition was discussed in detail with the patient's wife. Dr. Keaton Ramirez, Thank You for allowing me to participate in the care of this patient. Will follow the patient with you.     Steffany Betts MD  Nephrology    Electronically signed by Bartolome Schreiber MD on 10/28/2021 at 3:22 PM

## 2021-10-28 NOTE — PROGRESS NOTES
Department of Internal Medicine  pn    PCP: Erin Ramachandran DO  Admitting Physician: Dr. Myranda Cabello  Consultants: Dr. Indu Choi and Dr. Tammy Stafford  Date of Service: 10/26/2021    CHIEF COMPLAINT:  Hematemesis     HISTORY OF PRESENT ILLNESS:    Patient is a 72-year-old male who presented to the ED due to bout of hematemesis. Patient also complains of heartburn. Patient does admit to melena/blood in stools. Patient had upper endoscopy and colonoscopy earlier this year as well as capsule endoscopy. Patient states currently heartburn is resolved. He does admit to suprapubic abdominal discomfort. He denies any fever or chills. He does admit to shortness of breath with exertion. He denies any chest pain. 10/27/2021  Patient seen and examined on telemetry floor. Patient's wife is at the bedside and case discussed. Patient still having problems with hematemesis and hematochezia. Patient denies chest pain has some vague abdominal discomfort. Patient has occasional mild nausea. He denies any unusual shortness of breath. Patient does complain of his Teran catheter. Patient set up for EGD today. 10/28/2021  Patient seen examined on telemetry floor. Patient is alert oriented person place but patient is still has somewhat of a flat affect. Case discussed with patient's wife at the bedside. BUN/creatinine 34/1.1 with lactic acid ranging 3.1-5.0. Blood sugars ranging 139-188. Serum ammonia is normal at 33. Hemoglobin 8.5 temperature 98.8 with heart rate 70 and blood pressure 131/63. O2 sat 95% on 3 L nasal cannula. Urine output seems to be adequate. EGD yesterday showed large esophageal varices with gastric varices with clot.       PAST MEDICAL Hx:  Past Medical History:   Diagnosis Date    Diabetes mellitus ([facility] [address]     Hepatitis C antibody test positive     Hyperlipidemia     Hypertension     MI, old        PAST SURGICAL Hx:   Past Surgical History:   Procedure Laterality Date    COLONOSCOPY  2020    CORONARY ANGIOPLASTY WITH STENT PLACEMENT      UPPER GASTROINTESTINAL ENDOSCOPY N/A 1/19/2020    EGD BIOPSY performed by Kiran Larkin DO at [address] [facility] N/A 10/27/2021    EGD ESOPHAGOGASTRODUODENOSCOPY performed by Alejandro Dixon MD at [address] [facility] Hx:  History reviewed. No pertinent family history. HOME MEDICATIONS:  Prior to Admission medications    Medication Sig Start Date End Date Taking? Authorizing Provider   cyanocobalamin ([employer] VITAMIN B12) 1000 MCG tablet Take 1 tablet by mouth daily 9/17/21  Yes Rancho Garner MD   pyridoxine (RA VITAMIN B-6) 50 MG tablet Take 1 tablet by mouth daily 9/17/21  Yes Rancho Garner MD   folic acid (FOLVITE) 1 MG tablet Take 1 tablet by mouth daily 9/17/21  Yes Rancho Garner MD   ferrous sulfate (IRON 325) 325 (65 Fe) MG tablet Take 1 tablet by mouth 2 times daily 9/17/21  Yes Rancho Garner MD   aspirin 81 MG chewable tablet Take 81 mg by mouth daily   Yes Historical Provider, MD   glipiZIDE (GLUCOTROL) 10 MG tablet Take 10 mg by mouth daily    Yes Historical Provider, MD   amLODIPine (NORVASC) 10 MG tablet Take 10 mg by mouth daily   Yes Historical Provider, MD   enalapril (VASOTEC) 5 MG tablet Take 5 mg by mouth daily   Yes Historical Provider, MD   metoprolol succinate (TOPROL XL) 100 MG extended release tablet Take 100 mg by mouth daily   Yes Historical Provider, MD   melatonin 5 MG TABS tablet Take 5 mg by mouth nightly   Yes Historical Provider, MD   Multiple Vitamin (MULTI-VITAMIN DAILY PO) Take 1 tablet by mouth daily    Yes Historical Provider, MD   Coenzyme Q10 (COQ-10) 100 MG CAPS Take 100 mg by mouth nightly    Yes Historical Provider, MD   metformin (GLUCOPHAGE) 500 MG tablet Take 1,000 mg by mouth 2 times daily (with meals)    Yes Historical Provider, MD   omeprazole (PRILOSEC) 20 MG capsule Take 20 mg by mouth daily.      Yes Historical Provider, MD   azaTHIOprine (IMURAN) 50 MG tablet Take 50 mg by mouth daily. Yes Historical Provider, MD   clopidogrel (PLAVIX) 75 MG tablet Take 75 mg by mouth daily. Yes Historical Provider, MD   atorvastatin (LIPITOR) 10 MG tablet Take 10 mg by mouth nightly    Yes Historical Provider, MD       ALLERGIES:  Codeine    SOCIAL Hx:  Social History     Socioeconomic History    Marital status:      Spouse name: Not on file    Number of children: Not on file    Years of education: Not on file    Highest education level: Not on file   Occupational History    Not on file   Tobacco Use    Smoking status: Former Smoker     Packs/day: 1.00     Quit date: 2006     Years since quitting: 15.8    Smokeless tobacco: Never Used   Substance and Sexual Activity    Alcohol use: Yes     Comment: occasional    Drug use: Not Currently     Types: Marijuana    Sexual activity: Yes     Partners: Female   Other Topics Concern    Not on file   Social History Narrative    Not on file     Social Determinants of Health     Financial Resource Strain:     Difficulty of Paying Living Expenses:    Food Insecurity:     Worried About Running Out of Food in the Last Year:     [address] in the Last Year:    Transportation Needs:     Lack of Transportation (Medical):      Lack of Transportation (Non-Medical):    Physical Activity:     Days of Exercise per Week:     Minutes of Exercise per Session:    Stress:     Feeling of Stress :    Social Connections:     Frequency of Communication with Friends and Family:     Frequency of Social Gatherings with Friends and Family:     Attends Gnosticist Services:     Active Member of Clubs or Organizations:     Attends Club or Organization Meetings:     Marital Status:    Intimate Partner Violence:     Fear of Current or Ex-Partner:     Emotionally Abused:     Physically Abused:     Sexually Abused:        ROS: Positive in bold  General:   Denies chills, fatigue, fever, malaise, night sweats or weight loss    Psychological:   Denies anxiety, disorientation or hallucinations    ENT:    Denies epistaxis, headaches, vertigo or visual changes    Cardiovascular:   Denies any chest pain, irregular heartbeats, or palpitations. No paroxysmal nocturnal dyspnea. Respiratory:   Denies shortness of breath, coughing, sputum production, hemoptysis, or wheezing. No orthopnea. Gastrointestinal:   Denies nausea, vomiting, diarrhea, or constipation. Denies any abdominal pain. Denies change in bowel habits or stools. Genito-Urinary:    Denies any urgency, frequency, hematuria. Voiding without difficulty. Musculoskeletal:   Denies joint pain, joint stiffness, joint swelling or muscle pain    Neurology:    Denies any headache or focal neurological deficits. No weakness or paresthesia. Derm:    Denies any rashes, ulcers, or excoriations. Denies bruising. Extremities:   Denies any lower extremity swelling or edema. PHYSICAL EXAM: Abnormal findings noted  VITALS:  Vitals:    10/28/21 0715   BP: 131/63   Pulse: 78   Resp: 12   Temp: 98.8 °F (37.1 °C)   SpO2: 95%         CONSTITUTIONAL:    Awake, alert, cooperative, no apparent distress, and appears stated age    EYES:     EOMI, sclera clear, conjunctiva normal    ENT:    Normocephalic, atraumatic,  External ears without lesions. NECK:    Supple, symmetrical, trachea midline, , no JVD    HEMATOLOGIC/LYMPHATICS:    No cervical lymphadenopathy and no supraclavicular lymphadenopathy    LUNGS:    Symmetric. No increased work of breathing, good air exchange, clear to auscultation bilaterally, no wheezes, rhonchi, or rales,     CARDIOVASCULAR:    Normal apical impulse, regular rate and rhythm, normal S1 and S2, no S3 or S4, and no murmur noted    ABDOMEN:     soft, non-distended, non-tender    MUSCULOSKELETAL:    There is no redness, warmth, or swelling of the joints. NEUROLOGIC:    Awake, alert, oriented to name, place and time.       SKIN:    No bruising or bleeding. No redness, warmth, or swelling    EXTREMITIES:    Peripheral pulses present. No edema, cyanosis, or swelling. LINES/CATHETERS     LABORATORY DATA:  CBC with Differential:    Lab Results   Component Value Date    WBC 8.3 10/28/2021    RBC 2.49 10/28/2021    HGB 8.5 10/28/2021    HCT 26.8 10/28/2021     10/28/2021    .4 10/28/2021    MCH 33.3 10/28/2021    MCHC 31.3 10/28/2021    RDW 20.7 10/28/2021    NRBC 0.9 10/27/2021    SEGSPCT 69 08/25/2013    BANDSPCT 2 03/24/2016    METASPCT 0.9 09/16/2021    LYMPHOPCT 9.6 10/28/2021    MONOPCT 0.9 10/28/2021    BASOPCT 0.9 10/28/2021    MONOSABS 0.08 10/28/2021    LYMPHSABS 0.83 10/28/2021    EOSABS 0.22 10/28/2021    BASOSABS 0.07 10/28/2021     CMP:    Lab Results   Component Value Date     10/28/2021    K 4.2 10/28/2021    K 4.6 01/17/2020     10/28/2021    CO2 23 10/28/2021    BUN 34 10/28/2021    CREATININE 1.1 10/28/2021    GFRAA >60 10/28/2021    LABGLOM >60 10/28/2021    GLUCOSE 139 10/28/2021    GLUCOSE 136 07/31/2011    PROT 6.3 10/28/2021    LABALBU 2.7 10/28/2021    LABALBU 4.3 07/31/2011    CALCIUM 7.7 10/28/2021    BILITOT 0.6 10/28/2021    ALKPHOS 97 10/28/2021    AST 42 10/28/2021    ALT 23 10/28/2021       ASSESSMENT/PLAN:  1. Acute on chronic anemia secondary to GI bleed from gastric varices  2. Acute hypoxic respiratory failure  3. Large esophageal varices without bleeding  4. Hyperkalemia-resolved  5. Persistent lactic acidosis  6. CT findings suggestive of hepatic cirrhosis  7. 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 Sigmoid diverticulosis with mild pericolonic stranding with trace loculated fluid in left pelvis  9. History of autoimmune hepatitis  10. Coronary artery disease with stents in place  11. Hypertension  12. Hyperlipidemia  13. Non-insulin-dependent diabetes mellitus-hemoglobin A1c 4.8  14. History of tobacco abuse    Patient presented with abdominal pain and hematemesis.   Patient found to have a drop in H&H. Patient started on Protonix drip. Patient placed on Protonix twice daily. Patient patient started on Rocephin and Flagyl in the setting of CT findings. General surgery consulted. GI consulted. Patient will be transfused 1 unit PRBC. On initial examination patient was not hypoxic. However he developed hypoxia. Home medications reviewed  Case discussed with general surgery and GI. Glucoscans 4 times daily with sliding scale insulin  EGD 10/28-large esophageal varices, gastric varices with fibrin clot, portal hypertensive gastropathy    Review GI notes and discussed with family patient is possibly team transfer to [facility] for further care    CMP, CBC lactic acid in a.m.     Vik Butcher DO  1:15 PM  10/28/2021

## 2021-10-28 NOTE — PROGRESS NOTES
PROGRESS NOTE    By Jojo Fairchild D.O GI Fellow    [facility]  Dr. Anna Matta MD, Dr. Gracie Bergeron MD, Dr Kristi Valladares, Dr. Hiram Land MD, Dr. Sd Alonzo, DO Calderonora Lynda Lozada  73 y.o.  male    SUBJECTIVE: Patient seen and examined at bedside this morning with his wife by the bedside. Patient denies any acute event reported overnight. Patient is day one status post EGD with large Esophageal varices as well as gastric varices with fibrin clot. Patient was also found to have significant portal hypertensive gastropathy. Patient was initiated on octreotide and PPI infusion. Patient admitted to drinking about twenty-four shots of whiskey daily for the past 3 to 4 years. Patient denies any history of IV drug use or viral hepatitis. The wife present at bedside was able to ask questions and all questions were answered appropriately. Patient did not have any additional complaint. Hemodynamically stable. Maintaining saturation at 97% on 3 L. Afebrile otherwise    OBJECTIVE: This morning patient was mildly encephalopathic with some confusion also reported by his wife at bedside.     /63   Pulse 78   Temp 98.8 °F (37.1 °C) (Oral)   Resp 12   SpO2 93%     Gen: NAD, AAO x 3  HEENT:PEERL, no icterus  Heart: RRR, no M/R/G  Lungs: CTAB  Abd.: soft, NT, ND, BS +, no G/R, no HSM  Extr.: no C/C/E, no bruising         Lab Results   Component Value Date    WBC 8.3 10/28/2021    WBC 8.0 10/27/2021    WBC 9.1 10/26/2021    HGB 8.3 10/28/2021    HGB 8.5 10/27/2021    HGB 8.0 10/27/2021    HCT 26.5 10/28/2021    .4 10/28/2021    RDW 20.7 10/28/2021     10/28/2021     10/27/2021     10/26/2021     Lab Results   Component Value Date     10/28/2021    K 4.2 10/28/2021    K 4.6 01/17/2020     10/28/2021    CO2 23 10/28/2021    BUN 34 10/28/2021    CREATININE 1.1 10/28/2021    CALCIUM 7.7 10/28/2021    PROT 6.3 10/28/2021    LABALBU 2.7 10/28/2021    LABALBU 4.3 07/31/2011    BILITOT 0.6 10/28/2021    BILITOT 0.4 10/27/2021    BILITOT 0.4 10/26/2021    ALKPHOS 97 10/28/2021    ALKPHOS 108 10/27/2021    ALKPHOS 110 10/26/2021    AST 42 10/28/2021    AST 40 10/27/2021    AST 41 10/26/2021    ALT 23 10/28/2021    ALT 24 10/27/2021    ALT 22 10/26/2021     Lab Results   Component Value Date    LIPASE 86 10/26/2021     No results found for: AMYLASE      ASSESSMENT/PLAN:    1. Acute blood loss anemia secondary to gastric varices bleed  -Patient is status post EGD with findings of large esophageal bleed as well as gastric versus a fibrin clot. Patient was also found to have portal hypertensive gastropathy.  -Initiated patient on PPI infusion as well as octreotide drip. -Type and cross, please keep 2 units hold at all times.  -Recommend holding Plavix  -Continue supportive care. Monitor H&H and transfuse if hemoglobin drops less than seven per primary team.    2. Hepatic encephalopathy, Grades 1  -I will order an ammonia level and initiate patient on lactulose and rifaximin.  -Continue to monitor mental status and titrate lactulose to 2-3 bowel movement per day. 3. Decompensated cirrhosis, new onset most likely due to GI bleed as mentioned above. -Patient admitted to drinking about 2-4 shots of whiskey per day. Patient has been drinking heavily for the past 3-4 years.  -Patient denies any history of IV drug use or viral hepatitis. -I will initiate liver serology work-up for further investigation of patient cirrhosis. 4. Adrenal mass  -Per general surgery and urology           Other medical issues managed on this admission:  · Diabetes mellitus  · Hyperlipidemia  · Hypertension  · CAD s/p MI and PCI      Pt was discussed with Dr. Norm Hurd and Dr. Andra Blake,   GI Fellow   10/28/2021  10:20 AM    Pt seen and independently examined. Pertinent notes and lab work reviewed. Monitor reviewed showing Sinus rhythm.   D/w Dr. Quita Winslow with physical exam and A&P.  Discussed with patient/family - all questions answered - agreeable with the plan as delineated.     Jordan De Leon MD  10/28/2021  10:50 AM

## 2021-10-29 ENCOUNTER — APPOINTMENT (OUTPATIENT)
Dept: GENERAL RADIOLOGY | Age: 71
DRG: 299 | End: 2021-10-29
Payer: MEDICARE

## 2021-10-29 LAB
ALBUMIN SERPL-MCNC: 2.3 G/DL (ref 3.5–5.2)
ALP BLD-CCNC: 87 U/L (ref 40–129)
ALT SERPL-CCNC: 25 U/L (ref 0–40)
ANA PATTERN: ABNORMAL
ANA TITER: ABNORMAL
ANION GAP SERPL CALCULATED.3IONS-SCNC: 12 MMOL/L (ref 7–16)
ANISOCYTOSIS: ABNORMAL
ANTI-MITOCHONDRIAL AB, IFA: NEGATIVE
ANTI-NUCLEAR ANTIBODY (ANA): POSITIVE
AST SERPL-CCNC: 61 U/L (ref 0–39)
BASOPHILS ABSOLUTE: 0.05 E9/L (ref 0–0.2)
BASOPHILS RELATIVE PERCENT: 0.9 % (ref 0–2)
BILIRUB SERPL-MCNC: 0.6 MG/DL (ref 0–1.2)
BUN BLDV-MCNC: 21 MG/DL (ref 6–23)
BURR CELLS: ABNORMAL
CALCIUM SERPL-MCNC: 7.6 MG/DL (ref 8.6–10.2)
CHLORIDE BLD-SCNC: 115 MMOL/L (ref 98–107)
CO2: 20 MMOL/L (ref 22–29)
CREAT SERPL-MCNC: 1.1 MG/DL (ref 0.7–1.2)
D DIMER: 851 NG/ML DDU
EOSINOPHILS ABSOLUTE: 0.11 E9/L (ref 0.05–0.5)
EOSINOPHILS RELATIVE PERCENT: 1.8 % (ref 0–6)
GFR AFRICAN AMERICAN: >60
GFR NON-AFRICAN AMERICAN: >60 ML/MIN/1.73
GLUCOSE BLD-MCNC: 161 MG/DL (ref 74–99)
HAV IGM SER IA-ACNC: NORMAL
HCT VFR BLD CALC: 23.4 % (ref 37–54)
HCT VFR BLD CALC: 24 % (ref 37–54)
HCT VFR BLD CALC: 24.2 % (ref 37–54)
HCT VFR BLD CALC: 24.2 % (ref 37–54)
HEMOGLOBIN: 7.4 G/DL (ref 12.5–16.5)
HEMOGLOBIN: 7.4 G/DL (ref 12.5–16.5)
HEMOGLOBIN: 7.7 G/DL (ref 12.5–16.5)
HEMOGLOBIN: 7.9 G/DL (ref 12.5–16.5)
HEPATITIS B CORE IGM ANTIBODY: NORMAL
HEPATITIS B SURFACE ANTIGEN INTERPRETATION: NORMAL
HEPATITIS C ANTIBODY INTERPRETATION: NORMAL
LACTIC ACID: 2.1 MMOL/L (ref 0.5–2.2)
LACTIC ACID: 2.1 MMOL/L (ref 0.5–2.2)
LACTIC ACID: 4 MMOL/L (ref 0.5–2.2)
LACTIC ACID: 4 MMOL/L (ref 0.5–2.2)
LIPASE: 50 U/L (ref 13–60)
LYMPHOCYTES ABSOLUTE: 0.71 E9/L (ref 1.5–4)
LYMPHOCYTES RELATIVE PERCENT: 11.5 % (ref 20–42)
MCH RBC QN AUTO: 34.2 PG (ref 26–35)
MCHC RBC AUTO-ENTMCNC: 31.8 % (ref 32–34.5)
MCV RBC AUTO: 107.6 FL (ref 80–99.9)
METER GLUCOSE: 134 MG/DL (ref 74–99)
METER GLUCOSE: 162 MG/DL (ref 74–99)
METER GLUCOSE: 169 MG/DL (ref 74–99)
METER GLUCOSE: 170 MG/DL (ref 74–99)
METER GLUCOSE: 217 MG/DL (ref 74–99)
METER GLUCOSE: 233 MG/DL (ref 74–99)
MONOCYTES ABSOLUTE: 0.3 E9/L (ref 0.1–0.95)
MONOCYTES RELATIVE PERCENT: 5.3 % (ref 2–12)
NEUTROPHILS ABSOLUTE: 4.78 E9/L (ref 1.8–7.3)
NEUTROPHILS RELATIVE PERCENT: 80.5 % (ref 43–80)
OVALOCYTES: ABNORMAL
PDW BLD-RTO: 20.9 FL (ref 11.5–15)
PLATELET # BLD: 146 E9/L (ref 130–450)
PMV BLD AUTO: 11 FL (ref 7–12)
POIKILOCYTES: ABNORMAL
POLYCHROMASIA: ABNORMAL
POTASSIUM SERPL-SCNC: 4.4 MMOL/L (ref 3.5–5)
PRO-BNP: 783 PG/ML (ref 0–125)
RBC # BLD: 2.25 E12/L (ref 3.8–5.8)
REASON FOR REJECTION: NORMAL
REJECTED TEST: NORMAL
SMOOTH MUSCLE ANTIBODY: NEGATIVE
SODIUM BLD-SCNC: 147 MMOL/L (ref 132–146)
TOTAL PROTEIN: 6 G/DL (ref 6.4–8.3)
TROPONIN, HIGH SENSITIVITY: 28 NG/L (ref 0–11)
WBC # BLD: 5.9 E9/L (ref 4.5–11.5)

## 2021-10-29 PROCEDURE — 2580000003 HC RX 258: Performed by: INTERNAL MEDICINE

## 2021-10-29 PROCEDURE — 2500000003 HC RX 250 WO HCPCS: Performed by: INTERNAL MEDICINE

## 2021-10-29 PROCEDURE — 2700000000 HC OXYGEN THERAPY PER DAY

## 2021-10-29 PROCEDURE — 87635 SARS-COV-2 COVID-19 AMP PRB: CPT

## 2021-10-29 PROCEDURE — 80053 COMPREHEN METABOLIC PANEL: CPT

## 2021-10-29 PROCEDURE — 71045 X-RAY EXAM CHEST 1 VIEW: CPT

## 2021-10-29 PROCEDURE — 6370000000 HC RX 637 (ALT 250 FOR IP): Performed by: INTERNAL MEDICINE

## 2021-10-29 PROCEDURE — 74018 RADEX ABDOMEN 1 VIEW: CPT

## 2021-10-29 PROCEDURE — 85025 COMPLETE CBC W/AUTO DIFF WBC: CPT

## 2021-10-29 PROCEDURE — 6360000002 HC RX W HCPCS: Performed by: INTERNAL MEDICINE

## 2021-10-29 PROCEDURE — 83690 ASSAY OF LIPASE: CPT

## 2021-10-29 PROCEDURE — C9113 INJ PANTOPRAZOLE SODIUM, VIA: HCPCS | Performed by: INTERNAL MEDICINE

## 2021-10-29 PROCEDURE — 97165 OT EVAL LOW COMPLEX 30 MIN: CPT

## 2021-10-29 PROCEDURE — 83605 ASSAY OF LACTIC ACID: CPT

## 2021-10-29 PROCEDURE — 85018 HEMOGLOBIN: CPT

## 2021-10-29 PROCEDURE — 36415 COLL VENOUS BLD VENIPUNCTURE: CPT

## 2021-10-29 PROCEDURE — 82962 GLUCOSE BLOOD TEST: CPT

## 2021-10-29 PROCEDURE — 85014 HEMATOCRIT: CPT

## 2021-10-29 PROCEDURE — 85378 FIBRIN DEGRADE SEMIQUANT: CPT

## 2021-10-29 PROCEDURE — 83880 ASSAY OF NATRIURETIC PEPTIDE: CPT

## 2021-10-29 PROCEDURE — 94640 AIRWAY INHALATION TREATMENT: CPT

## 2021-10-29 PROCEDURE — 84484 ASSAY OF TROPONIN QUANT: CPT

## 2021-10-29 PROCEDURE — 1200000000 HC SEMI PRIVATE

## 2021-10-29 PROCEDURE — 2580000003 HC RX 258: Performed by: SURGERY

## 2021-10-29 PROCEDURE — 87088 URINE BACTERIA CULTURE: CPT

## 2021-10-29 RX ORDER — METOPROLOL TARTRATE 5 MG/5ML
5 INJECTION INTRAVENOUS ONCE
Status: COMPLETED | OUTPATIENT
Start: 2021-10-29 | End: 2021-10-29

## 2021-10-29 RX ORDER — SODIUM BICARBONATE 650 MG/1
650 TABLET ORAL 3 TIMES DAILY
Status: DISCONTINUED | OUTPATIENT
Start: 2021-10-29 | End: 2021-11-02 | Stop reason: HOSPADM

## 2021-10-29 RX ORDER — METOPROLOL SUCCINATE 50 MG/1
50 TABLET, EXTENDED RELEASE ORAL DAILY
Status: DISCONTINUED | OUTPATIENT
Start: 2021-10-30 | End: 2021-10-30

## 2021-10-29 RX ORDER — MAGNESIUM SULFATE 1 G/100ML
1000 INJECTION INTRAVENOUS ONCE
Status: COMPLETED | OUTPATIENT
Start: 2021-10-29 | End: 2021-10-29

## 2021-10-29 RX ADMIN — ATORVASTATIN CALCIUM 10 MG: 10 TABLET, FILM COATED ORAL at 22:16

## 2021-10-29 RX ADMIN — WATER 1000 MG: 1 INJECTION INTRAMUSCULAR; INTRAVENOUS; SUBCUTANEOUS at 22:17

## 2021-10-29 RX ADMIN — INSULIN LISPRO 2 UNITS: 100 INJECTION, SOLUTION INTRAVENOUS; SUBCUTANEOUS at 10:00

## 2021-10-29 RX ADMIN — IPRATROPIUM BROMIDE AND ALBUTEROL SULFATE 1 AMPULE: .5; 2.5 SOLUTION RESPIRATORY (INHALATION) at 22:21

## 2021-10-29 RX ADMIN — SODIUM CHLORIDE 8 MG/HR: 9 INJECTION, SOLUTION INTRAVENOUS at 16:20

## 2021-10-29 RX ADMIN — IPRATROPIUM BROMIDE AND ALBUTEROL SULFATE 1 AMPULE: .5; 2.5 SOLUTION RESPIRATORY (INHALATION) at 09:42

## 2021-10-29 RX ADMIN — SODIUM CHLORIDE, PRESERVATIVE FREE 10 ML: 5 INJECTION INTRAVENOUS at 10:26

## 2021-10-29 RX ADMIN — METRONIDAZOLE 500 MG: 500 INJECTION, SOLUTION INTRAVENOUS at 00:06

## 2021-10-29 RX ADMIN — RIFAXIMIN 550 MG: 550 TABLET ORAL at 10:24

## 2021-10-29 RX ADMIN — METOPROLOL TARTRATE 5 MG: 1 INJECTION, SOLUTION INTRAVENOUS at 12:43

## 2021-10-29 RX ADMIN — AZATHIOPRINE 75 MG: 50 TABLET ORAL at 10:25

## 2021-10-29 RX ADMIN — IPRATROPIUM BROMIDE AND ALBUTEROL SULFATE 1 AMPULE: .5; 2.5 SOLUTION RESPIRATORY (INHALATION) at 16:50

## 2021-10-29 RX ADMIN — SODIUM BICARBONATE 650 MG: 648 TABLET ORAL at 16:28

## 2021-10-29 RX ADMIN — OCTREOTIDE ACETATE 25 MCG/HR: 500 INJECTION, SOLUTION INTRAVENOUS; SUBCUTANEOUS at 16:17

## 2021-10-29 RX ADMIN — RIFAXIMIN 550 MG: 550 TABLET ORAL at 22:16

## 2021-10-29 RX ADMIN — SODIUM BICARBONATE 650 MG: 648 TABLET ORAL at 22:16

## 2021-10-29 RX ADMIN — LACTULOSE 20 G: 20 SOLUTION ORAL at 22:17

## 2021-10-29 RX ADMIN — METRONIDAZOLE 500 MG: 500 INJECTION, SOLUTION INTRAVENOUS at 16:28

## 2021-10-29 RX ADMIN — METRONIDAZOLE 500 MG: 500 INJECTION, SOLUTION INTRAVENOUS at 10:25

## 2021-10-29 RX ADMIN — INSULIN LISPRO 1 UNITS: 100 INJECTION, SOLUTION INTRAVENOUS; SUBCUTANEOUS at 22:25

## 2021-10-29 RX ADMIN — INSULIN LISPRO 2 UNITS: 100 INJECTION, SOLUTION INTRAVENOUS; SUBCUTANEOUS at 12:52

## 2021-10-29 RX ADMIN — MAGNESIUM SULFATE HEPTAHYDRATE 1000 MG: 1 INJECTION, SOLUTION INTRAVENOUS at 22:18

## 2021-10-29 RX ADMIN — IPRATROPIUM BROMIDE AND ALBUTEROL SULFATE 1 AMPULE: .5; 2.5 SOLUTION RESPIRATORY (INHALATION) at 06:13

## 2021-10-29 RX ADMIN — LACTULOSE 20 G: 20 SOLUTION ORAL at 16:28

## 2021-10-29 RX ADMIN — LACTULOSE 20 G: 20 SOLUTION ORAL at 10:24

## 2021-10-29 ASSESSMENT — PAIN SCALES - GENERAL: PAINLEVEL_OUTOF10: 0

## 2021-10-29 NOTE — PROGRESS NOTES
Department of Internal Medicine  pn    PCP: Nabila Elizalde DO  Admitting Physician: Dr. Karla Barr  Consultants: Dr. Juan Landeros and Dr. Gamaliel Patton  Date of Service: 10/26/2021    CHIEF COMPLAINT:  Hematemesis     HISTORY OF PRESENT ILLNESS:    Patient is a 77-year-old male who presented to the ED due to bout of hematemesis. Patient also complains of heartburn. Patient does admit to melena/blood in stools. Patient had upper endoscopy and colonoscopy earlier this year as well as capsule endoscopy. Patient states currently heartburn is resolved. He does admit to suprapubic abdominal discomfort. He denies any fever or chills. He does admit to shortness of breath with exertion. He denies any chest pain. 10/27/2021  Patient seen and examined on telemetry floor. Patient's wife is at the bedside and case discussed. Patient still having problems with hematemesis and hematochezia. Patient denies chest pain has some vague abdominal discomfort. Patient has occasional mild nausea. He denies any unusual shortness of breath. Patient does complain of his Teran catheter. Patient set up for EGD today. 10/28/2021  Patient seen examined on telemetry floor. Patient is alert oriented person place but patient is still has somewhat of a flat affect. Case discussed with patient's wife at the bedside. BUN/creatinine 34/1.1 with lactic acid ranging 3.1-5.0. Blood sugars ranging 139-188. Serum ammonia is normal at 33. Hemoglobin 8.5 temperature 98.8 with heart rate 70 and blood pressure 131/63. O2 sat 95% on 3 L nasal cannula. Urine output seems to be adequate. EGD yesterday showed large esophageal varices with gastric varices with clot. 10/29/2021  Patient seen examined on telemetry floor. Patient has been tachycardic apparently over the last 12 hours or so. Reviewing the chart the patient's Toprol-XL was held on admission. Patient's wife at the bedside and case discussed.   Patient sitting up in the bed and currently denies any pain. He also denies any unusual shortness of breath. BUN/creatinine 21/1.1 with serum sodium 147. Blood sugars ranging 161-217. Hemoglobin is down to 7.7 today and was high as 8.5 late yesterday morning. WBC and platelet count are stable. Temperature is 98.4 with heart rate 120 with blood pressure 141/56. O2 sat 91% on room air at rest.  Give 5 mg IV push of metoprolol and resume Toprol-XL. PAST MEDICAL Hx:  Past Medical History:   Diagnosis Date    Diabetes mellitus ([facility]     Hepatitis C antibody test positive     Hyperlipidemia     Hypertension     MI, old        PAST SURGICAL Hx:   Past Surgical History:   Procedure Laterality Date    COLONOSCOPY  2020    CORONARY ANGIOPLASTY WITH STENT PLACEMENT      UPPER GASTROINTESTINAL ENDOSCOPY N/A 1/19/2020    EGD BIOPSY performed by Rikki Mast DO at [address] [place] [address] N/A 10/27/2021    EGD ESOPHAGOGASTRODUODENOSCOPY performed by Jacques Carrillo MD at [address] Hx:  History reviewed. No pertinent family history. HOME MEDICATIONS:  Prior to Admission medications    Medication Sig Start Date End Date Taking?  Authorizing Provider   cyanocobalamin ([employer] VITAMIN B12) 1000 MCG tablet Take 1 tablet by mouth daily 9/17/21  Yes Florentin Gomez MD   pyridoxine (RA VITAMIN B-6) 50 MG tablet Take 1 tablet by mouth daily 9/17/21  Yes Florentin Gomez MD   folic acid (FOLVITE) 1 MG tablet Take 1 tablet by mouth daily 9/17/21  Yes Florentin Gomez MD   ferrous sulfate (IRON 325) 325 (65 Fe) MG tablet Take 1 tablet by mouth 2 times daily 9/17/21  Yes Florentin Gomez MD   aspirin 81 MG chewable tablet Take 81 mg by mouth daily   Yes Historical Provider, MD   glipiZIDE (GLUCOTROL) 10 MG tablet Take 10 mg by mouth daily    Yes Historical Provider, MD   amLODIPine (NORVASC) 10 MG tablet Take 10 mg by mouth daily   Yes Historical Provider, MD   enalapril (VASOTEC) 5 MG tablet Take 5 mg by mouth daily Yes Historical Provider, MD   metoprolol succinate (TOPROL XL) 100 MG extended release tablet Take 100 mg by mouth daily   Yes Historical Provider, MD   melatonin 5 MG TABS tablet Take 5 mg by mouth nightly   Yes Historical Provider, MD   Multiple Vitamin (MULTI-VITAMIN DAILY PO) Take 1 tablet by mouth daily    Yes Historical Provider, MD   Coenzyme Q10 (COQ-10) 100 MG CAPS Take 100 mg by mouth nightly    Yes Historical Provider, MD   metformin (GLUCOPHAGE) 500 MG tablet Take 1,000 mg by mouth 2 times daily (with meals)    Yes Historical Provider, MD   omeprazole (PRILOSEC) 20 MG capsule Take 20 mg by mouth daily. Yes Historical Provider, MD   azaTHIOprine (IMURAN) 50 MG tablet Take 50 mg by mouth daily. Yes Historical Provider, MD   clopidogrel (PLAVIX) 75 MG tablet Take 75 mg by mouth daily. Yes Historical Provider, MD   atorvastatin (LIPITOR) 10 MG tablet Take 10 mg by mouth nightly    Yes Historical Provider, MD       ALLERGIES:  Codeine    SOCIAL Hx:  Social History     Socioeconomic History    Marital status:      Spouse name: Not on file    Number of children: Not on file    Years of education: Not on file    Highest education level: Not on file   Occupational History    Not on file   Tobacco Use    Smoking status: Former Smoker     Packs/day: 1.00     Quit date: 2006     Years since quitting: 15.8    Smokeless tobacco: Never Used   Substance and Sexual Activity    Alcohol use: Yes     Comment: occasional    Drug use: Not Currently     Types: Marijuana    Sexual activity: Yes     Partners: Female   Other Topics Concern    Not on file   Social History Narrative    Not on file     Social Determinants of Health     Financial Resource Strain:     Difficulty of Paying Living Expenses:    Food Insecurity:     Worried About Running Out of Food in the Last Year:     [address] in the Last Year:    Transportation Needs:     Lack of Transportation (Medical):      Lack of Transportation (Non-Medical):    Physical Activity:     Days of Exercise per Week:     Minutes of Exercise per Session:    Stress:     Feeling of Stress :    Social Connections:     Frequency of Communication with Friends and Family:     Frequency of Social Gatherings with Friends and Family:     Attends Faith Services:     Active Member of Clubs or Organizations:     Attends Club or Organization Meetings:     Marital Status:    Intimate Partner Violence:     Fear of Current or Ex-Partner:     Emotionally Abused:     Physically Abused:     Sexually Abused:        ROS: Positive in bold  General:   Denies chills, fatigue, fever, malaise, night sweats or weight loss    Psychological:   Denies anxiety, disorientation or hallucinations    ENT:    Denies epistaxis, headaches, vertigo or visual changes    Cardiovascular:   Denies any chest pain, irregular heartbeats, or palpitations. No paroxysmal nocturnal dyspnea. Respiratory:   Denies shortness of breath, coughing, sputum production, hemoptysis, or wheezing. No orthopnea. Gastrointestinal:   Denies nausea, vomiting, diarrhea, or constipation. Denies any abdominal pain. Denies change in bowel habits or stools. Genito-Urinary:    Denies any urgency, frequency, hematuria. Voiding without difficulty. Musculoskeletal:   Denies joint pain, joint stiffness, joint swelling or muscle pain    Neurology:    Denies any headache or focal neurological deficits. No weakness or paresthesia. Derm:    Denies any rashes, ulcers, or excoriations. Denies bruising. Extremities:   Denies any lower extremity swelling or edema.       PHYSICAL EXAM: Abnormal findings noted  VITALS:  Vitals:    10/29/21 0815   BP: (!) 141/56   Pulse: 124   Resp: 18   Temp: 98.4 °F (36.9 °C)   SpO2: 91%         CONSTITUTIONAL:    Awake, alert, cooperative, no apparent distress, and appears stated age    EYES:     EOMI, sclera clear, conjunctiva normal    ENT: Normocephalic, atraumatic,  External ears without lesions. NECK:    Supple, symmetrical, trachea midline, , no JVD    HEMATOLOGIC/LYMPHATICS:    No cervical lymphadenopathy and no supraclavicular lymphadenopathy    LUNGS:    Symmetric. No increased work of breathing, good air exchange, clear to auscultation bilaterally, no wheezes, rhonchi, or rales,     CARDIOVASCULAR:    Normal apical impulse, regular rate and rhythm, normal S1 and S2, no S3 or S4, and no murmur noted    ABDOMEN:     soft, non-distended, non-tender    MUSCULOSKELETAL:    There is no redness, warmth, or swelling of the joints. NEUROLOGIC:    Awake, alert, oriented to name, place and time. SKIN:    No bruising or bleeding. No redness, warmth, or swelling    EXTREMITIES:    Peripheral pulses present. No edema, cyanosis, or swelling.     LINES/CATHETERS     LABORATORY DATA:  CBC with Differential:    Lab Results   Component Value Date    WBC 5.9 10/29/2021    RBC 2.25 10/29/2021    HGB 7.7 10/29/2021    HCT 24.2 10/29/2021     10/29/2021    .6 10/29/2021    MCH 34.2 10/29/2021    MCHC 31.8 10/29/2021    RDW 20.9 10/29/2021    NRBC 0.9 10/27/2021    SEGSPCT 69 08/25/2013    BANDSPCT 2 03/24/2016    METASPCT 0.9 09/16/2021    LYMPHOPCT 11.5 10/29/2021    MONOPCT 5.3 10/29/2021    BASOPCT 0.9 10/29/2021    MONOSABS 0.30 10/29/2021    LYMPHSABS 0.71 10/29/2021    EOSABS 0.11 10/29/2021    BASOSABS 0.05 10/29/2021     CMP:    Lab Results   Component Value Date     10/29/2021    K 4.4 10/29/2021    K 4.6 01/17/2020     10/29/2021    CO2 20 10/29/2021    BUN 21 10/29/2021    CREATININE 1.1 10/29/2021    GFRAA >60 10/29/2021    LABGLOM >60 10/29/2021    GLUCOSE 161 10/29/2021    GLUCOSE 136 07/31/2011    PROT 6.0 10/29/2021    LABALBU 2.3 10/29/2021    LABALBU 4.3 07/31/2011    CALCIUM 7.6 10/29/2021    BILITOT 0.6 10/29/2021    ALKPHOS 87 10/29/2021    AST 61 10/29/2021    ALT 25 10/29/2021 ASSESSMENT/PLAN:  1. Acute on chronic anemia secondary to GI bleed from gastric varices  2. Acute hypoxic respiratory failure  3. Large esophageal varices without bleeding  4. Hyperkalemia-resolved  5. Persistent lactic acidosis  6. CT findings suggestive of hepatic cirrhosis  7. 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 Sigmoid diverticulosis with mild pericolonic stranding with trace loculated fluid in left pelvis  9. History of autoimmune hepatitis  10. Coronary artery disease with stents in place  11. Hypertension  12. Hyperlipidemia  13. Non-insulin-dependent diabetes mellitus-hemoglobin A1c 4.8  14. History of tobacco abuse    Patient presented with abdominal pain and hematemesis. Patient found to have a drop in H&H. Patient started on Protonix drip. Patient placed on Protonix twice daily. Patient patient started on Rocephin and Flagyl in the setting of CT findings. General surgery consulted. GI consulted. Patient will be transfused 1 unit PRBC. On initial examination patient was not hypoxic. However he developed hypoxia. Home medications reviewed  Case discussed with general surgery and GI. Glucoscans 4 times daily with sliding scale insulin  EGD 10/28-large esophageal varices, gastric varices with fibrin clot, portal hypertensive gastropathy    Review GI notes and discussed with family patient is possibly team transfer to [facility] for further care    Lopressor 5 mg IV push x1  CMP, CBC lactic acid in a.m.     Gilmer Arana DO  12:25 PM  10/29/2021

## 2021-10-29 NOTE — CONSULTS
Department of Podiatry   Consult Note        Reason for Consult:  Nail care    HISTORY OF PRESENT ILLNESS:                The patient is a 73 y.o. male with significant past medical history of GI bleed and anemia. Podiatry consulted due to elongated, thickened toenails x 10 to the bilateral LE. Patient states he has much difficulty bending over to cut his nails. No other pedal complains at this time.  No nausea, vomiting, fevers, chills, CP, SOB    Past Medical History:        Diagnosis Date    [facility]     Hepatitis C antibody test positive     Hyperlipidemia     Hypertension     MI, old    ·     Past Surgical History:        Procedure Laterality Date    COLONOSCOPY  2020    CORONARY ANGIOPLASTY WITH STENT PLACEMENT      UPPER GASTROINTESTINAL ENDOSCOPY N/A 1/19/2020    EGD [facility]SY [facility] by Chris Stanford DO at [facility] N/A 10/27/2021    EGD ESOPHAGOGASTRODUODENOSCOPY performed by Tere Alejandro MD at [place] [address]   ·     Medications Prior to Admission:    · Medications Prior to Admission: cyanocobalamin ([employer] VITAMIN B12) 1000 MCG tablet, Take 1 tablet by mouth daily  · pyridoxine (RA VITAMIN B-6) 50 MG tablet, Take 1 tablet by mouth daily  · folic acid (FOLVITE) 1 MG tablet, Take 1 tablet by mouth daily  · ferrous sulfate (IRON 325) 325 (65 Fe) MG tablet, Take 1 tablet by mouth 2 times daily  · aspirin 81 MG chewable tablet, Take 81 mg by mouth daily  · glipiZIDE (GLUCOTROL) 10 MG tablet, Take 10 mg by mouth daily   · amLODIPine (NORVASC) 10 MG tablet, Take 10 mg by mouth daily  · enalapril (VASOTEC) 5 MG tablet, Take 5 mg by mouth daily  · metoprolol succinate (TOPROL XL) 100 MG extended release tablet, Take 100 mg by mouth daily  · melatonin 5 MG TABS tablet, Take 5 mg by mouth nightly  · Multiple Vitamin (MULTI-VITAMIN DAILY PO), Take 1 tablet by mouth daily   · Coenzyme Q10 (COQ-10) 100 MG CAPS, Take 100 mg by mouth nightly   · metformin (GLUCOPHAGE) 500 MG tablet, Take 1,000 mg by mouth 2 times daily (with meals)   · omeprazole (PRILOSEC) 20 MG capsule, Take 20 mg by mouth daily. · azaTHIOprine (IMURAN) 50 MG tablet, Take 50 mg by mouth daily. · clopidogrel (PLAVIX) 75 MG tablet, Take 75 mg by mouth daily. · atorvastatin (LIPITOR) 10 MG tablet, Take 10 mg by mouth nightly     Allergies:  Codeine    Social History:   · TOBACCO:   reports that he quit smoking about 15 years ago. He smoked 1.00 pack per day. He has never used smokeless tobacco.  · ETOH:   reports current alcohol use. DRUGS:   Social History     Substance and Sexual Activity   Drug Use Not Currently    Types: Marijuana   ·     Family History:   · History reviewed. No pertinent family history. REVIEW OF SYSTEMS:    All pertinent review of systems both positive and negative discussed in HPI      LOWER EXTREMITY EXAMINATION     VASCULAR:  DP and PT pulses palpable. CFT brisk     NEUROLOGIC:  Protective senastion intact to the bialteral LE    DERM:  Nails are elongated, thickened, dystrophic, discolored x 10 to the bialteral LE      MUSCULOSKELETAL: Negative tenderness to palpation of the bilateral LE.  No pain to posterior calf          CONSULTS:  IP CONSULT TO INTERNAL MEDICINE  IP CONSULT TO GENERAL SURGERY  IP CONSULT TO GI  IP CONSULT TO NEPHROLOGY  IP CONSULT TO PODIATRY    MEDICATION:  Scheduled Meds:   [START ON 10/30/2021] metoprolol succinate  50 mg Oral Daily    sodium bicarbonate  650 mg Oral TID    lactulose  20 g Oral TID    rifaximin  550 mg Oral BID    azaTHIOprine  75 mg Oral Daily    atorvastatin  10 mg Oral Nightly    sodium chloride flush  5-40 mL IntraVENous 2 times per day    insulin lispro  0-6 Units SubCUTAneous Q4H    cefTRIAXone (ROCEPHIN) IV  1,000 mg IntraVENous Q24H    metroNIDAZOLE  500 mg IntraVENous Q8H    melatonin  5 mg Oral Nightly    ipratropium-albuterol  1 ampule Inhalation Q4H While awake    sodium chloride flush  5-40 mL IntraVENous 2 times per day    influenza virus vaccine  0.5 mL IntraMUSCular Prior to discharge     Continuous Infusions:   sodium chloride      dextrose      sodium chloride      pantoprozole (PROTONIX) infusion 8 mg/hr (10/28/21 2128)    octreotide (SANDOSTATIN) infusion 25 mcg/hr (10/28/21 1632)    sodium chloride 75 mL/hr at 10/28/21 1921     PRN Meds:.traMADol, glucose, sodium chloride flush, sodium chloride, polyethylene glycol, prochlorperazine, glucose, dextrose, glucagon (rDNA), dextrose, sodium chloride flush, sodium chloride    RADIOLOGY:  XR ABDOMEN (KUB) (SINGLE AP VIEW)   Final Result   Colonic fecal retention. US GALLBLADDER RUQ   Final Result   No evidence of acute cholecystitis. Minimal gallbladder sludge. Liver has   an abnormal echotexture suggesting cirrhosis. No ascites. XR CHEST PORTABLE   Final Result   No acute process. CT ABDOMEN PELVIS W IV CONTRAST Additional Contrast? None   Final Result   Mildly lobulated liver with mild hypertrophy of the left lobe which may   represent cirrhosis. Small volume perihepatic fluid. 4.7 cm left adrenal mass with macroscopic fat characteristic for myelolipoma. Given the size of the lesion, surgical consultation is recommended. Consider   biochemical lab evaluation for functional status and pheochromocytoma prior   to resection. Enlarged and tortuous portosystemic collaterals and prominent Kassandra gastric   varices likely reflecting portal hypertension. Partially contracted gallbladder with questionable mild wall thickening and   pericholecystic stranding. Consider further evaluation with gallbladder   ultrasound. Sigmoid diverticulosis with mild pericolonic stranding along the left pelvic   sidewall with trace loculated fluid in the left side of the pelvis which may   reflect sequela from acute diverticulitis. No free intraperitoneal air. Clinical correlation recommended.       Mild thickening of the urinary bladder wall and perivesical stranding which   may reflect sequela from cystitis or chronic outlet obstruction. XR CHEST PORTABLE   Final Result   No acute abnormality identified. Vitals:    BP (!) 141/56   Pulse 117   Temp 98.4 °F (36.9 °C) (Oral)   Resp 18   SpO2 91%     LABS:   Recent Labs     10/28/21  0455 10/28/21  1200 10/29/21  0532 10/29/21  1212   WBC 8.3  --  5.9  --    HGB 8.3*   < > 7.7* 7.4*   HCT 26.5*   < > 24.2* 24.0*     --  146  --     < > = values in this interval not displayed. Recent Labs     10/27/21  0524 10/28/21  0455 10/29/21  0532      < > 147*   K 5.5*   < > 4.4   *   < > 115*   CO2 22   < > 20*   PHOS 2.5  --   --    BUN 25*   < > 21   CREATININE 1.3*   < > 1.1    < > = values in this interval not displayed. Recent Labs     10/26/21  1940 10/26/21  2036 10/28/21  0455 10/28/21  1159 10/29/21  0532   PROT 6.3*   < > 6.3*  --  6.0*   INR 1.0  --   --  1.1  --    APTT 23.5*  --   --   --   --     < > = values in this interval not displayed. ASSESSMENTS:   Active Problems\  - Onychomycosis x 10 to the bialteral LE    GI bleed           PLAN:  - Patient seen and evaluated bedside  - Charts and labs reviewed   - Nails debrided x 10 to the bilateral LE with a sterile pair of nail nippers. Patient tolerated well without incident.   - Podiatry can sign out at this point as his only complaint of his bilateral LE has been resolved. Please re-consult our services if another pedal complaint arises. - Discussed patient with Dr. Elizabeth Molina, DPM Pod [facility] [id number]  Fellowship-Trained Foot and Ankle Surgeon  Diplomate, [employer] Board of Foot and Ankle Surgeons  [phone]        Thank you for the opportunity to take part in the patient's care. Please do not hesitate to call for any questions or concerns.

## 2021-10-29 NOTE — PROGRESS NOTES
[address] [facility] [place] [facility]  [facility] [place]        [place]:10/29/2021                                                  Patient Name: Timur Cutler    MRN: [id number]    : 1950    Room: [address] [place] [address]      Evaluating OT: Jenny Hager OTR/L #[id number]     Referring Provider and Specific Provider Orders/Date:      10/29/21 1345  OT eval and treat Start: 10/29/21 1345, End: 10/29/21 1345, ONE TIME, Standing Count: 1 Occurrences, R      Lolly Spear, DO      Placement Recommendation: [facility] vs [facility] if patient is able to meet goals        Diagnosis:   1. Hematemesis with nausea    2. Upper GI bleed    3. Anemia, unspecified type    4. Hyperkalemia    5. Renal insufficiency    6.  Hyperglycemia           Surgery: None       Pertinent Medical History:       Past Medical History:   Diagnosis Date    Diabetes mellitus ([facility] [address]     Hepatitis C antibody test positive     Hyperlipidemia     Hypertension     MI, old          Past Surgical History:   Procedure Laterality Date    COLONOSCOPY      CORONARY ANGIOPLASTY WITH STENT PLACEMENT      UPPER GASTROINTESTINAL ENDOSCOPY N/A 2020    EGD BIOPSY performed by Dax Dixon DO at [address] [place] [address] N/A 10/27/2021    EGD ESOPHAGOGASTRODUODENOSCOPY performed by Martha Wright MD at [facility] ENDOSCOPY        Precautions:  Fall Risk, 3L supplemental O2 via NC      Assessment of current deficits    [x] Functional mobility  [x]ADLs  [x] Strength               []Cognition    [x] Functional transfers   [x] IADLs         [x] Safety Awareness   [x]Endurance    [] Fine Coordination              [x] Balance      [] Vision/perception   []Sensation     []Gross Motor Coordination  [] ROM  [] Delirium                   [] Motor Control     OT PLAN OF CARE   OT POC based on physician orders, patient diagnosis and results of clinical assessment    Frequency/Duration 1-3 days/wk for 2 weeks PRN     Specific OT Treatment Interventions to include:   * Instruction/training on adapted ADL techniques and AE recommendations to increase functional independence within precautions       * Training on energy conservation strategies, correct breathing pattern and techniques to improve independence/tolerance for self-care routine  * Functional transfer/mobility training/DME recommendations for increased independence, safety, and fall prevention  * Patient/Family education to increase follow through with safety techniques and functional independence  * Recommendation of environmental modifications for increased safety with functional transfers/mobility and ADLs  * Therapeutic exercise to improve motor endurance, ROM, and functional strength for ADLs/functional transfers  * Therapeutic activities to facilitate/challenge dynamic balance, stand tolerance for increased safety and independence with ADLs    Recommended Adaptive Equipment: sock aide, reacher      Home Living: with spouse; single family home, 1st floor set-up. Bathroom set-up: tub/shower         Equipment owned: wheeled walker, shower chair    Prior Level of Function: Independent with ADLs , wife completes all IADLs; ambulated independently     Driving: occasionally   Occupation: retired   Enjoys: n/a      Pain Level: Pt denied pain this date.        Cognition: A&O: 4/4; Follows 2-3 step directions   Memory: fair - pt oriented x4 however wife points out patients apparent memory deficits    Sequencing: fair    Problem solving: fair    Judgement/safety: abraham     [facility]   AM-PAC Daily Activity Inpatient   How much help for putting on and taking off regular lower body clothing?: A Lot  How much help for Bathing?: A Lot  How much help for Toileting?: A Lot  How much help for putting on and taking off regular upper body clothing?: A Little  How much help for taking care of personal grooming?: A Little  How much help for eating meals?: A Little  AM-[facility] Inpatient Daily Activity Raw Score: 15  AM-PAC Inpatient ADL T-Scale Score : 34.69  ADL Inpatient CMS 0-100% Score: 56.46  ADL Inpatient CMS G-Code Modifier : CK     Functional Assessment:    Initial Eval Status  Date: 10/29/21 Treatment Status  Date: STGs = LTGs  Time frame: 10-14 days   Feeding Supervision     Independent    Grooming Minimal Assist     Moderate [place]    UB Dressing Minimal Assist     Moderate [place]    LB Dressing Moderate Assist   Assist to don undergarments over hips upon standing at commode. Time/effort noted to doff/don sock seated at EOB via cross-leg technique. Moderate [place]    Bathing Moderate Assist   Moderate [place]    Toileting Moderate Assist   For rear  Hygiene/thoroughness   Moderate [place]    Bed Mobility  Supine to sit:    Sit to supine: Supervision     Supine to sit: Independent   Sit to supine: Independent    Functional Transfers Minimal Assist to stand from commode with cues for use of grab bar. Minimal Assist for sit <> stand transfer from EOB to wheeled walker. Transfer training with verbal/tactile cues for hand placement to improve safety. Moderate [place]    Functional Mobility Minimal Assist with wheeled walker within room to improve balance, verbal cues for walker sequence and safety.    Moderate [place]    Balance Sitting:     Static: fair     Dynamic: fair   Standing: fair  minus with walker   Sitting:     Static: good     Dynamic: good   Standing: good  with walker    Activity Tolerance fair  minus   Increase standing tolerance >3 minutes for improved engagement with functional transfers and indep in ADLs   Visual/  Perceptual Glasses: Yes     Reports changes in vision since admission: No     NA      Hand Dominance: Right      AROM (PROM) Strength Additional Info:    RUE  WFL 4-/5 good  and wfl FMC/dexterity noted during ADL tasks     LUE WFL 4-/5 good  and wfl FMC/dexterity noted during ADL tasks       Hearing:  [facility]   Sensation:   No c/o numbness or tingling  Tone:  WFL   Edema: LEs      Vitals:  HR at rest:  bpm HR with activity: 115 bpm HR at end of session: 108 bpm   SpO2 at rest: % SpO2 with activity: 75% (wife removed pt's nasal canula to take him to BR. Therapist replaced NC and O2 returned to 92% on 3L with recovery time up to x1 min)  SpO2 at end of session: 93%   BP at rest:  BP with activity:  BP at end of session:      Comments: RN cleared patient for OT. Upon arrival patient on commode, wife in room. Therapist facilitated and instructed pt on adapted  techniques & compensatory strategies to improve safety and independence with basic ADLs, bed mobility, functional transfers and mobility to allow pt to achieve highest level of independence and safely. Pt demonstrated fair understanding of education & follow through. At end of session, patient was in supine with call light and phone within reach, all lines and tubes intact. Overall, patient demonstrated  decreased independence and safety during completion of ADL tasks. Pt instructed on/completed UE there ex/HEP for improved endurance and strength with ADLs. Pt would benefit from continued skilled OT to increase safety and independence with completion of ADL tasks and functional mobility for improved quality of life. Rehab Potential: Good for established goals. Patient / Family Goal: Possible plans for transfer to [email or web address] . Patient and/or family were instructed on functional diagnosis, prognosis/goals and OT plan of care. Demonstrated fair understanding.      Eval Complexity: Low    Time In: 3:00 PM   Time Out: 3:20 PM    Total Treatment Time: 0       Min Units   OT Eval Low 97165  X  1    OT Eval Medium 03383      OT Eval High [id number]      OT Re-Eval [id number]            ADL/Self Care 01848     Therapeutic Activities 63873       Therapeutic Ex 92043       Orthotic Management 30201       Manual 00521     Neuro Re-Ed [id number]       Non-Billable Time        Evaluation Time additionally includes thorough review of current medical information, gathering information on past medical history/social history and prior level of function, interpretation of standardized testing/informal observation of tasks, assessment of data and development of plan of care and goals.         Evaluating OT: Carisa Mcgarry OTR/L #[id number]

## 2021-10-29 NOTE — PROGRESS NOTES
* 110* 115*   CO2 22 23 20*   BUN 25* 34* 21   CREATININE 1.3* 1.1 1.1   GLUCOSE 145* 139* 161*     Hepatic:   Recent Labs     10/27/21  0524 10/28/21  0455 10/29/21  0532   AST 40* 42* 61*   ALT 24 23 25   BILITOT 0.4 0.6 0.6   ALKPHOS 108 97 87     Troponin: No results for input(s): TROPONINI in the last 72 hours. BNP: No results for input(s): BNP in the last 72 hours. Lipids: No results for input(s): CHOL, HDL in the last 72 hours. Invalid input(s): LDLCALCU  ABGs: No results found for: PHART, PO2ART, NNL3DUL  INR:   Recent Labs     10/26/21  1940 10/28/21  1159   INR 1.0 1.1     -----------------------------------------------------------------  RAD: CT ABDOMEN PELVIS W IV CONTRAST Additional Contrast? None    Result Date: 10/26/2021  EXAMINATION: CT OF THE ABDOMEN AND PELVIS WITH CONTRAST 10/26/2021 7:59 pm TECHNIQUE: CT of the abdomen and pelvis was performed with the administration of intravenous contrast. Multiplanar reformatted images are provided for review. Dose modulation, iterative reconstruction, and/or weight based adjustment of the mA/kV was utilized to reduce the radiation dose to as low as reasonably achievable. COMPARISON: None. HISTORY: ORDERING SYSTEM PROVIDED HISTORY: uppergi bleed, hematemesis TECHNOLOGIST PROVIDED HISTORY: Additional Contrast?->None Reason for exam:->uppergi bleed, hematemesis Decision Support Exception - unselect if not a suspected or confirmed emergency medical condition->Emergency Medical Condition (MA) FINDINGS: Lower Chest: Chronic interstitial changes in the lung bases with small calcified granuloma. No evidence of airspace consolidation or pleural effusions. Organs: Mildly lobulated liver with mild hypertrophy of the left lobe suggest cirrhosis. Perihepatic fluid. Splenic granuloma. There is a heterogeneous mass arising from the left adrenal gland with foci of macroscopic fat, small calcifications and large soft tissue component.  The presence of macroscopic fat is characteristic for myelolipoma. The mass measures approximately 4.7 cm in diameter. Given the size of the lesion surgical consultation is recommended. Consider biochemical lab evaluation for functional status and pheochromocytoma prior to resection. There are enlarged and tortuous portosystemic collaterals which may reflect portal hypertension. Prominent perigastric varices. Bilateral renal cysts, no obstructive uropathy. Partially contracted gallbladder with questionable mild wall thickening and pericholecystic stranding. Consider further evaluation with gallbladder ultrasound. GI/Bowel: No evidence of bowel obstruction or free intraperitoneal air. No colitis or diverticulitis. Multiple diverticula along the sigmoid colon. There is mild pericolonic stranding along the left pelvic sidewall with trace loculated fluid in the left side of the pelvis adjacent to the sigmoid colon. This may reflect sequela from acute diverticulitis. Clinical correlation recommended. Pelvis: Urinary bladder incompletely distended. Mild thickening of the urinary bladder wall and perivesical stranding which may reflect sequela from cystitis or chronic outlet obstruction. Peritoneum/Retroperitoneum: No abdominal aortic aneurysm or retroperitoneal adenopathy. Bones/Soft Tissues: No acute bony pathology. Mildly lobulated liver with mild hypertrophy of the left lobe which may represent cirrhosis. Small volume perihepatic fluid. 4.7 cm left adrenal mass with macroscopic fat characteristic for myelolipoma. Given the size of the lesion, surgical consultation is recommended. Consider biochemical lab evaluation for functional status and pheochromocytoma prior to resection. Enlarged and tortuous portosystemic collaterals and prominent Kassandra gastric varices likely reflecting portal hypertension. Partially contracted gallbladder with questionable mild wall thickening and pericholecystic stranding.   Consider further evaluation with gallbladder ultrasound. Sigmoid diverticulosis with mild pericolonic stranding along the left pelvic sidewall with trace loculated fluid in the left side of the pelvis which may reflect sequela from acute diverticulitis. No free intraperitoneal air. Clinical correlation recommended. Mild thickening of the urinary bladder wall and perivesical stranding which may reflect sequela from cystitis or chronic outlet obstruction. US GALLBLADDER RUQ    Result Date: 10/27/2021  EXAMINATION: RIGHT UPPER QUADRANT ULTRASOUND 10/27/2021 8:09 am COMPARISON: 12/22/2006 HISTORY: ORDERING SYSTEM PROVIDED HISTORY: abd pain TECHNOLOGIST PROVIDED HISTORY: Reason for exam:->abd pain What reading provider will be dictating this exam?->CRC FINDINGS: LIVER:  Liver demonstrates heterogeneous echotexture with a mildly nodular contour but no focal mass. Findings suggest cirrhosis. Portal vein appears to have a normal direction of flow and is patent. BILIARY SYSTEM:  Gallbladder appears normal in size with minimal dependent sludge. No tenderness. Gallbladder wall is 3 mm. No pericholecystic fluid. No shadowing calculi. Common bile duct is within normal limits measuring 2 mm. RIGHT KIDNEY: Right kidney demonstrates cortical thinning, normal renal cortical echogenicity. No hydronephrosis mass or calculus. Right kidney measures 11.1 x 5.8 x 6.3 cm PANCREAS:  Visualized portions of the pancreas are unremarkable. OTHER: No evidence of right upper quadrant ascites. No evidence of acute cholecystitis. Minimal gallbladder sludge. Liver has an abnormal echotexture suggesting cirrhosis. No ascites. XR CHEST PORTABLE    Result Date: 10/27/2021  EXAMINATION: ONE XRAY VIEW OF THE CHEST 10/27/2021 6:08 am COMPARISON: 10/26/2021 HISTORY: ORDERING SYSTEM PROVIDED HISTORY: hypoxia TECHNOLOGIST PROVIDED HISTORY: Reason for exam:->hypoxia FINDINGS: The lungs are without acute focal process.   There is no effusion or pneumothorax. The cardiomediastinal silhouette is without acute process. The osseous structures are without acute process. There are few scattered punctate benign less than 5 mm calcified granulomas within the right and left lungs. These findings are stable. No acute process. XR CHEST PORTABLE    Result Date: 10/26/2021  EXAMINATION: ONE XRAY VIEW OF THE CHEST 10/26/2021 8:40 pm COMPARISON: 01/17/2020 HISTORY: ORDERING SYSTEM PROVIDED HISTORY: upper gi bleed TECHNOLOGIST PROVIDED HISTORY: Reason for exam:->upper gi bleed FINDINGS: There is no cardiomegaly. There is no vascular congestion. There are multiple unchanged calcified granulomata bilaterally. Remaining lungs are clear. There is no pleural effusion or pneumothorax. No acute abnormality identified. Objective:   Vitals:   Vitals:    10/29/21 0815   BP: (!) 141/56   Pulse: 124   Resp: 18   Temp: 98.4 °F (36.9 °C)   SpO2: 91%     Patient Vitals for the past 24 hrs:   BP Temp Temp src Pulse Resp SpO2   10/29/21 0815 (!) 141/56 98.4 °F (36.9 °C) Oral 124 18 91 %   10/28/21 1930 (!) 131/51 99 °F (37.2 °C) Oral 117 20 94 %   10/28/21 1859 -- -- -- -- -- 92 %     General appearance: alert, appears stated age and cooperative  Skin: Skin color, texture, turgor normal. No rashes or lesions  HEENT: Head: Normocephalic, no lesions, without obvious abnormality. Neck: no adenopathy, no carotid bruit, no JVD, supple, symmetrical, trachea midline and thyroid not enlarged, symmetric, no tenderness/mass/nodules  Lungs: CTA  Heart: regular rate and rhythm, S1, S2 normal, no murmur, click, rub or gallop  Abdomen: soft, non-tender; bowel sounds normal; no masses,  no organomegaly  Extremities: extremities normal, atraumatic, no cyanosis or edema  Neurologic: Mental status: Alert, oriented, thought content appropriate      Assessment/Plan:   1. Lactic acidosis. significant lactic acidosis with a lactic acid of 11 mmol/L upon presentation.   The patient also had  an element of acute kidney injury upon presentation - initial lactic acidosis was at least in part due to the use of metformin in the setting of acute kidney injury - subsequently, lactic acid has improved  PLAN: follow serial bicarbonate levels and lactic acid levels. Will supplement bicarbonate. Keep the patient off metformin. 2.  Hyperkalemia. Hyperkalemia is probably reflection of acute kidney  injury and the use of ACE inhibitor. Serum potassium was 6.9 mmol/L. It is much improved. discontinued Lokelma. 3.  Acute kidney injury. Acute kidney injury probably secondary to  volume depletion in the setting of nausea, vomiting and diarrhea and use  of ACE inhibitor. serum creatinine was 1.4 mg/dL upon presentation with a baseline of 0.9 mg/dL. Serum creatinine is improving. 4.  Hypertension with chronic kidney disease, stage G1 to G2. The  patient's hypertension control is fair. We will leave the patient off  an ACE inhibitor. 5.  Underlying hepatic insufficiency possibly secondary to cirrhosis. GI workup in progress. 6.  Chronic kidney disease stage G2 with a baseline serum creatinine of 0.9 mg/dL. MARY LOU Montero - CNP    Patient seen and examined. Wife present at bedside. Patient is overall feeling much better. Chart reviewed. I had a face to face encounter with the patient. Agree with exam.    Agree with  formulation, assessment and plan as outlined above and directed by me. Addition and corrections were done as deemed appropriate. My exam and plan include:    Start sodium bicarbonate supplements. Continue current treatment.       Logan Segundo MD  Nephrology        Electronically signed by oLgan Segundo MD on 10/29/2021 at 3:13 PM

## 2021-10-29 NOTE — PROGRESS NOTES
PROGRESS NOTE    By Sam Guerrero D.O GI Fellow    [facility]  Dr. Ximena Armenta MD, Dr. Vick Galvan MD, Dr Av Boyle, Dr. Sirisha Mclain MD, Dr. Aaron Sellers, DO Ishaan Yip [facility]  73 y.o.  male    SUBJECTIVE: Patient was seen and examined bedside this morning. Patient did not have any acute event reported overnight. Patient mentation seem to slightly improved per his wife at bedside as well. Patient was able to answer all the questions asked appropriately. Patient admits to some vague left upper quadrant abdominal pain. But denies any additional hematemesis or rectal bleeding. H&H is remained stable. Vitals are okay. No additional complaints.     OBJECTIVE:    BP (!) 141/56   Pulse 124   Temp 98.4 °F (36.9 °C) (Oral)   Resp 18   SpO2 91%     Gen: NAD, AAO x 3  HEENT:PEERL, no icterus  Heart: RRR, no M/R/G  Lungs: CTAB  Abd.: soft, NT, ND, BS +, no G/R, no HSM  Extr.: no C/C/E, no bruising         Lab Results   Component Value Date    WBC 5.9 10/29/2021    WBC 8.3 10/28/2021    WBC 8.0 10/27/2021    HGB 7.7 10/29/2021    HGB 7.9 10/29/2021    HGB 7.8 10/28/2021    HCT 24.2 10/29/2021    .6 10/29/2021    RDW 20.9 10/29/2021     10/29/2021     10/28/2021     10/27/2021     Lab Results   Component Value Date     10/29/2021    K 4.4 10/29/2021    K 4.6 01/17/2020     10/29/2021    CO2 20 10/29/2021    BUN 21 10/29/2021    CREATININE 1.1 10/29/2021    CALCIUM 7.6 10/29/2021    PROT 6.0 10/29/2021    LABALBU 2.3 10/29/2021    LABALBU 4.3 07/31/2011    BILITOT 0.6 10/29/2021    BILITOT 0.6 10/28/2021    BILITOT 0.4 10/27/2021    ALKPHOS 87 10/29/2021    ALKPHOS 97 10/28/2021    ALKPHOS 108 10/27/2021    AST 61 10/29/2021    AST 42 10/28/2021    AST 40 10/27/2021    ALT 25 10/29/2021    ALT 23 10/28/2021    ALT 24 10/27/2021     Lab Results   Component Value Date    LIPASE 50 10/29/2021    LIPASE 86 10/26/2021     No results found for: AMYLASE      ASSESSMENT/PLAN:    1.  Acute blood loss anemia secondary to gastric variceal bleed  -Patient is status post EGD with findings of large esophageal bleed as well as gastric fibrin clot. Patient was also found to have portal hypertensive gastropathy.  -Initiated patient on PPI infusion as well as octreotide drip.  -Patient hemoglobin and hematocrit has remained low but stable. -Type and cross, please keep 2 units hold at all times.  -Recommend holding Plavix  -Continue supportive care. -Monitor H&H and transfuse if hemoglobin drops less than seven per primary team.       2. Hepatic encephalopathy, Grade 1  -Patient was initiated on lactulose and rifaximin   -Continue to monitor mental status and titrate lactulose to 2-3 bowel movement per day.     3. 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 Patient has been drinking heavily for the past 3-4 years.  -Patient denies any history of IV drug use or viral hepatitis. -Patient will most likely benefit from a TIPS procedure and over will await liver serology work-up and plan for possible transfer to [facility] clinic for further evaluation for TIPS procedure.     4. Adrenal mass  -Per general surgery and urology           Other medical issues managed on this admission:  · Diabetes mellitus  · Hyperlipidemia  · Hypertension  · CAD s/p MI and PCI      Pt was discussed with attending    Mirela Kent DO  GI Fellow   10/29/2021  11:26 AM     Patient seen and examined independently. Discussed with fellow and agree with the plan of care stated above.     Octavio Garcia DO  10/29/2021  11:34 PM

## 2021-10-29 NOTE — CARE COORDINATION
10/29/2021 1023 CM note: No covid testing. Follow up visit made to pt and wife at bedside. Pt on IV Protonix and IV Sandostatin gtts. He remains on [email or web address] [place] and does NOT have home o2. IF home o2 is needed, pt prefers [place]. Per gi, pt may need tertiary care for TIPS procedure. Pt has [facility], both [facility] and [address] are in network facilities. Pt started on xifaxin-if plan is for xifaxin at [place], will need rx to obtain prior auth/copay.  Rosa M CARTER

## 2021-10-29 NOTE — CONSULTS
[address]                                  CONSULTATION    PATIENT NAME: Ochoa Smith                      :        1950  MED REC NO:   [id number]                            ROOM:       2472  ACCOUNT NO:   [de-identified]                           ADMIT DATE: 10/26/2021  PROVIDER:     Brent Dominguez MD    CONSULT DATE:  10/28/2021    AGE:  68-year-old. ADMITTING PHYSICIAN:  Dr. Carrizales Fludottie:  Persistent lactic acidosis. HISTORY OF PRESENT ILLNESS:  The patient is being seen in consultation  at the request of Dr. Leticia White. The patient is a 68-year-old gentleman  with a history of diabetes mellitus, hypertension, hyperlipidemia, and  abdominal pain. He also had an episode of hematemesis. He has been  evaluated by Gastroenterology. He had EGD which revealed large  esophageal varices as well as gastric varices. He was found to have a  portal hypertension. He has been started on therapy with octreotide and  proton pump inhibitor. He has been diagnosed with hepatic  encephalopathy and decompensated cirrhosis. He apparently does have a  history of alcohol use and has been drinking two to four shots of  whiskey a day and prior to that was drinking heavily for the last three  to four years. Incidentally, the patient has been having persistently  high lactic acid level. His initial lactic acid level upon presentation  was 11.0 mmol/L. Later on, he did fall down to 2.4 mmol/L and has gone  back up to 5.0 mmol/L today. A review of the patient's vital signs does  not reveal any episodes of hypotension, although his blood pressure  yesterday did fall down to 93/40. When seen up on the floor, his wife  is present at the bedside. The patient wants his further care to be  done at the [facility] and a referral has been made to transfer  the patient to [facility].   The patient also mentioned above. FAMILY HISTORY:  Family history is unremarkable. He denies any family  history of hypertension or diabetes mellitus. REVIEW OF SYSTEMS:  Review of systems is negative for fever or chills. No cough or wheezing. He has shortness of breath. No chest pain or  palpitations. He has no weight loss or weight gain. He has nausea,  vomiting, and abdominal pain. No dysuria, hesitancy, urgency, increased  or decreased frequency of micturition. Rest of the review of systems is  negative. PHYSICAL EXAMINATION:  GENERAL:  The patient is awake and alert, in no acute distress. VITAL SIGNS:  Blood pressure is 131/63, pulse is 70, respiratory rate is  12, and temperature 98.8 degrees Fahrenheit. HEENT:  Head is normocephalic and atraumatic. Eyes:  Sclerae are  nonicteric. Pupils are equal and reactive to light and accommodation. Fundus examination is deferred. Mouth and throat:  Dry oral mucosa  without any mucosal ulceration or exudate. NECK:  Supple. No distention. No carotid bruits. No palpable masses. CHEST:  Symmetrical.  LUNGS:  Vesicular breath sounds. Breath sounds decreased at the bases. No rales or rhonchi. HEART:  Regular rate and rhythm without any pericardial rub or gallop. ABDOMEN:  Soft, mildly distended. No tenderness or rebound tenderness. Normoactive bowel sounds. EXTREMITIES:  Trace bipedal edema. LABORATORY DATA:  Lab data from earlier today; sodium 143 mmol/L,  potassium 4.2 mmol/L, chloride 110 mmol/L, CO2 of 23 mmol/L, BUN 34  mg/dL, creatinine 1.1 mg/dL, lactic acid 5.0 mmol/L. Hemoglobin 8.5  gm/dL. IMPRESSION:  1. Lactic acidosis. The patient did have significant lactic acidosis  with a lactic acid of 11 mmol/L upon presentation. The patient also had  an element of acute kidney injury upon presentation with a serum  creatinine higher than its previous baseline. A serum creatinine was  1.4 mg/dL upon presentation with a baseline of 0.9 mg/dL. The initial  lactic acidosis was at least in part due to the use of metformin in the  setting of acute kidney injury. Subsequently, lactic acid has improved  but has been higher for the last couple of days. This may represent  lactic acidosis due to hepatic failure. In hepatic failure, there is  reduced utilization of lactic leading to lactic acidosis. The patient's  bicarbonate levels have improved. Serum bicarbonate has improved from  18 mmol/L to 23 mmol/L at this time. We will follow serial bicarbonate  levels and lactic acid levels. If needed, we will supplement  bicarbonate. Keep the patient off metformin. 2.  Hyperkalemia. Hyperkalemia is probably reflection of acute kidney  injury and the use of ACE inhibitor. Serum potassium was 6.9 mmol/L. It is much improved. We will discontinue Lokelma. 3.  Acute kidney injury. Acute kidney injury probably secondary to  volume depletion in the setting of nausea, vomiting and diarrhea and use  of ACE inhibitor. Serum creatinine is improving. 4.  Hypertension with chronic kidney disease, stage G1 to G2. The  patient's hypertension control is fair. We will leave the patient off  an ACE inhibitor. 5.  Underlying hepatic insufficiency possibly secondary to cirrhosis. GI workup in progress. 6.  Chronic kidney disease stage G2 with a baseline serum creatinine of  0.9 mg/dL. PLAN:  Plan is to follow serial bicarbonate levels and lactic acid  levels. If needed, we will supplement bicarbonate. Rest of plans per  orders. Thank you for allowing me to participate in the care of this patient. We will follow the patient with you. We will discontinue the [facility].         Gordo Paige MD    D: 10/28/2021 15:25:08       T: 10/28/2021 19:09:44     [email or web address]  Job#: [id number]     Doc#: [id number]    CC:

## 2021-10-29 NOTE — PROGRESS NOTES
Physical Therapy    Physical Therapy Initial Evaluation/Plan of Care    Room #:  0415/0415-01  Patient Name: Arielle Bazzi  YOB: 1950  MRN: [id number]    Date of Service: 10/31/2021     Tentative placement recommendation: Home  Equipment recommendation: [address]      Evaluating Physical Therapist: Curtis Lindsay, PT  #[id number]      Specific Provider Orders/Date/Referring Provider :  10/29/21 1345   PT eval and treat Start: 10/29/21 1345, End: 10/29/21 1345, ONE TIME, Standing Count: 1 Occurrences, R    [place] Leak, DO      Admitting Diagnosis:   Hyperkalemia [E87.5]  GI bleed [K92.2]  Upper GI bleed [K92.2]  Hyperglycemia [R73.9]  Renal insufficiency [N28.9]  Hematemesis with nausea [K92.0]  Anemia, unspecified type [D64.9]       Surgery: none  Visit Diagnoses       Codes    Hematemesis with nausea    -  Primary K92.0    Upper GI bleed     K92.2    Hyperkalemia     E87.5    Renal insufficiency     N28.9    Hyperglycemia     R73.9          Patient Active Problem List   Diagnosis    Anemia    GI bleed        ASSESSMENT of Current Deficits Patient exhibits decreased balance and endurance impairing functional mobility, gait , gait distance and tolerance to activity are barriers to d/c and require skilled intervention during hospital stay to attain pre hospital level of function. Decreased endurance (new o2 user) and balance   increases patient's risk for fall. PHYSICAL THERAPY  PLAN OF CARE       Physical therapy plan of care is established based on physician order,  patient diagnosis and clinical assessment    Current Treatment Recommendations:    -Standing Balance: Perform strengthening exercises in standing to promote motor control with or without upper extremity support   -Transfers: Provide instruction on proper hand and foot position for adequate transfer of weight onto lower extremities and use of gait device if needed and Cues for hand placement, technique and safety.  Provide stabilization to prevent fall   -Gait: Gait training and Standing activities to improve: base of support, weight shift, weight bearing    -Endurance: Utilize Supervised activities to increase level of endurance to allow for safe functional mobility including transfers and gait   -Stairs: Stair training with instruction on proper technique and hand placement on rail    PT long term treatment goals are located in below grid    Patient and or family understand(s) diagnosis, prognosis, and plan of care. Frequency of treatments: Patient will be seen  daily. Prior Level of Function: Patient ambulated independently    Rehab Potential: good    for baseline    Past medical history:   Past Medical History:   Diagnosis Date    Diabetes mellitus ([facility] [address]     Hepatitis C antibody test positive     Hyperlipidemia     Hypertension     MI, old      Past Surgical History:   Procedure Laterality Date    COLONOSCOPY  2020    CORONARY ANGIOPLASTY WITH STENT PLACEMENT      UPPER GASTROINTESTINAL ENDOSCOPY N/A 1/19/2020    EGD BIOPSY performed by Octavio Garcia DO at [address] N/A 10/27/2021    EGD ESOPHAGOGASTRODUODENOSCOPY performed by Lyly Emery MD at [facility]:    Precautions:  Up with assistance and Up as tolerated, falls, alarm and O2 , 4 Liters of o2 via nasal cannula   Social history: Patient lives with spouse in a two story home resides first  with 3 steps  to enter with Rail  Tub shower grab bars    Equipment owned: Shower chair,       [facility]   How much difficulty turning over in bed?: None  How much difficulty sitting down on / standing up from a chair with arms?: A Little  How much difficulty moving from lying on back to sitting on side of bed?: None  How much help from another person moving to and from a bed to a chair?: A Little  How much help from another person needed to walk in hospital room?: A Little  How much help from another person for climbing 3-5 steps with a railing?: A Little  AM-PAC Inpatient Mobility Raw Score : 20  AM-PAC Inpatient T-Scale Score : 47.67  Mobility Inpatient CMS 0-100% Score: 35.83  Mobility Inpatient CMS G-Code Modifier : [address] cleared patient for PT evaluation. The admitting diagnosis and active problem list as listed above have been reviewed prior to the initiation of this evaluation. OBJECTIVE;   Initial Evaluation  Date: 10/31/2021 Treatment Date:     Short Term/ Long Term   Goals   Was pt agreeable to Eval/treatment? Yes  To be met in 3 days   Pain level   0/10        Bed Mobility    Rolling: Independent    Supine to sit:  Independent    Sit to supine: Independent    Scooting: Independent        Transfers Sit to stand: Supervision  from bed and chair  Sit to stand: Independent     Ambulation    3x75 feet using  wheeled walker with Minimal assist of 1   cues for walker approximation, safety and proper hand placement and 15 feet without device min a however unsteady   100 feet using  least restrictive device versus no device with Independent    Stair negotiation: ascended and descended   Not assessed     3 steps with rail independent    ROM Within functional limits    Increase range of motion 10% of affected joints    Strength BUE:  4-/5  RLE:  4-/5  LLE:  4-/5  Increase strength in affected mm groups by 1/3 grade   Balance Sitting EOB:  good    Dynamic Standing:  fair minus unsteady  Sitting EOB:  good    Dynamic Standing: good       Patient is Alert & Oriented x person, place, time and situation and follows directions    Sensation:  Patient  reports numbness/tingling bilateral lower extremities     Edema:  yes bilateral lower extremities and bilateral upper extremities   Endurance: fair       Vitals: 4 liters nasal cannula   Blood Pressure at rest  Blood Pressure during session    Heart Rate at rest 80 Heart Rate during session 100   SPO2 at rest 96%  SPO2 during session 99%     Patient education  Patient educated on role of Physical Therapy, risks of immobility, safety and plan of care and  importance of mobility while in hospital      Patient response to education:   Pt verbalized understanding Pt demonstrated skill Pt requires further education in this area   Yes Partial Yes      Treatment:  Patient practiced and was instructed/facilitated in the following treatment: Patient   Sat edge of bed 10 minutes with Supervision  to increase dynamic sitting balance and activity tolerance. seated and standing challenges      Therapeutic Exercises:  not performed       At end of session, patient in chair with spouse present call light and phone within reach,  all lines and tubes intact, nursing notified. Patient would benefit from continued skilled Physical Therapy to improve functional independence and quality of life. Patient's/ family goals   home    Time in  1034  Time out  1104    Total Treatment Time  10 minutes    Evaluation time includes thorough review of current medical information, gathering information on past medical history/social history and prior level of function, completion of standardized testing/informal observation of tasks, assessment of data, and development of Plan of care and goals.      CPT codes:  Low Complexity PT evaluation (54219)  Therapeutic activities (19644)   10 minutes  1 unit(s)    Cary Kahn PT

## 2021-10-30 ENCOUNTER — APPOINTMENT (OUTPATIENT)
Dept: CT IMAGING | Age: 71
DRG: 299 | End: 2021-10-30
Payer: MEDICARE

## 2021-10-30 LAB
ADRENOCORTICOTROPIC HORMONE: 11.4 PG/ML (ref 7.2–63.3)
ALBUMIN SERPL-MCNC: 2.5 G/DL (ref 3.5–5.2)
ALDOSTERONE: 5.4 NG/DL
ALP BLD-CCNC: 101 U/L (ref 40–129)
ALT SERPL-CCNC: 26 U/L (ref 0–40)
AMMONIA: 24 UMOL/L (ref 16–60)
ANION GAP SERPL CALCULATED.3IONS-SCNC: 11 MMOL/L (ref 7–16)
ANISOCYTOSIS: ABNORMAL
APTT: 27.3 SEC (ref 24.5–35.1)
AST SERPL-CCNC: 64 U/L (ref 0–39)
B.E.: -1.7 MMOL/L (ref -3–3)
BASOPHILIC STIPPLING: ABNORMAL
BASOPHILS ABSOLUTE: 0.06 E9/L (ref 0–0.2)
BASOPHILS RELATIVE PERCENT: 1 % (ref 0–2)
BILIRUB SERPL-MCNC: 0.5 MG/DL (ref 0–1.2)
BUN BLDV-MCNC: 14 MG/DL (ref 6–23)
CALCIUM SERPL-MCNC: 7.9 MG/DL (ref 8.6–10.2)
CERULOPLASMIN: 22 MG/DL (ref 15–30)
CHLORIDE BLD-SCNC: 117 MMOL/L (ref 98–107)
CO2: 18 MMOL/L (ref 22–29)
COHB: 0.3 % (ref 0–1.5)
CREAT SERPL-MCNC: 1.1 MG/DL (ref 0.7–1.2)
CRITICAL: ABNORMAL
DATE ANALYZED: ABNORMAL
DATE OF COLLECTION: ABNORMAL
EOSINOPHILS ABSOLUTE: 0.24 E9/L (ref 0.05–0.5)
EOSINOPHILS RELATIVE PERCENT: 4 % (ref 0–6)
GFR AFRICAN AMERICAN: >60
GFR NON-AFRICAN AMERICAN: >60 ML/MIN/1.73
GLUCOSE BLD-MCNC: 132 MG/DL (ref 74–99)
HCO3: 22.7 MMOL/L (ref 22–26)
HCT VFR BLD CALC: 22.9 % (ref 37–54)
HCT VFR BLD CALC: 23.3 % (ref 37–54)
HCT VFR BLD CALC: 25 % (ref 37–54)
HEMOGLOBIN: 7.3 G/DL (ref 12.5–16.5)
HEMOGLOBIN: 7.4 G/DL (ref 12.5–16.5)
HEMOGLOBIN: 7.9 G/DL (ref 12.5–16.5)
HEPATITIS B CORE TOTAL ANTIBODY: NONREACTIVE
HHB: 9.6 % (ref 0–5)
HYPOCHROMIA: ABNORMAL
INR BLD: 1.1
LAB: ABNORMAL
LACTIC ACID: 1.8 MMOL/L (ref 0.5–2.2)
LACTIC ACID: 2.2 MMOL/L (ref 0.5–2.2)
LACTIC ACID: 2.6 MMOL/L (ref 0.5–2.2)
LACTIC ACID: 5.3 MMOL/L (ref 0.5–2.2)
LYMPHOCYTES ABSOLUTE: 0.84 E9/L (ref 1.5–4)
LYMPHOCYTES RELATIVE PERCENT: 14 % (ref 20–42)
Lab: ABNORMAL
MAGNESIUM: 1.8 MG/DL (ref 1.6–2.6)
MCH RBC QN AUTO: 34.6 PG (ref 26–35)
MCHC RBC AUTO-ENTMCNC: 31.6 % (ref 32–34.5)
MCV RBC AUTO: 109.6 FL (ref 80–99.9)
METER GLUCOSE: 132 MG/DL (ref 74–99)
METER GLUCOSE: 141 MG/DL (ref 74–99)
METER GLUCOSE: 143 MG/DL (ref 74–99)
METER GLUCOSE: 160 MG/DL (ref 74–99)
METER GLUCOSE: 179 MG/DL (ref 74–99)
METER GLUCOSE: 179 MG/DL (ref 74–99)
METER GLUCOSE: 295 MG/DL (ref 74–99)
METHB: 0.4 % (ref 0–1.5)
MODE: ABNORMAL
MONOCYTES ABSOLUTE: 1.14 E9/L (ref 0.1–0.95)
MONOCYTES RELATIVE PERCENT: 19 % (ref 2–12)
NEUTROPHILS ABSOLUTE: 3.72 E9/L (ref 1.8–7.3)
NEUTROPHILS RELATIVE PERCENT: 62 % (ref 43–80)
O2 CONTENT: 10.7 ML/DL
O2 SATURATION: 90.3 % (ref 92–98.5)
O2HB: 89.7 % (ref 94–97)
OPERATOR ID: 7291
PATIENT TEMP: 37 C
PCO2: 37 MMHG (ref 35–45)
PDW BLD-RTO: 21.3 FL (ref 11.5–15)
PH BLOOD GAS: 7.41 (ref 7.35–7.45)
PHOSPHORUS: 3 MG/DL (ref 2.5–4.5)
PLATELET # BLD: 126 E9/L (ref 130–450)
PMV BLD AUTO: 11.3 FL (ref 7–12)
PO2: 64.4 MMHG (ref 75–100)
POLYCHROMASIA: ABNORMAL
POTASSIUM SERPL-SCNC: 4.3 MMOL/L (ref 3.5–5)
PROTHROMBIN TIME: 13.2 SEC (ref 9.3–12.4)
RBC # BLD: 2.28 E12/L (ref 3.8–5.8)
REASON FOR REJECTION: NORMAL
REJECTED TEST: NORMAL
SARS-COV-2, NAAT: NOT DETECTED
SODIUM BLD-SCNC: 146 MMOL/L (ref 132–146)
SOURCE, BLOOD GAS: ABNORMAL
THB: 8.4 G/DL (ref 11.5–16.5)
TIME ANALYZED: 554
TOTAL PROTEIN: 6.6 G/DL (ref 6.4–8.3)
TROPONIN, HIGH SENSITIVITY: 28 NG/L (ref 0–11)
WBC # BLD: 6 E9/L (ref 4.5–11.5)

## 2021-10-30 PROCEDURE — 2580000003 HC RX 258: Performed by: INTERNAL MEDICINE

## 2021-10-30 PROCEDURE — 85610 PROTHROMBIN TIME: CPT

## 2021-10-30 PROCEDURE — 94640 AIRWAY INHALATION TREATMENT: CPT

## 2021-10-30 PROCEDURE — 85018 HEMOGLOBIN: CPT

## 2021-10-30 PROCEDURE — 83735 ASSAY OF MAGNESIUM: CPT

## 2021-10-30 PROCEDURE — 1200000000 HC SEMI PRIVATE

## 2021-10-30 PROCEDURE — C9113 INJ PANTOPRAZOLE SODIUM, VIA: HCPCS | Performed by: INTERNAL MEDICINE

## 2021-10-30 PROCEDURE — 84100 ASSAY OF PHOSPHORUS: CPT

## 2021-10-30 PROCEDURE — 84484 ASSAY OF TROPONIN QUANT: CPT

## 2021-10-30 PROCEDURE — 82140 ASSAY OF AMMONIA: CPT

## 2021-10-30 PROCEDURE — 82962 GLUCOSE BLOOD TEST: CPT

## 2021-10-30 PROCEDURE — 93005 ELECTROCARDIOGRAM TRACING: CPT | Performed by: INTERNAL MEDICINE

## 2021-10-30 PROCEDURE — 6360000002 HC RX W HCPCS: Performed by: INTERNAL MEDICINE

## 2021-10-30 PROCEDURE — 80053 COMPREHEN METABOLIC PANEL: CPT

## 2021-10-30 PROCEDURE — 85014 HEMATOCRIT: CPT

## 2021-10-30 PROCEDURE — 36415 COLL VENOUS BLD VENIPUNCTURE: CPT

## 2021-10-30 PROCEDURE — 2500000003 HC RX 250 WO HCPCS: Performed by: INTERNAL MEDICINE

## 2021-10-30 PROCEDURE — 85730 THROMBOPLASTIN TIME PARTIAL: CPT

## 2021-10-30 PROCEDURE — 6370000000 HC RX 637 (ALT 250 FOR IP): Performed by: INTERNAL MEDICINE

## 2021-10-30 PROCEDURE — 71275 CT ANGIOGRAPHY CHEST: CPT

## 2021-10-30 PROCEDURE — 2700000000 HC OXYGEN THERAPY PER DAY

## 2021-10-30 PROCEDURE — 70450 CT HEAD/BRAIN W/O DYE: CPT

## 2021-10-30 PROCEDURE — 2580000003 HC RX 258: Performed by: SURGERY

## 2021-10-30 PROCEDURE — 82805 BLOOD GASES W/O2 SATURATION: CPT

## 2021-10-30 PROCEDURE — 6360000004 HC RX CONTRAST MEDICATION: Performed by: RADIOLOGY

## 2021-10-30 PROCEDURE — 83605 ASSAY OF LACTIC ACID: CPT

## 2021-10-30 PROCEDURE — 85025 COMPLETE CBC W/AUTO DIFF WBC: CPT

## 2021-10-30 RX ORDER — IPRATROPIUM BROMIDE AND ALBUTEROL SULFATE 2.5; .5 MG/3ML; MG/3ML
1 SOLUTION RESPIRATORY (INHALATION) EVERY 4 HOURS PRN
Status: DISCONTINUED | OUTPATIENT
Start: 2021-10-30 | End: 2021-11-02 | Stop reason: HOSPADM

## 2021-10-30 RX ORDER — FUROSEMIDE 10 MG/ML
20 INJECTION INTRAMUSCULAR; INTRAVENOUS ONCE
Status: DISCONTINUED | OUTPATIENT
Start: 2021-10-30 | End: 2021-11-02 | Stop reason: HOSPADM

## 2021-10-30 RX ORDER — FUROSEMIDE 10 MG/ML
20 INJECTION INTRAMUSCULAR; INTRAVENOUS ONCE
Status: COMPLETED | OUTPATIENT
Start: 2021-10-30 | End: 2021-10-30

## 2021-10-30 RX ORDER — BUDESONIDE 0.5 MG/2ML
500 INHALANT ORAL 2 TIMES DAILY
Status: DISCONTINUED | OUTPATIENT
Start: 2021-10-30 | End: 2021-11-02 | Stop reason: HOSPADM

## 2021-10-30 RX ORDER — METOPROLOL SUCCINATE 100 MG/1
100 TABLET, EXTENDED RELEASE ORAL DAILY
Status: DISCONTINUED | OUTPATIENT
Start: 2021-10-30 | End: 2021-11-02 | Stop reason: HOSPADM

## 2021-10-30 RX ADMIN — IPRATROPIUM BROMIDE AND ALBUTEROL SULFATE 1 AMPULE: .5; 2.5 SOLUTION RESPIRATORY (INHALATION) at 13:30

## 2021-10-30 RX ADMIN — INSULIN LISPRO 3 UNITS: 100 INJECTION, SOLUTION INTRAVENOUS; SUBCUTANEOUS at 08:30

## 2021-10-30 RX ADMIN — INSULIN LISPRO 1 UNITS: 100 INJECTION, SOLUTION INTRAVENOUS; SUBCUTANEOUS at 15:15

## 2021-10-30 RX ADMIN — LACTULOSE 20 G: 20 SOLUTION ORAL at 21:51

## 2021-10-30 RX ADMIN — SODIUM BICARBONATE 650 MG: 648 TABLET ORAL at 15:09

## 2021-10-30 RX ADMIN — METRONIDAZOLE 500 MG: 500 INJECTION, SOLUTION INTRAVENOUS at 16:41

## 2021-10-30 RX ADMIN — SODIUM CHLORIDE 8 MG/HR: 9 INJECTION, SOLUTION INTRAVENOUS at 06:25

## 2021-10-30 RX ADMIN — SODIUM CHLORIDE, PRESERVATIVE FREE 10 ML: 5 INJECTION INTRAVENOUS at 12:34

## 2021-10-30 RX ADMIN — IOPAMIDOL 75 ML: 755 INJECTION, SOLUTION INTRAVENOUS at 03:19

## 2021-10-30 RX ADMIN — RIFAXIMIN 550 MG: 550 TABLET ORAL at 21:52

## 2021-10-30 RX ADMIN — IPRATROPIUM BROMIDE AND ALBUTEROL SULFATE 1 AMPULE: .5; 2.5 SOLUTION RESPIRATORY (INHALATION) at 10:14

## 2021-10-30 RX ADMIN — METRONIDAZOLE 500 MG: 500 INJECTION, SOLUTION INTRAVENOUS at 01:38

## 2021-10-30 RX ADMIN — RIFAXIMIN 550 MG: 550 TABLET ORAL at 12:21

## 2021-10-30 RX ADMIN — SODIUM BICARBONATE 650 MG: 648 TABLET ORAL at 21:51

## 2021-10-30 RX ADMIN — LACTULOSE 20 G: 20 SOLUTION ORAL at 15:09

## 2021-10-30 RX ADMIN — Medication 5 MG: at 21:51

## 2021-10-30 RX ADMIN — INSULIN LISPRO 1 UNITS: 100 INJECTION, SOLUTION INTRAVENOUS; SUBCUTANEOUS at 21:39

## 2021-10-30 RX ADMIN — BUDESONIDE 500 MCG: 0.5 INHALANT RESPIRATORY (INHALATION) at 10:14

## 2021-10-30 RX ADMIN — INSULIN LISPRO 1 UNITS: 100 INJECTION, SOLUTION INTRAVENOUS; SUBCUTANEOUS at 01:37

## 2021-10-30 RX ADMIN — SODIUM BICARBONATE 650 MG: 648 TABLET ORAL at 12:21

## 2021-10-30 RX ADMIN — LACTULOSE 20 G: 20 SOLUTION ORAL at 12:21

## 2021-10-30 RX ADMIN — ATORVASTATIN CALCIUM 10 MG: 10 TABLET, FILM COATED ORAL at 21:52

## 2021-10-30 RX ADMIN — SODIUM CHLORIDE, PRESERVATIVE FREE 10 ML: 5 INJECTION INTRAVENOUS at 12:35

## 2021-10-30 RX ADMIN — FUROSEMIDE 20 MG: 20 INJECTION, SOLUTION INTRAMUSCULAR; INTRAVENOUS at 06:03

## 2021-10-30 RX ADMIN — METOPROLOL SUCCINATE 100 MG: 100 TABLET, EXTENDED RELEASE ORAL at 12:21

## 2021-10-30 RX ADMIN — IPRATROPIUM BROMIDE AND ALBUTEROL SULFATE 1 AMPULE: .5; 2.5 SOLUTION RESPIRATORY (INHALATION) at 17:58

## 2021-10-30 RX ADMIN — BUDESONIDE 500 MCG: 0.5 INHALANT RESPIRATORY (INHALATION) at 17:59

## 2021-10-30 RX ADMIN — IPRATROPIUM BROMIDE AND ALBUTEROL SULFATE 1 AMPULE: .5; 2.5 SOLUTION RESPIRATORY (INHALATION) at 06:08

## 2021-10-30 RX ADMIN — AZATHIOPRINE 75 MG: 50 TABLET ORAL at 09:00

## 2021-10-30 RX ADMIN — METRONIDAZOLE 500 MG: 500 INJECTION, SOLUTION INTRAVENOUS at 12:22

## 2021-10-30 ASSESSMENT — PAIN SCALES - GENERAL: PAINLEVEL_OUTOF10: 0

## 2021-10-30 NOTE — PROGRESS NOTES
Pt removed L arm IV and R arm IV blown upon assessment. Pt intermittent confusion and needs a midline or picc line.  Worked with charge nurse to get paperwork/ order signed and awaiting picc team.

## 2021-10-30 NOTE — PLAN OF CARE
Problem: Falls - Risk of:  Goal: Will remain free from falls  Description: Will remain free from falls  10/30/2021 0458 by Adarsh Leahy RN  Outcome: Met This Shift  10/29/2021 1532 by Akash Clement RN  Outcome: Ongoing  Goal: Absence of physical injury  Description: Absence of physical injury  10/30/2021 0458 by Adarsh Leahy RN  Outcome: Met This Shift  10/29/2021 1532 by Akash Clement RN  Outcome: Ongoing     Problem: Bowel/Gastric:  Goal: Occurrences of nausea will decrease  Description: Occurrences of nausea will decrease  Outcome: Met This Shift     Problem: Pain:  Goal: Control of acute pain  Description: Control of acute pain  Outcome: Met This Shift  Goal: Control of chronic pain  Description: Control of chronic pain  Outcome: Met This Shift     Problem:  Activity:  Goal: Fatigue will decrease  Description: Fatigue will decrease  Outcome: Met This Shift

## 2021-10-30 NOTE — PROGRESS NOTES
PROGRESS NOTE    By Shola South D.O. [facility]  Dr. Bernabe Diaz MD, Dr. Brady Aldrich MD, Dr Blaine Rao, Dr. Zoe Ruvalcaba MD, Dr. Shola South,       Marylene Sickles Kierra  73 y.o.  male    SUBJECTIVE: Patient seen and discussed with staff. Patient is clinically stable without signs of overt bleeding. Hemoglobin is currently stable.      OBJECTIVE:    BP (!) 123/53   Pulse 124   Temp 99.2 °F (37.3 °C) (Oral)   Resp 20   SpO2 94%     Gen: NAD, AAO x 3  HEENT:PEERL, no icterus  Heart: RRR, no M/R/G  Lungs: CTAB  Abd.: soft, NT, ND, BS +, no G/R, no HSM  Extr.: no C/C/E, no bruising         Lab Results   Component Value Date    WBC 6.0 10/30/2021    WBC 5.9 10/29/2021    WBC 8.3 10/28/2021    HGB 7.9 10/30/2021    HGB 7.4 10/30/2021    HGB 7.4 10/29/2021    HCT 25.0 10/30/2021    .6 10/30/2021    RDW 21.3 10/30/2021     10/30/2021     10/29/2021     10/28/2021     Lab Results   Component Value Date     10/30/2021    K 4.3 10/30/2021    K 4.6 01/17/2020     10/30/2021    CO2 18 10/30/2021    BUN 14 10/30/2021    CREATININE 1.1 10/30/2021    CALCIUM 7.9 10/30/2021    PROT 6.6 10/30/2021    LABALBU 2.5 10/30/2021    LABALBU 4.3 07/31/2011    BILITOT 0.5 10/30/2021    BILITOT 0.6 10/29/2021    BILITOT 0.6 10/28/2021    ALKPHOS 101 10/30/2021    ALKPHOS 87 10/29/2021    ALKPHOS 97 10/28/2021    AST 64 10/30/2021    AST 61 10/29/2021    AST 42 10/28/2021    ALT 26 10/30/2021    ALT 25 10/29/2021    ALT 23 10/28/2021     Lab Results   Component Value Date    LIPASE 50 10/29/2021    LIPASE 86 10/26/2021     No results found for: AMYLASE      ASSESSMENT/PLAN:  1.  Acute blood loss anemia secondary to gastric variceal bleed - stable, no overt bleeding  -Patient is status post EGD with findings of large esophageal bleed as well as gastric fibrin clot.  Patient was also found to have portal hypertensive gastropathy.  -Initiated patient on PPI infusion as well as octreotide drip. -Type and cross, please keep 2 units hold at all times.  -Recommend holding Plavix  -Continue supportive care.    -Monitor H&H and transfuse primary team.      2. Hepatic encephalopathy, Grade 1  -Patient was initiated on lactulose and rifaximin   -Continue to monitor mental status and titrate lactulose to 2-3 bowel movement per day.     3. 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  Patient has been drinking heavily for the past 3-4 years.  -Patient denies any history of IV drug use or viral hepatitis.     4. Adrenal mass  -Per general surgery and urology      Patient will most likely benefit from a TIPS evaluation, which can be done as an outpatient.        Jefe Medrano DO  10/30/2021  10:37 AM

## 2021-10-30 NOTE — PROGRESS NOTES
Department of Internal Medicine  pn    PCP: Celina Schafer DO  Admitting Physician: Dr. Pugh Organ  Consultants: Dr. Leigha Pabon and Dr. Roxanne Das  Date of Service: 10/26/2021    CHIEF COMPLAINT:  Hematemesis     HISTORY OF PRESENT ILLNESS:    Patient is a 72-year-old male who presented to the ED due to bout of hematemesis. Patient also complains of heartburn. Patient does admit to melena/blood in stools. Patient had upper endoscopy and colonoscopy earlier this year as well as capsule endoscopy. Patient states currently heartburn is resolved. He does admit to suprapubic abdominal discomfort. He denies any fever or chills. He does admit to shortness of breath with exertion. He denies any chest pain. 10/27/2021  Patient seen and examined on telemetry floor. Patient's wife is at the bedside and case discussed. Patient still having problems with hematemesis and hematochezia. Patient denies chest pain has some vague abdominal discomfort. Patient has occasional mild nausea. He denies any unusual shortness of breath. Patient does complain of his Teran catheter. Patient set up for EGD today. 10/28/2021  Patient seen examined on telemetry floor. Patient is alert oriented person place but patient is still has somewhat of a flat affect. Case discussed with patient's wife at the bedside. BUN/creatinine 34/1.1 with lactic acid ranging 3.1-5.0. Blood sugars ranging 139-188. Serum ammonia is normal at 33. Hemoglobin 8.5 temperature 98.8 with heart rate 70 and blood pressure 131/63. O2 sat 95% on 3 L nasal cannula. Urine output seems to be adequate. EGD yesterday showed large esophageal varices with gastric varices with clot. 10/29/2021  Patient seen examined on telemetry floor. Patient has been tachycardic apparently over the last 12 hours or so. Reviewing the chart the patient's Toprol-XL was held on admission. Patient's wife at the bedside and case discussed.   Patient sitting up in the bed and currently denies any pain. He also denies any unusual shortness of breath. BUN/creatinine 21/1.1 with serum sodium 147. Blood sugars ranging 161-217. Hemoglobin is down to 7.7 today and was high as 8.5 late yesterday morning. WBC and platelet count are stable. Temperature is 98.4 with heart rate 120 with blood pressure 141/56. O2 sat 91% on room air at rest.  Give 5 mg IV push of metoprolol and resume Toprol-XL. 10/30/2021  Patient seen examined on telemetry floor. Patient apparently is more short of breath earlier a.m. and CTA lungs were ordered and was no evidence of PE and no focal consolidation or pulmonary edema noted. There was a complex 4.4 cm left adrenal mass noted. Patient states he is feeling better currently and denies any problem with any chest pain. Patient's wife is at the bedside and case discussed. BUN/creatinine 14/1.1 with blood sugars ranging 132-295. Hemoglobin ranges 7.3-7.9. Temperature 99.2 with patient's heart rate 124 still blood pressure 123/53 with O2 sat 94% on 3 L nasal cannula. Patient did have a history of smoking about 30 years and smoked 1-2 packs a day. PAST MEDICAL Hx:  Past Medical History:   Diagnosis Date    Diabetes mellitus ([facility] [address]     Hepatitis C antibody test positive     Hyperlipidemia     Hypertension     MI, old        PAST SURGICAL Hx:   Past Surgical History:   Procedure Laterality Date    COLONOSCOPY  2020    CORONARY ANGIOPLASTY WITH STENT PLACEMENT      UPPER GASTROINTESTINAL ENDOSCOPY N/A 1/19/2020    EGD BIOPSY performed by Emile Burns DO at [address] [facility] N/A 10/27/2021    EGD ESOPHAGOGASTRODUODENOSCOPY performed by Werner Christopher MD at [address] Hx:  History reviewed. No pertinent family history. HOME MEDICATIONS:  Prior to Admission medications    Medication Sig Start Date End Date Taking?  Authorizing Provider   cyanocobalamin ([employer] VITAMIN B12) 1000 MCG tablet Take 1 tablet by mouth daily 9/17/21  Yes Huy Kuo MD   pyridoxine (RA VITAMIN B-6) 50 MG tablet Take 1 tablet by mouth daily 9/17/21  Yes Huy Kuo MD   folic acid (FOLVITE) 1 MG tablet Take 1 tablet by mouth daily 9/17/21  Yes Huy Kuo MD   ferrous sulfate (IRON 325) 325 (65 Fe) MG tablet Take 1 tablet by mouth 2 times daily 9/17/21  Yes Huy Kuo MD   aspirin 81 MG chewable tablet Take 81 mg by mouth daily   Yes Historical Provider, MD   glipiZIDE (GLUCOTROL) 10 MG tablet Take 10 mg by mouth daily    Yes Historical Provider, MD   amLODIPine (NORVASC) 10 MG tablet Take 10 mg by mouth daily   Yes Historical Provider, MD   enalapril (VASOTEC) 5 MG tablet Take 5 mg by mouth daily   Yes Historical Provider, MD   metoprolol succinate (TOPROL XL) 100 MG extended release tablet Take 100 mg by mouth daily   Yes Historical Provider, MD   melatonin 5 MG TABS tablet Take 5 mg by mouth nightly   Yes Historical Provider, MD   Multiple Vitamin (MULTI-VITAMIN DAILY PO) Take 1 tablet by mouth daily    Yes Historical Provider, MD   Coenzyme Q10 (COQ-10) 100 MG CAPS Take 100 mg by mouth nightly    Yes Historical Provider, MD   metformin (GLUCOPHAGE) 500 MG tablet Take 1,000 mg by mouth 2 times daily (with meals)    Yes Historical Provider, MD   omeprazole (PRILOSEC) 20 MG capsule Take 20 mg by mouth daily. Yes Historical Provider, MD   azaTHIOprine (IMURAN) 50 MG tablet Take 50 mg by mouth daily. Yes Historical Provider, MD   clopidogrel (PLAVIX) 75 MG tablet Take 75 mg by mouth daily.      Yes Historical Provider, MD   atorvastatin (LIPITOR) 10 MG tablet Take 10 mg by mouth nightly    Yes Historical Provider, MD       ALLERGIES:  Codeine    SOCIAL Hx:  Social History     Socioeconomic History    Marital status:      Spouse name: Not on file    Number of children: Not on file    Years of education: Not on file    Highest education level: Not on file   Occupational History    Not on file   Tobacco Use  Smoking status: Former Smoker     Packs/day: 1.00     Quit date: 2006     Years since quitting: 15.8    Smokeless tobacco: Never Used   Substance and Sexual Activity    Alcohol use: Yes     Comment: occasional    Drug use: Not Currently     Types: Marijuana    Sexual activity: Yes     Partners: Female   Other Topics Concern    Not on file   Social History Narrative    Not on file     Social Determinants of Health     Financial Resource Strain:     Difficulty of Paying Living Expenses:    Food Insecurity:     Worried About Running Out of Food in the Last Year:     [address] in the Last Year:    Transportation Needs:     Lack of Transportation (Medical):  Lack of Transportation (Non-Medical):    Physical Activity:     Days of Exercise per Week:     Minutes of Exercise per Session:    Stress:     Feeling of Stress :    Social Connections:     Frequency of Communication with Friends and Family:     Frequency of Social Gatherings with Friends and Family:     Attends [employer] Services:     Active Member of Clubs or Organizations:     Attends Club or Organization Meetings:     Marital Status:    Intimate Partner Violence:     Fear of Current or Ex-Partner:     Emotionally Abused:     Physically Abused:     Sexually Abused:        ROS: Positive in bold  General:   Denies chills, fatigue, fever, malaise, night sweats or weight loss    Psychological:   Denies anxiety, disorientation or hallucinations    ENT:    Denies epistaxis, headaches, vertigo or visual changes    Cardiovascular:   Denies any chest pain, irregular heartbeats, or palpitations. No paroxysmal nocturnal dyspnea. Respiratory:   Denies shortness of breath, coughing, sputum production, hemoptysis, or wheezing. No orthopnea. Gastrointestinal:   Denies nausea, vomiting, diarrhea, or constipation. Denies any abdominal pain. Denies change in bowel habits or stools.       Genito-Urinary:    Denies any urgency, frequency, contemplating transfer to [facility] outpatient. CMP, CBC lactic acid in a.m.     Gilmer Arana DO  12:32 PM  10/30/2021

## 2021-10-30 NOTE — PLAN OF CARE
Problem: Pain:  Goal: Control of acute pain  Description: Control of acute pain  10/30/2021 0458 by Kat Pradhan RN  Outcome: Met This Shift  Goal: Control of chronic pain  Description: Control of chronic pain  10/30/2021 0458 by Kat Pradhan RN  Outcome: Met This Shift     Problem:  Activity:  Goal: Fatigue will decrease  Description: Fatigue will decrease  10/30/2021 0458 by Kat Pradhan RN  Outcome: Met This Shift

## 2021-10-30 NOTE — PROGRESS NOTES
NEPHROLOGY Attending   Progress Note    Subjective:   Admit Date: 10/26/2021  PCP: Karen Walsh DO    Interval History:     10/30/21:Pt with increased SOB, had CTA        Diet: ADULT DIET; Clear Liquid    Data:   Scheduled Meds:   [START ON 10/30/2021] metoprolol succinate  50 mg Oral Daily    sodium bicarbonate  650 mg Oral TID    lactulose  20 g Oral TID    rifaximin  550 mg Oral BID    azaTHIOprine  75 mg Oral Daily    atorvastatin  10 mg Oral Nightly    sodium chloride flush  5-40 mL IntraVENous 2 times per day    insulin lispro  0-6 Units SubCUTAneous Q4H    cefTRIAXone (ROCEPHIN) IV  1,000 mg IntraVENous Q24H    metroNIDAZOLE  500 mg IntraVENous Q8H    melatonin  5 mg Oral Nightly    ipratropium-albuterol  1 ampule Inhalation Q4H While awake    sodium chloride flush  5-40 mL IntraVENous 2 times per day    influenza virus vaccine  0.5 mL IntraMUSCular Prior to discharge     Continuous Infusions:   sodium chloride      dextrose      sodium chloride      pantoprozole (PROTONIX) infusion 8 mg/hr (10/29/21 1620)    octreotide (SANDOSTATIN) infusion 25 mcg/hr (10/29/21 1617)    sodium chloride 75 mL/hr at 10/28/21 1921     PRN Meds:traMADol, glucose, sodium chloride flush, sodium chloride, polyethylene glycol, prochlorperazine, glucose, dextrose, glucagon (rDNA), dextrose, sodium chloride flush, sodium chloride    Intake/Output Summary (Last 24 hours) at 10/29/2021 2006  Last data filed at 10/29/2021 1208  Gross per 24 hour   Intake 360 ml   Output --   Net 360 ml     CBC:   Recent Labs     10/27/21  0524 10/27/21  1052 10/28/21  0455 10/28/21  1200 10/29/21  0532 10/29/21  1212 10/29/21  1819   WBC 8.0  --  8.3  --  5.9  --   --    HGB 7.3*   < > 8.3*   < > 7.7* 7.4* 7.4*     --  164  --  146  --   --     < > = values in this interval not displayed.      BMP:    Recent Labs     10/27/21  0524 10/28/21  0455 10/29/21  0532    143 147*   K 5.5* 4.2 4.4   * 110* 115*   CO2 22 mass measures approximately 4.7 cm in diameter. Given the size of the lesion surgical consultation is recommended. Consider biochemical lab evaluation for functional status and pheochromocytoma prior to resection. There are enlarged and tortuous portosystemic collaterals which may reflect portal hypertension. Prominent perigastric varices. Bilateral renal cysts, no obstructive uropathy. Partially contracted gallbladder with questionable mild wall thickening and pericholecystic stranding. Consider further evaluation with gallbladder ultrasound. GI/Bowel: No evidence of bowel obstruction or free intraperitoneal air. No colitis or diverticulitis. Multiple diverticula along the sigmoid colon. There is mild pericolonic stranding along the left pelvic sidewall with trace loculated fluid in the left side of the pelvis adjacent to the sigmoid colon. This may reflect sequela from acute diverticulitis. Clinical correlation recommended. Pelvis: Urinary bladder incompletely distended. Mild thickening of the urinary bladder wall and perivesical stranding which may reflect sequela from cystitis or chronic outlet obstruction. Peritoneum/Retroperitoneum: No abdominal aortic aneurysm or retroperitoneal adenopathy. Bones/Soft Tissues: No acute bony pathology. Mildly lobulated liver with mild hypertrophy of the left lobe which may represent cirrhosis. Small volume perihepatic fluid. 4.7 cm left adrenal mass with macroscopic fat characteristic for myelolipoma. Given the size of the lesion, surgical consultation is recommended. Consider biochemical lab evaluation for functional status and pheochromocytoma prior to resection. Enlarged and tortuous portosystemic collaterals and prominent Kassandra gastric varices likely reflecting portal hypertension. Partially contracted gallbladder with questionable mild wall thickening and pericholecystic stranding. Consider further evaluation with gallbladder ultrasound.  Sigmoid diverticulosis with mild pericolonic stranding along the left pelvic sidewall with trace loculated fluid in the left side of the pelvis which may reflect sequela from acute diverticulitis. No free intraperitoneal air. Clinical correlation recommended. Mild thickening of the urinary bladder wall and perivesical stranding which may reflect sequela from cystitis or chronic outlet obstruction. US GALLBLADDER RUQ    Result Date: 10/27/2021  EXAMINATION: RIGHT UPPER QUADRANT ULTRASOUND 10/27/2021 8:09 am COMPARISON: 12/22/2006 HISTORY: ORDERING SYSTEM PROVIDED HISTORY: abd pain TECHNOLOGIST PROVIDED HISTORY: Reason for exam:->abd pain What reading provider will be dictating this exam?->CRC FINDINGS: LIVER:  Liver demonstrates heterogeneous echotexture with a mildly nodular contour but no focal mass. Findings suggest cirrhosis. Portal vein appears to have a normal direction of flow and is patent. BILIARY SYSTEM:  Gallbladder appears normal in size with minimal dependent sludge. No tenderness. Gallbladder wall is 3 mm. No pericholecystic fluid. No shadowing calculi. Common bile duct is within normal limits measuring 2 mm. RIGHT KIDNEY: Right kidney demonstrates cortical thinning, normal renal cortical echogenicity. No hydronephrosis mass or calculus. Right kidney measures 11.1 x 5.8 x 6.3 cm PANCREAS:  Visualized portions of the pancreas are unremarkable. OTHER: No evidence of right upper quadrant ascites. No evidence of acute cholecystitis. Minimal gallbladder sludge. Liver has an abnormal echotexture suggesting cirrhosis. No ascites. XR CHEST PORTABLE    Result Date: 10/27/2021  EXAMINATION: ONE XRAY VIEW OF THE CHEST 10/27/2021 6:08 am COMPARISON: 10/26/2021 HISTORY: ORDERING SYSTEM PROVIDED HISTORY: hypoxia TECHNOLOGIST PROVIDED HISTORY: Reason for exam:->hypoxia FINDINGS: The lungs are without acute focal process. There is no effusion or pneumothorax.  The cardiomediastinal silhouette is without acute process. The osseous structures are without acute process. There are few scattered punctate benign less than 5 mm calcified granulomas within the right and left lungs. These findings are stable. No acute process. XR CHEST PORTABLE    Result Date: 10/26/2021  EXAMINATION: ONE XRAY VIEW OF THE CHEST 10/26/2021 8:40 pm COMPARISON: 01/17/2020 HISTORY: ORDERING SYSTEM PROVIDED HISTORY: upper gi bleed TECHNOLOGIST PROVIDED HISTORY: Reason for exam:->upper gi bleed FINDINGS: There is no cardiomegaly. There is no vascular congestion. There are multiple unchanged calcified granulomata bilaterally. Remaining lungs are clear. There is no pleural effusion or pneumothorax. No acute abnormality identified. EXAMINATION:   CTA OF THE CHEST 10/30/2021 3:19 am       TECHNIQUE:   CTA of the chest was performed after the administration of intravenous   contrast.  Multiplanar reformatted images are provided for review.  MIP   images are provided for review. Dose modulation, iterative reconstruction,   and/or weight based adjustment of the mA/kV was utilized to reduce the   radiation dose to as low as reasonably achievable.       COMPARISON:   None.       HISTORY:   ORDERING SYSTEM PROVIDED HISTORY: evalaute for PE   TECHNOLOGIST PROVIDED HISTORY:   Reason for exam:->evalaute for PE       FINDINGS:   Pulmonary Arteries: Pulmonary arteries are adequately opacified for   evaluation.  No evidence of intraluminal filling defect to suggest pulmonary   embolism.  Main pulmonary artery is normal in caliber.       Mediastinum: No evidence of mediastinal lymphadenopathy.  The heart and   pericardium demonstrate no acute abnormality.  There is no acute abnormality   of the thoracic aorta.       Lungs/pleura: Several scattered calcified granulomata are noted in the lungs.    Mild atelectasis at the left lower lung.  The lungs are without acute   process.  No focal consolidation or pulmonary edema.  No evidence of pleural effusion or pneumothorax.       Upper Abdomen: There is a complex 4.4 cm left adrenal mass.  Recommend   further evaluation with MRI.  Multiple calcified granulomata are noted in the   spleen.  The remainder of the visualized portions of the upper abdomen is   within normal limits.       Soft Tissues/Bones: No acute bone or soft tissue abnormality.           Impression   No evidence of pulmonary embolism or acute pulmonary abnormality.       Complex 4.4 cm left adrenal mass. Recommend further evaluation with MRI. Multiple calcified granulomata are noted in the spleen.               Objective:   Vitals:   Vitals:    10/29/21 1650   BP:    Resp: 18   Temp:    SpO2: 96%     Patient Vitals for the past 24 hrs:   BP Temp Temp src Resp SpO2   10/29/21 1650 -- -- -- 18 96 %   10/29/21 0815 (!) 141/56 98.4 °F (36.9 °C) Oral 18 91 %     General appearance: alert, appears stated age and cooperative  Skin: Skin color, texture, turgor normal. No rashes or lesions  HEENT: Head: Normocephalic, no lesions, without obvious abnormality. Neck: no adenopathy, no carotid bruit, no JVD, supple, symmetrical, trachea midline and thyroid not enlarged, symmetric, no tenderness/mass/nodules  Lungs: CTA  Heart: regular rate and rhythm, S1, S2 normal, no murmur, click, rub or gallop  Abdomen: soft, non-tender; bowel sounds normal; no masses,  no organomegaly  Extremities: extremities normal, atraumatic, no cyanosis or edema  Neurologic: Mental status: Alert, oriented, thought content appropriate      Assessment/Plan:   1. Lactic acidosis-with a lactic acid of 11 mmol/L upon presentation due to the use of metformin in the setting of acute kidney injury - subsequently, lactic acid has improved and was 2.2 this AM and has risen to 2.6  PLAN:   1. follow serial bicarbonate levels and lactic acid levles  2.  supplement bicarbonate. 3.  Keep the patient off metformin. 2.  Hyperkalemia.   Hyperkalemia is probably reflection of acute kidney  injury and the use of ACE inhibitor. Serum potassium was 6.9 mmol/L on presentation 10/26/21  K+ WNL  PLAN:  1. Monitor K+    3. Acute kidney injury. Acute kidney injury probably secondary to  volume depletion in the setting of nausea, vomiting and diarrhea and use  of ACE inhibitor. serum creatinine was 1.4 mg/dL upon presentation with a baseline of 0.9 mg/dL. Serum creatinine is improving. UC NG  PLAN:  1. Continue to monitor    4. Hypertension with chronic kidney disease, stage G1 to G2. The  patient's hypertension  Is at near goal <130/80  PLAN:  1. We will leave the patient off  an ACE inhibitor. 5.  Underlying hepatic insufficiency possibly secondary to cirrhosis. GI workup in progress. PLAN:  1. Defer to Primary Service    6. Chronic kidney disease stage G2 with a baseline serum creatinine of 0.9 mg/dL. 7. L Adrenal mass-size of 4.4cm is concerning for increased risk of malignancy  PLAN:  1.  Check for biochemical activity    Electronically signed by Juancho Santillan MD

## 2021-10-30 NOTE — SIGNIFICANT EVENT
RRT Note  Called for hypoxia and dysarthria. No significant dysarthria when seen. Has been on 5L oxygen throughout the night. Pulse ox 93% on 5L. CTA ordered by primary team but is pending adequate IV access. This was established by RN during RRT and patient to be sent for CTA.      Quinn Ayon MD 2:53 AM 10/30/21

## 2021-10-30 NOTE — PROGRESS NOTES
Moriah's wife, Cindy called unit following phone call with . Wife states she is concerned that her  is unable to use his cell phone to get in touch with her, states she doesn't like being called from numbers she is unfamiliar with by him and is upset her  was assisted in calling her rather than having us notify her first that he would be calling her. Explained to patients wife that the patient requested assistance using hospital phone to reach his wife because his cell phone did not have service. Relayed information and answered questions able within scope of practice, patients wife requested to speak to doctor who ordered testing. I relayed doctor is no longer on shift, but she is able to discuss any further questions I was unable to answer with Dr. Erik Rocha tomorrow and relayed visiting hours.

## 2021-10-30 NOTE — PROGRESS NOTES
Pt increased work of breathing/ sob 85% 2LNC, maintaining 91% spo2 on 5LNC Dr. Nir Hu notified see orders     Bladder scan 146cc -bladder scanner error; unable to print copy of report for soft chart.

## 2021-10-31 LAB
ALBUMIN SERPL-MCNC: 2.7 G/DL (ref 3.5–5.2)
ALP BLD-CCNC: 105 U/L (ref 40–129)
ALT SERPL-CCNC: 22 U/L (ref 0–40)
ANION GAP SERPL CALCULATED.3IONS-SCNC: 9 MMOL/L (ref 7–16)
ANISOCYTOSIS: ABNORMAL
AST SERPL-CCNC: 49 U/L (ref 0–39)
BASOPHILS ABSOLUTE: 0 E9/L (ref 0–0.2)
BASOPHILS RELATIVE PERCENT: 0.7 % (ref 0–2)
BILIRUB SERPL-MCNC: 0.6 MG/DL (ref 0–1.2)
BUN BLDV-MCNC: 11 MG/DL (ref 6–23)
BURR CELLS: ABNORMAL
CALCIUM SERPL-MCNC: 8 MG/DL (ref 8.6–10.2)
CATE U INT: ABNORMAL
CHLORIDE BLD-SCNC: 112 MMOL/L (ref 98–107)
CO2: 24 MMOL/L (ref 22–29)
CORTISOL TOTAL: 16.68 MCG/DL (ref 2.68–18.4)
CREAT SERPL-MCNC: 1.3 MG/DL (ref 0.7–1.2)
DOPAMINE (G CRT): 185 UG/G CRT (ref 0–250)
DOPAMINE 24 HOUR URINE: ABNORMAL UG/D (ref 71–485)
DOPAMINE, (UG/L): 172 UG/L
EOSINOPHILS ABSOLUTE: 0.07 E9/L (ref 0.05–0.5)
EOSINOPHILS RELATIVE PERCENT: 0.9 % (ref 0–6)
EPINEPHRINE (G CRT): 5 UG/G CRT (ref 0–20)
EPINEPHRINE 24 HOUR URINE: ABNORMAL (ref 1–14)
EPINEPHRINE, (UG/L): 5 UG/L
ESTRADIOL LEVEL: 40.7 PG/ML
GFR AFRICAN AMERICAN: >60
GFR NON-AFRICAN AMERICAN: 55 ML/MIN/1.73
GLUCOSE BLD-MCNC: 133 MG/DL (ref 74–99)
HCT VFR BLD CALC: 25.5 % (ref 37–54)
HEMOGLOBIN: 7.9 G/DL (ref 12.5–16.5)
LACTIC ACID: 1.7 MMOL/L (ref 0.5–2.2)
LACTIC ACID: 2.4 MMOL/L (ref 0.5–2.2)
LACTIC ACID: 4.9 MMOL/L (ref 0.5–2.2)
LYMPHOCYTES ABSOLUTE: 1.15 E9/L (ref 1.5–4)
LYMPHOCYTES RELATIVE PERCENT: 13.6 % (ref 20–42)
MAGNESIUM: 1.7 MG/DL (ref 1.6–2.6)
MCH RBC QN AUTO: 34.3 PG (ref 26–35)
MCHC RBC AUTO-ENTMCNC: 31 % (ref 32–34.5)
MCV RBC AUTO: 110.9 FL (ref 80–99.9)
METANEPHRINE INTREP URINE: ABNORMAL
METANEPHRINE UG/G CRE: 113 UG/G CRT (ref 0–300)
METANEPHRINE, UR-PER VOL: 105 UG/L
METANEPHRINES URINE: ABNORMAL UG/D (ref 55–320)
METER GLUCOSE: 113 MG/DL (ref 74–99)
METER GLUCOSE: 137 MG/DL (ref 74–99)
METER GLUCOSE: 139 MG/DL (ref 74–99)
METER GLUCOSE: 162 MG/DL (ref 74–99)
METER GLUCOSE: 215 MG/DL (ref 74–99)
MITOCHONDRIAL M2 AB, IGG: 1.7 U/ML (ref 0–4)
MONOCYTES ABSOLUTE: 0.49 E9/L (ref 0.1–0.95)
MONOCYTES RELATIVE PERCENT: 6.4 % (ref 2–12)
NEUTROPHILS ABSOLUTE: 6.48 E9/L (ref 1.8–7.3)
NEUTROPHILS RELATIVE PERCENT: 79.1 % (ref 43–80)
NOREPINEPH (G CRT): 158 UG/G CRT (ref 0–45)
NOREPINEPHRINE 24 HOUR URINE: ABNORMAL UG/D (ref 14–120)
NOREPINEPHRINE, (UG/L): 147 UG/L
NORMETANEPHRINE 24 HOUR URINE: ABNORMAL UG/D (ref 114–865)
NORMETANEPHRINE, (G/CRT): 924 UG/G CRT (ref 0–400)
NORMETANEPHRINES, NMOL/L: 859 UG/L
NUCLEATED RED BLOOD CELLS: 2.7 /100 WBC
OVALOCYTES: ABNORMAL
PDW BLD-RTO: 21.2 FL (ref 11.5–15)
PLATELET # BLD: 141 E9/L (ref 130–450)
PMV BLD AUTO: 10.3 FL (ref 7–12)
POIKILOCYTES: ABNORMAL
POLYCHROMASIA: ABNORMAL
POTASSIUM SERPL-SCNC: 3.9 MMOL/L (ref 3.5–5)
RBC # BLD: 2.3 E12/L (ref 3.8–5.8)
SODIUM BLD-SCNC: 145 MMOL/L (ref 132–146)
TOTAL PROTEIN: 6.6 G/DL (ref 6.4–8.3)
URINE CULTURE, ROUTINE: NORMAL
WBC # BLD: 8.2 E9/L (ref 4.5–11.5)

## 2021-10-31 PROCEDURE — 82533 TOTAL CORTISOL: CPT

## 2021-10-31 PROCEDURE — 82384 ASSAY THREE CATECHOLAMINES: CPT

## 2021-10-31 PROCEDURE — 82627 DEHYDROEPIANDROSTERONE: CPT

## 2021-10-31 PROCEDURE — 6370000000 HC RX 637 (ALT 250 FOR IP): Performed by: INTERNAL MEDICINE

## 2021-10-31 PROCEDURE — 82962 GLUCOSE BLOOD TEST: CPT

## 2021-10-31 PROCEDURE — 2580000003 HC RX 258: Performed by: SURGERY

## 2021-10-31 PROCEDURE — 6360000002 HC RX W HCPCS: Performed by: INTERNAL MEDICINE

## 2021-10-31 PROCEDURE — 80053 COMPREHEN METABOLIC PANEL: CPT

## 2021-10-31 PROCEDURE — 85025 COMPLETE CBC W/AUTO DIFF WBC: CPT

## 2021-10-31 PROCEDURE — 97530 THERAPEUTIC ACTIVITIES: CPT | Performed by: PHYSICAL THERAPIST

## 2021-10-31 PROCEDURE — 1200000000 HC SEMI PRIVATE

## 2021-10-31 PROCEDURE — 83835 ASSAY OF METANEPHRINES: CPT

## 2021-10-31 PROCEDURE — 94640 AIRWAY INHALATION TREATMENT: CPT

## 2021-10-31 PROCEDURE — 2500000003 HC RX 250 WO HCPCS: Performed by: INTERNAL MEDICINE

## 2021-10-31 PROCEDURE — 82088 ASSAY OF ALDOSTERONE: CPT

## 2021-10-31 PROCEDURE — 82670 ASSAY OF TOTAL ESTRADIOL: CPT

## 2021-10-31 PROCEDURE — C9113 INJ PANTOPRAZOLE SODIUM, VIA: HCPCS | Performed by: INTERNAL MEDICINE

## 2021-10-31 PROCEDURE — 97161 PT EVAL LOW COMPLEX 20 MIN: CPT | Performed by: PHYSICAL THERAPIST

## 2021-10-31 PROCEDURE — 36415 COLL VENOUS BLD VENIPUNCTURE: CPT

## 2021-10-31 PROCEDURE — 84244 ASSAY OF RENIN: CPT

## 2021-10-31 PROCEDURE — 82157 ASSAY OF ANDROSTENEDIONE: CPT

## 2021-10-31 PROCEDURE — 83735 ASSAY OF MAGNESIUM: CPT

## 2021-10-31 PROCEDURE — 2700000000 HC OXYGEN THERAPY PER DAY

## 2021-10-31 PROCEDURE — 83498 ASY HYDROXYPROGESTERONE 17-D: CPT

## 2021-10-31 PROCEDURE — 2580000003 HC RX 258: Performed by: INTERNAL MEDICINE

## 2021-10-31 PROCEDURE — 83605 ASSAY OF LACTIC ACID: CPT

## 2021-10-31 RX ORDER — PANTOPRAZOLE SODIUM 40 MG/1
40 TABLET, DELAYED RELEASE ORAL
Status: DISCONTINUED | OUTPATIENT
Start: 2021-10-31 | End: 2021-11-02 | Stop reason: HOSPADM

## 2021-10-31 RX ADMIN — SODIUM CHLORIDE, PRESERVATIVE FREE 10 ML: 5 INJECTION INTRAVENOUS at 23:09

## 2021-10-31 RX ADMIN — METRONIDAZOLE 500 MG: 500 INJECTION, SOLUTION INTRAVENOUS at 01:24

## 2021-10-31 RX ADMIN — BUDESONIDE 500 MCG: 0.5 INHALANT RESPIRATORY (INHALATION) at 18:17

## 2021-10-31 RX ADMIN — SODIUM CHLORIDE, PRESERVATIVE FREE 10 ML: 5 INJECTION INTRAVENOUS at 10:02

## 2021-10-31 RX ADMIN — LACTULOSE 20 G: 20 SOLUTION ORAL at 15:50

## 2021-10-31 RX ADMIN — SODIUM CHLORIDE 8 MG/HR: 9 INJECTION, SOLUTION INTRAVENOUS at 02:32

## 2021-10-31 RX ADMIN — RIFAXIMIN 550 MG: 550 TABLET ORAL at 10:00

## 2021-10-31 RX ADMIN — WATER 1000 MG: 1 INJECTION INTRAMUSCULAR; INTRAVENOUS; SUBCUTANEOUS at 23:06

## 2021-10-31 RX ADMIN — SODIUM BICARBONATE 650 MG: 648 TABLET ORAL at 23:11

## 2021-10-31 RX ADMIN — SODIUM CHLORIDE, PRESERVATIVE FREE 10 ML: 5 INJECTION INTRAVENOUS at 23:06

## 2021-10-31 RX ADMIN — SODIUM CHLORIDE, PRESERVATIVE FREE 10 ML: 5 INJECTION INTRAVENOUS at 10:03

## 2021-10-31 RX ADMIN — METRONIDAZOLE 500 MG: 500 INJECTION, SOLUTION INTRAVENOUS at 19:08

## 2021-10-31 RX ADMIN — IPRATROPIUM BROMIDE AND ALBUTEROL SULFATE 1 AMPULE: .5; 2.5 SOLUTION RESPIRATORY (INHALATION) at 06:31

## 2021-10-31 RX ADMIN — IPRATROPIUM BROMIDE AND ALBUTEROL SULFATE 1 AMPULE: .5; 2.5 SOLUTION RESPIRATORY (INHALATION) at 18:17

## 2021-10-31 RX ADMIN — ATORVASTATIN CALCIUM 10 MG: 10 TABLET, FILM COATED ORAL at 23:07

## 2021-10-31 RX ADMIN — SODIUM BICARBONATE 650 MG: 648 TABLET ORAL at 10:00

## 2021-10-31 RX ADMIN — Medication 5 MG: at 23:07

## 2021-10-31 RX ADMIN — INSULIN LISPRO 2 UNITS: 100 INJECTION, SOLUTION INTRAVENOUS; SUBCUTANEOUS at 10:05

## 2021-10-31 RX ADMIN — AZATHIOPRINE 75 MG: 50 TABLET ORAL at 10:01

## 2021-10-31 RX ADMIN — BUDESONIDE 500 MCG: 0.5 INHALANT RESPIRATORY (INHALATION) at 06:32

## 2021-10-31 RX ADMIN — INSULIN LISPRO 1 UNITS: 100 INJECTION, SOLUTION INTRAVENOUS; SUBCUTANEOUS at 23:07

## 2021-10-31 RX ADMIN — RIFAXIMIN 550 MG: 550 TABLET ORAL at 23:06

## 2021-10-31 RX ADMIN — LACTULOSE 20 G: 20 SOLUTION ORAL at 23:06

## 2021-10-31 RX ADMIN — IPRATROPIUM BROMIDE AND ALBUTEROL SULFATE 1 AMPULE: .5; 2.5 SOLUTION RESPIRATORY (INHALATION) at 10:21

## 2021-10-31 RX ADMIN — PANTOPRAZOLE SODIUM 40 MG: 40 TABLET, DELAYED RELEASE ORAL at 15:51

## 2021-10-31 RX ADMIN — LACTULOSE 20 G: 20 SOLUTION ORAL at 10:00

## 2021-10-31 RX ADMIN — METOPROLOL SUCCINATE 100 MG: 100 TABLET, EXTENDED RELEASE ORAL at 10:00

## 2021-10-31 RX ADMIN — METRONIDAZOLE 500 MG: 500 INJECTION, SOLUTION INTRAVENOUS at 10:03

## 2021-10-31 RX ADMIN — OCTREOTIDE ACETATE 25 MCG/HR: 500 INJECTION, SOLUTION INTRAVENOUS; SUBCUTANEOUS at 02:32

## 2021-10-31 RX ADMIN — SODIUM BICARBONATE 650 MG: 648 TABLET ORAL at 15:58

## 2021-10-31 ASSESSMENT — PAIN SCALES - GENERAL
PAINLEVEL_OUTOF10: 0
PAINLEVEL_OUTOF10: 0

## 2021-10-31 NOTE — PLAN OF CARE
Problem: Falls - Risk of:  Goal: Will remain free from falls  Description: Will remain free from falls  10/31/2021 0059 by Alan Garcia RN  Outcome: Met This Shift     Problem: Falls - Risk of:  Goal: Absence of physical injury  Description: Absence of physical injury  10/31/2021 0059 by Alan Garcia RN  Outcome: Met This Shift

## 2021-10-31 NOTE — PLAN OF CARE
Problem: Falls - Risk of:  Goal: Will remain free from falls  Description: Will remain free from falls  10/31/2021 1425 by David Pruitt RN  Outcome: Ongoing  10/31/2021 0059 by Mejia Nevarez RN  Outcome: Met This Shift  Goal: Absence of physical injury  Description: Absence of physical injury  10/31/2021 1425 by David Pruitt RN  Outcome: Ongoing  10/31/2021 0059 by Mejia Nevarez RN  Outcome: Met This Shift     Problem:  Bowel/Gastric:  Goal: Occurrences of nausea will decrease  Description: Occurrences of nausea will decrease  Outcome: Ongoing  Goal: Ability to achieve a regular elimination pattern will improve  Description: Ability to achieve a regular elimination pattern will improve  Outcome: Ongoing

## 2021-10-31 NOTE — PROGRESS NOTES
PROGRESS NOTE    By Rikki Mast D.O. [facility]  Dr. Lalito Paige MD, Dr. Jimbo Petit MD, Dr Theo Thibodeaux, Dr. Sherry Lawson MD, Dr. Rikki Mast, DO [facility]  73 y.o.  male    SUBJECTIVE: Patient seen and examined. No signs of overt bleeding. Hgb stable. Tolerating diet. OBJECTIVE:    BP (!) 112/57   Pulse 98   Temp 98.1 °F (36.7 °C) (Oral)   Resp 16   SpO2 93%     Gen: NAD, AAO x 3  HEENT:PEERL, no icterus  Heart: RRR, no M/R/G  Lungs: CTAB  Abd.: soft, NT, ND, BS +, no G/R, no HSM  Extr.: no C/C/E, no bruising         Lab Results   Component Value Date    WBC 8.2 10/31/2021    WBC 6.0 10/30/2021    WBC 5.9 10/29/2021    HGB 7.9 10/31/2021    HGB 7.3 10/30/2021    HGB 7.9 10/30/2021    HCT 25.5 10/31/2021    .9 10/31/2021    RDW 21.2 10/31/2021     10/31/2021     10/30/2021     10/29/2021     Lab Results   Component Value Date     10/31/2021    K 3.9 10/31/2021    K 4.6 01/17/2020     10/31/2021    CO2 24 10/31/2021    BUN 11 10/31/2021    CREATININE 1.3 10/31/2021    CALCIUM 8.0 10/31/2021    PROT 6.6 10/31/2021    LABALBU 2.7 10/31/2021    LABALBU 4.3 07/31/2011    BILITOT 0.6 10/31/2021    BILITOT 0.5 10/30/2021    BILITOT 0.6 10/29/2021    ALKPHOS 105 10/31/2021    ALKPHOS 101 10/30/2021    ALKPHOS 87 10/29/2021    AST 49 10/31/2021    AST 64 10/30/2021    AST 61 10/29/2021    ALT 22 10/31/2021    ALT 26 10/30/2021    ALT 25 10/29/2021     Lab Results   Component Value Date    LIPASE 50 10/29/2021    LIPASE 86 10/26/2021     No results found for: AMYLASE      ASSESSMENT/PLAN:  1.  Acute blood loss anemia secondary to gastric variceal bleed - stable, no overt bleeding  -Patient is status post EGD with findings of large esophageal bleed as well as gastric fibrin clot.  Patient was also found to have portal hypertensive gastropathy. - D/C PPI infusion as well as octreotide drip.   - Protonix 40 mg IV BID     -Type and cross, please keep 2 units hold at all times.  -Recommend holding Plavix  -Continue supportive care.    -Monitor H&H and transfuse per primary team.      2. Hepatic encephalopathy, Grade 1  -Patient was initiated on lactulose and rifaximin   -Continue to monitor mental status and titrate lactulose to 2-3 bowel movement per day.     3. 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  Patient has been drinking heavily for the past 3-4 years.  -Patient denies any history of IV drug use or viral hepatitis.     4. Adrenal mass  -Per general surgery and urology        Patient will most likely benefit from a TIPS evaluation, which can be done as an outpatient.        Adams Rodriguez DO  10/31/2021  11:29 AM

## 2021-10-31 NOTE — PROGRESS NOTES
NEPHROLOGY Attending   Progress Note    Subjective:   Admit Date: 10/26/2021  PCP: Ag Perdomo DO    Interval History:     10/31/21:Pt last PM became agitated after his L arm IV access infiltrated, now has a telesitter with him        Diet: ADULT DIET; Full Liquid    Data:   Scheduled Meds:   pantoprazole  40 mg Oral BID AC    [Held by provider] furosemide  20 mg IntraVENous Once    metoprolol succinate  100 mg Oral Daily    budesonide  500 mcg Nebulization BID    sodium bicarbonate  650 mg Oral TID    lactulose  20 g Oral TID    rifaximin  550 mg Oral BID    azaTHIOprine  75 mg Oral Daily    atorvastatin  10 mg Oral Nightly    sodium chloride flush  5-40 mL IntraVENous 2 times per day    insulin lispro  0-6 Units SubCUTAneous Q4H    cefTRIAXone (ROCEPHIN) IV  1,000 mg IntraVENous Q24H    metroNIDAZOLE  500 mg IntraVENous Q8H    melatonin  5 mg Oral Nightly    ipratropium-albuterol  1 ampule Inhalation Q4H While awake    sodium chloride flush  5-40 mL IntraVENous 2 times per day    influenza virus vaccine  0.5 mL IntraMUSCular Prior to discharge     Continuous Infusions:   sodium chloride      dextrose      sodium chloride       PRN Meds:ipratropium-albuterol, [Held by provider] traMADol, glucose, sodium chloride flush, sodium chloride, polyethylene glycol, prochlorperazine, glucose, dextrose, glucagon (rDNA), dextrose, sodium chloride flush, sodium chloride    Intake/Output Summary (Last 24 hours) at 10/31/2021 1251  Last data filed at 10/31/2021 0851  Gross per 24 hour   Intake 640 ml   Output --   Net 640 ml     CBC:   Recent Labs     10/29/21  0532 10/29/21  1212 10/30/21  0500 10/30/21  1141 10/31/21  0444   WBC 5.9  --  6.0  --  8.2   HGB 7.7*   < > 7.9* 7.3* 7.9*     --  126*  --  141    < > = values in this interval not displayed.      BMP:    Recent Labs     10/29/21  0532 10/30/21  0500 10/31/21  0444   * 146 145   K 4.4 4.3 3.9   * 117* 112*   CO2 20* 18* 24   BUN 21 14 11   CREATININE 1.1 1.1 1.3*   GLUCOSE 161* 132* 133*     Hepatic:   Recent Labs     10/29/21  0532 10/30/21  0500 10/31/21  0444   AST 61* 64* 49*   ALT 25 26 22   BILITOT 0.6 0.5 0.6   ALKPHOS 87 101 105     Troponin: No results for input(s): TROPONINI in the last 72 hours. BNP: No results for input(s): BNP in the last 72 hours. Lipids: No results for input(s): CHOL, HDL in the last 72 hours. Invalid input(s): LDLCALCU  ABGs: No results found for: PHART, PO2ART, VCD2IRJ  INR:   Recent Labs     10/30/21  0500   INR 1.1     -----------------------------------------------------------------  RAD: CT ABDOMEN PELVIS W IV CONTRAST Additional Contrast? None    Result Date: 10/26/2021  EXAMINATION: CT OF THE ABDOMEN AND PELVIS WITH CONTRAST 10/26/2021 7:59 pm TECHNIQUE: CT of the abdomen and pelvis was performed with the administration of intravenous contrast. Multiplanar reformatted images are provided for review. Dose modulation, iterative reconstruction, and/or weight based adjustment of the mA/kV was utilized to reduce the radiation dose to as low as reasonably achievable. COMPARISON: None. HISTORY: ORDERING SYSTEM PROVIDED HISTORY: uppergi bleed, hematemesis TECHNOLOGIST PROVIDED HISTORY: Additional Contrast?->None Reason for exam:->uppergi bleed, hematemesis Decision Support Exception - unselect if not a suspected or confirmed emergency medical condition->Emergency Medical Condition (MA) FINDINGS: Lower Chest: Chronic interstitial changes in the lung bases with small calcified granuloma. No evidence of airspace consolidation or pleural effusions. Organs: Mildly lobulated liver with mild hypertrophy of the left lobe suggest cirrhosis. Perihepatic fluid. Splenic granuloma. There is a heterogeneous mass arising from the left adrenal gland with foci of macroscopic fat, small calcifications and large soft tissue component. The presence of macroscopic fat is characteristic for myelolipoma.   The mass measures approximately 4.7 cm in diameter. Given the size of the lesion surgical consultation is recommended. Consider biochemical lab evaluation for functional status and pheochromocytoma prior to resection. There are enlarged and tortuous portosystemic collaterals which may reflect portal hypertension. Prominent perigastric varices. Bilateral renal cysts, no obstructive uropathy. Partially contracted gallbladder with questionable mild wall thickening and pericholecystic stranding. Consider further evaluation with gallbladder ultrasound. GI/Bowel: No evidence of bowel obstruction or free intraperitoneal air. No colitis or diverticulitis. Multiple diverticula along the sigmoid colon. There is mild pericolonic stranding along the left pelvic sidewall with trace loculated fluid in the left side of the pelvis adjacent to the sigmoid colon. This may reflect sequela from acute diverticulitis. Clinical correlation recommended. Pelvis: Urinary bladder incompletely distended. Mild thickening of the urinary bladder wall and perivesical stranding which may reflect sequela from cystitis or chronic outlet obstruction. Peritoneum/Retroperitoneum: No abdominal aortic aneurysm or retroperitoneal adenopathy. Bones/Soft Tissues: No acute bony pathology. Mildly lobulated liver with mild hypertrophy of the left lobe which may represent cirrhosis. Small volume perihepatic fluid. 4.7 cm left adrenal mass with macroscopic fat characteristic for myelolipoma. Given the size of the lesion, surgical consultation is recommended. Consider biochemical lab evaluation for functional status and pheochromocytoma prior to resection. Enlarged and tortuous portosystemic collaterals and prominent Kassandra gastric varices likely reflecting portal hypertension. Partially contracted gallbladder with questionable mild wall thickening and pericholecystic stranding. Consider further evaluation with gallbladder ultrasound.  Sigmoid diverticulosis with mild pericolonic stranding along the left pelvic sidewall with trace loculated fluid in the left side of the pelvis which may reflect sequela from acute diverticulitis. No free intraperitoneal air. Clinical correlation recommended. Mild thickening of the urinary bladder wall and perivesical stranding which may reflect sequela from cystitis or chronic outlet obstruction. US GALLBLADDER RUQ    Result Date: 10/27/2021  EXAMINATION: RIGHT UPPER QUADRANT ULTRASOUND 10/27/2021 8:09 am COMPARISON: 12/22/2006 HISTORY: ORDERING SYSTEM PROVIDED HISTORY: abd pain TECHNOLOGIST PROVIDED HISTORY: Reason for exam:->abd pain What reading provider will be dictating this exam?->CRC FINDINGS: LIVER:  Liver demonstrates heterogeneous echotexture with a mildly nodular contour but no focal mass. Findings suggest cirrhosis. Portal vein appears to have a normal direction of flow and is patent. BILIARY SYSTEM:  Gallbladder appears normal in size with minimal dependent sludge. No tenderness. Gallbladder wall is 3 mm. No pericholecystic fluid. No shadowing calculi. Common bile duct is within normal limits measuring 2 mm. RIGHT KIDNEY: Right kidney demonstrates cortical thinning, normal renal cortical echogenicity. No hydronephrosis mass or calculus. Right kidney measures 11.1 x 5.8 x 6.3 cm PANCREAS:  Visualized portions of the pancreas are unremarkable. OTHER: No evidence of right upper quadrant ascites. No evidence of acute cholecystitis. Minimal gallbladder sludge. Liver has an abnormal echotexture suggesting cirrhosis. No ascites. XR CHEST PORTABLE    Result Date: 10/27/2021  EXAMINATION: ONE XRAY VIEW OF THE CHEST 10/27/2021 6:08 am COMPARISON: 10/26/2021 HISTORY: ORDERING SYSTEM PROVIDED HISTORY: hypoxia TECHNOLOGIST PROVIDED HISTORY: Reason for exam:->hypoxia FINDINGS: The lungs are without acute focal process. There is no effusion or pneumothorax. The cardiomediastinal silhouette is without acute process.  The osseous structures are without acute process. There are few scattered punctate benign less than 5 mm calcified granulomas within the right and left lungs. These findings are stable. No acute process. XR CHEST PORTABLE    Result Date: 10/26/2021  EXAMINATION: ONE XRAY VIEW OF THE CHEST 10/26/2021 8:40 pm COMPARISON: 01/17/2020 HISTORY: ORDERING SYSTEM PROVIDED HISTORY: upper gi bleed TECHNOLOGIST PROVIDED HISTORY: Reason for exam:->upper gi bleed FINDINGS: There is no cardiomegaly. There is no vascular congestion. There are multiple unchanged calcified granulomata bilaterally. Remaining lungs are clear. There is no pleural effusion or pneumothorax. No acute abnormality identified. EXAMINATION:   CTA OF THE CHEST 10/30/2021 3:19 am       TECHNIQUE:   CTA of the chest was performed after the administration of intravenous   contrast.  Multiplanar reformatted images are provided for review.  MIP   images are provided for review. Dose modulation, iterative reconstruction,   and/or weight based adjustment of the mA/kV was utilized to reduce the   radiation dose to as low as reasonably achievable.       COMPARISON:   None.       HISTORY:   ORDERING SYSTEM PROVIDED HISTORY: evalaute for PE   TECHNOLOGIST PROVIDED HISTORY:   Reason for exam:->evalaute for PE       FINDINGS:   Pulmonary Arteries: Pulmonary arteries are adequately opacified for   evaluation.  No evidence of intraluminal filling defect to suggest pulmonary   embolism.  Main pulmonary artery is normal in caliber.       Mediastinum: No evidence of mediastinal lymphadenopathy.  The heart and   pericardium demonstrate no acute abnormality.  There is no acute abnormality   of the thoracic aorta.       Lungs/pleura: Several scattered calcified granulomata are noted in the lungs.    Mild atelectasis at the left lower lung.  The lungs are without acute   process.  No focal consolidation or pulmonary edema.  No evidence of pleural   effusion or pneumothorax.       Upper Abdomen: There is a complex 4.4 cm left adrenal mass.  Recommend   further evaluation with MRI.  Multiple calcified granulomata are noted in the   spleen.  The remainder of the visualized portions of the upper abdomen is   within normal limits.       Soft Tissues/Bones: No acute bone or soft tissue abnormality.           Impression   No evidence of pulmonary embolism or acute pulmonary abnormality.       Complex 4.4 cm left adrenal mass. Recommend further evaluation with MRI. Multiple calcified granulomata are noted in the spleen.               Objective:   Vitals:   Vitals:    10/31/21 1022   BP:    Pulse:    Resp: 16   Temp:    SpO2: 93%     Patient Vitals for the past 24 hrs:   BP Temp Temp src Pulse Resp SpO2   10/31/21 1022 -- -- -- -- 16 93 %   10/31/21 0930 (!) 112/57 98.1 °F (36.7 °C) Oral 98 16 96 %   10/31/21 0634 -- -- -- -- 15 90 %   10/31/21 0633 -- -- -- -- 15 (!) 87 %   10/30/21 2030 127/60 98.9 °F (37.2 °C) Oral 102 20 91 %     General appearance: alert, appears stated age and cooperative  Skin: Skin color, texture, turgor normal. No rashes or lesions  HEENT: Head: Normocephalic, no lesions, without obvious abnormality. Neck: no adenopathy, no carotid bruit, no JVD, supple, symmetrical, trachea midline and thyroid not enlarged, symmetric, no tenderness/mass/nodules  Lungs: CTA  Heart: regular rate and rhythm, S1, S2 normal, no murmur, click, rub or gallop  Abdomen: soft, non-tender; bowel sounds normal; no masses,  no organomegaly  Extremities: extremities normal, atraumatic, no cyanosis or edema  Neurologic: Mental status: Alert, oriented, thought content appropriate      Assessment/Plan:   1.   Lactic acidosis-with a lactic acid of 11 mmol/L upon presentation due to the use of metformin in the setting of acute kidney injury - subsequently, lactic acid has improved and was 2.2 this AM and has risen to 2.6 then up to 5.3 and this AM back down to 1.7  PLAN:   1. follow serial bicarbonate levels and lactic acid levles  2. Continue oral supplemental bicarbonate. 3.  Keep the patient off metformin. 2.  Hyperkalemia. Hyperkalemia is probably reflection of acute kidney  injury and the use of ACE inhibitor. Serum potassium was 6.9 mmol/L on presentation 10/26/21  K+ WNL  PLAN:  1. Monitor K+    3. Acute kidney injury. Acute kidney injury probably secondary to  volume depletion in the setting of nausea, vomiting and diarrhea and use  of ACE inhibitor. serum creatinine was 1.4 mg/dL upon presentation with a baseline of 0.9 mg/dL. Serum creatinine carlie 1.1mg/dl and now up to 1.3mg/dl  UC NG  PLAN:  1. Continue to monitor    4. Hypertension with chronic kidney disease, stage G1 to G2. The  patient's hypertension  Is at goal <130/80  PLAN:  1. We will leave the patient off  an ACE inhibitor. 5.  Underlying hepatic insufficiency possibly secondary to cirrhosis. GI workup in progress. PLAN:  1. Defer to Primary Service    6. Chronic kidney disease stage G2 with a baseline serum creatinine of 0.9 mg/dL. 7. L Adrenal mass-size of 4.4cm is concerning for increased risk of malignancy  PLAN:  1.  Await  biochemical workup    Electronically signed by Angelina Sharpe MD

## 2021-10-31 NOTE — PROGRESS NOTES
denies any pain. He also denies any unusual shortness of breath. BUN/creatinine 21/1.1 with serum sodium 147. Blood sugars ranging 161-217. Hemoglobin is down to 7.7 today and was high as 8.5 late yesterday morning. WBC and platelet count are stable. Temperature is 98.4 with heart rate 120 with blood pressure 141/56. O2 sat 91% on room air at rest.  Give 5 mg IV push of metoprolol and resume Toprol-XL. 10/30/2021  Patient seen examined on telemetry floor. Patient apparently is more short of breath earlier a.m. and CTA lungs were ordered and was no evidence of PE and no focal consolidation or pulmonary edema noted. There was a complex 4.4 cm left adrenal mass noted. Patient states he is feeling better currently and denies any problem with any chest pain. Patient's wife is at the bedside and case discussed. BUN/creatinine 14/1.1 with blood sugars ranging 132-295. Hemoglobin ranges 7.3-7.9. Temperature 99.2 with patient's heart rate 124 still blood pressure 123/53 with O2 sat 94% on 3 L nasal cannula. Patient did have a history of smoking about 30 years and smoked 1-2 packs a day. 10/31/2021  Patient seen examined on telemetry floor. Patient's wife is at the bedside. Patient complaining of left arm pain with increase edema noted most likely from IV infiltration with area of ecchymosis. Patient denies any problem with any chest pain. Patient is requesting to go home. BUN/creatinine 11/1.3. Blood sugars are ranging 139-295. Lactic acid down to 1.7 this morning but was 5.3 late yesterday afternoon. Hemoglobin 7.9 with hemoglobin 8.2. Temperature 98.1 with heart rate 98 blood pressure 112/57. O2 sat 93% on 4 L nasal cannula.         PAST MEDICAL Hx:  Past Medical History:   Diagnosis Date    Diabetes mellitus ([facility] [address]     Hepatitis C antibody test positive     Hyperlipidemia     Hypertension     MI, old        PAST SURGICAL Hx:   Past Surgical History:   Procedure Laterality Date    COLONOSCOPY  2020    CORONARY ANGIOPLASTY WITH STENT PLACEMENT      UPPER GASTROINTESTINAL ENDOSCOPY N/A 1/19/2020    [facility] N/A 10/27/2021    EGD ESOPHAGOGASTRODUODENOSCOPY performed by Lula Alex MD at [facility] Hx:  History reviewed. No pertinent family history. HOME MEDICATIONS:  Prior to Admission medications    Medication Sig Start Date End Date Taking? Authorizing Provider   cyanocobalamin ([facility] VITAMIN B12) 1000 MCG tablet Take 1 tablet by mouth daily 9/17/21  Yes Bonifacio Guerrero MD   pyridoxine (RA VITAMIN B-6) 50 MG tablet Take 1 tablet by mouth daily 9/17/21  Yes Bonifacio Guerrero MD   folic acid (FOLVITE) 1 MG tablet Take 1 tablet by mouth daily 9/17/21  Yes Bonifacio Guerrero MD   ferrous sulfate (IRON 325) 325 (65 Fe) MG tablet Take 1 tablet by mouth 2 times daily 9/17/21  Yes Bonifacio Guerrero MD   aspirin 81 MG chewable tablet Take 81 mg by mouth daily   Yes Historical Provider, MD   glipiZIDE (GLUCOTROL) 10 MG tablet Take 10 mg by mouth daily    Yes Historical Provider, MD   amLODIPine (NORVASC) 10 MG tablet Take 10 mg by mouth daily   Yes Historical Provider, MD   enalapril (VASOTEC) 5 MG tablet Take 5 mg by mouth daily   Yes Historical Provider, MD   metoprolol succinate (TOPROL XL) 100 MG extended release tablet Take 100 mg by mouth daily   Yes Historical Provider, MD   melatonin 5 MG TABS tablet Take 5 mg by mouth nightly   Yes Historical Provider, MD   Multiple Vitamin (MULTI-VITAMIN DAILY PO) Take 1 tablet by mouth daily    Yes Historical Provider, MD   Coenzyme Q10 (COQ-10) 100 MG CAPS Take 100 mg by mouth nightly    Yes Historical Provider, MD   metformin (GLUCOPHAGE) 500 MG tablet Take 1,000 mg by mouth 2 times daily (with meals)    Yes Historical Provider, MD   omeprazole (PRILOSEC) 20 MG capsule Take 20 mg by mouth daily.      Yes Historical Provider, MD   azaTHIOprine (IMURAN) 50 MG tablet Take 50 mg by mouth daily. Yes Historical Provider, MD   clopidogrel (PLAVIX) 75 MG tablet Take 75 mg by mouth daily. Yes Historical Provider, MD   atorvastatin (LIPITOR) 10 MG tablet Take 10 mg by mouth nightly    Yes Historical Provider, MD       ALLERGIES:  Codeine    SOCIAL Hx:  Social History     Socioeconomic History    Marital status:      Spouse name: Not on file    Number of children: Not on file    Years of education: Not on file    Highest education level: Not on file   Occupational History    Not on file   Tobacco Use    Smoking status: Former Smoker     Packs/day: 1.00     Quit date: 2006     Years since quitting: 15.8    Smokeless tobacco: Never Used   Substance and Sexual Activity    Alcohol use: Yes     Comment: occasional    Drug use: Not Currently     Types: Marijuana    Sexual activity: Yes     Partners: Female   Other Topics Concern    Not on file   Social History Narrative    Not on file     Social Determinants of Health     Financial Resource Strain:     Difficulty of Paying Living Expenses:    Food Insecurity:     Worried About Running Out of Food in the Last Year:     [address] in the Last Year:    Transportation Needs:     Lack of Transportation (Medical):      Lack of Transportation (Non-Medical):    Physical Activity:     Days of Exercise per Week:     Minutes of Exercise per Session:    Stress:     Feeling of Stress :    Social Connections:     Frequency of Communication with Friends and Family:     Frequency of Social Gatherings with Friends and Family:     Attends Oriental orthodox Services:     Active Member of Clubs or Organizations:     Attends Club or Organization Meetings:     Marital Status:    Intimate Partner Violence:     Fear of Current or Ex-Partner:     Emotionally Abused:     Physically Abused:     Sexually Abused:        ROS: Positive in bold  General:   Denies chills, fatigue, fever, malaise, night sweats or weight loss    Psychological:   Denies anxiety, disorientation or hallucinations    ENT:    Denies epistaxis, headaches, vertigo or visual changes    Cardiovascular:   Denies any chest pain, irregular heartbeats, or palpitations. No paroxysmal nocturnal dyspnea. Respiratory:   Denies shortness of breath, coughing, sputum production, hemoptysis, or wheezing. No orthopnea. Gastrointestinal:   Denies nausea, vomiting, diarrhea, or constipation. Denies any abdominal pain. Denies change in bowel habits or stools. Genito-Urinary:    Denies any urgency, frequency, hematuria. Voiding without difficulty. Musculoskeletal:   Denies joint pain, joint stiffness, joint swelling or muscle pain    Neurology:    Denies any headache or focal neurological deficits. No weakness or paresthesia. Derm:    Denies any rashes, ulcers, or excoriations. Denies bruising. Extremities:   Denies any lower extremity swelling or edema. PHYSICAL EXAM: Abnormal findings noted  VITALS:  Vitals:    10/31/21 1022   BP:    Pulse:    Resp: 16   Temp:    SpO2: 93%         CONSTITUTIONAL:    Awake, alert, cooperative, no apparent distress, and appears stated age    EYES:     EOMI, sclera clear, conjunctiva normal    ENT:    Normocephalic, atraumatic,  External ears without lesions. NECK:    Supple, symmetrical, trachea midline, , no JVD    HEMATOLOGIC/LYMPHATICS:    No cervical lymphadenopathy and no supraclavicular lymphadenopathy    LUNGS:    Symmetric. Coarse decreased breath sounds to auscultation bilaterally    CARDIOVASCULAR:    Normal apical impulse, regular rate and rhythm, normal S1 and S2, no S3 or S4, and no murmur noted    ABDOMEN:     soft, mildly distended, +tender    MUSCULOSKELETAL:    There is no redness, warmth, or swelling of the joints. NEUROLOGIC:    Awake, alert, oriented to name, place and time. SKIN:    No bruising or bleeding.   No redness, warmth, or swelling    EXTREMITIES:    Peripheral pulses present.  + Right arm edema and trace lower leg edema,no cyanosis    LINES/CATHETERS     LABORATORY DATA:  CBC with Differential:    Lab Results   Component Value Date    WBC 8.2 10/31/2021    RBC 2.30 10/31/2021    HGB 7.9 10/31/2021    HCT 25.5 10/31/2021     10/31/2021    .9 10/31/2021    MCH 34.3 10/31/2021    MCHC 31.0 10/31/2021    RDW 21.2 10/31/2021    NRBC 2.7 10/31/2021    SEGSPCT 69 08/25/2013    BANDSPCT 2 03/24/2016    METASPCT 0.9 09/16/2021    LYMPHOPCT 13.6 10/31/2021    MONOPCT 6.4 10/31/2021    BASOPCT 0.7 10/31/2021    MONOSABS 0.49 10/31/2021    LYMPHSABS 1.15 10/31/2021    EOSABS 0.07 10/31/2021    BASOSABS 0.00 10/31/2021     CMP:    Lab Results   Component Value Date     10/31/2021    K 3.9 10/31/2021    K 4.6 01/17/2020     10/31/2021    CO2 24 10/31/2021    BUN 11 10/31/2021    CREATININE 1.3 10/31/2021    GFRAA >60 10/31/2021    LABGLOM 55 10/31/2021    GLUCOSE 133 10/31/2021    GLUCOSE 136 07/31/2011    PROT 6.6 10/31/2021    LABALBU 2.7 10/31/2021    LABALBU 4.3 07/31/2011    CALCIUM 8.0 10/31/2021    BILITOT 0.6 10/31/2021    ALKPHOS 105 10/31/2021    AST 49 10/31/2021    ALT 22 10/31/2021       ASSESSMENT/PLAN:  1. Acute on chronic anemia secondary to GI bleed from gastric varices  2. Acute hypoxic respiratory failure  3. Large esophageal varices without bleeding  4. Hyperkalemia-resolved  5. Persistent lactic acidosis  6. CT findings suggestive of hepatic cirrhosis  7. 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 Sigmoid diverticulosis with mild pericolonic stranding with trace loculated fluid in left pelvis  9. History of autoimmune hepatitis  10. Coronary artery disease with stents in place  11. Hypertension  12. Hyperlipidemia  13. Non-insulin-dependent diabetes mellitus-hemoglobin A1c 4.8  14.  History of prior tobacco abuse-about 50-pack-year history and has acute exacerbation of underlying COPD    Patient presented with abdominal pain and hematemesis. Patient found to have a drop in H&H. Patient started on Protonix drip. Patient placed on Protonix twice daily. Patient patient started on Rocephin and Flagyl in the setting of CT findings. General surgery consulted. GI consulted. Patient will be transfused 1 unit PRBC. On initial examination patient was not hypoxic. However he developed hypoxia. Home medications reviewed  Case discussed with general surgery and GI. Glucoscans 4 times daily with sliding scale insulin  EGD 10/28-large esophageal varices, gastric varices with fibrin clot, portal hypertensive gastropathy    GI is now contemplating transfer to [place] outpatient. Discharge home when okay with GI and nephrology    Consult  for home O2    CMP, CBC lactic acid in a.m.     Carnella Gitelman, DO  11:42 AM  10/31/2021

## 2021-11-01 ENCOUNTER — APPOINTMENT (OUTPATIENT)
Dept: ULTRASOUND IMAGING | Age: 71
DRG: 299 | End: 2021-11-01
Payer: MEDICARE

## 2021-11-01 LAB
ALBUMIN SERPL-MCNC: 2.5 G/DL (ref 3.5–5.2)
ALP BLD-CCNC: 92 U/L (ref 40–129)
ALPHA-1 ANTITRYPSIN: 181 MG/DL (ref 90–200)
ALT SERPL-CCNC: 20 U/L (ref 0–40)
ANION GAP SERPL CALCULATED.3IONS-SCNC: 11 MMOL/L (ref 7–16)
ANISOCYTOSIS: ABNORMAL
AST SERPL-CCNC: 47 U/L (ref 0–39)
BASOPHILS ABSOLUTE: 0 E9/L (ref 0–0.2)
BASOPHILS RELATIVE PERCENT: 0.8 % (ref 0–2)
BILIRUB SERPL-MCNC: 0.5 MG/DL (ref 0–1.2)
BLOOD CULTURE, ROUTINE: NORMAL
BUN BLDV-MCNC: 10 MG/DL (ref 6–23)
BURR CELLS: ABNORMAL
CALCIUM SERPL-MCNC: 8.1 MG/DL (ref 8.6–10.2)
CHLORIDE BLD-SCNC: 111 MMOL/L (ref 98–107)
CO2: 22 MMOL/L (ref 22–29)
CREAT SERPL-MCNC: 1.3 MG/DL (ref 0.7–1.2)
CULTURE, BLOOD 2: NORMAL
EKG ATRIAL RATE: 131 BPM
EKG P AXIS: 40 DEGREES
EKG P-R INTERVAL: 128 MS
EKG Q-T INTERVAL: 274 MS
EKG QRS DURATION: 76 MS
EKG QTC CALCULATION (BAZETT): 404 MS
EKG R AXIS: 57 DEGREES
EKG T AXIS: 173 DEGREES
EKG VENTRICULAR RATE: 131 BPM
EOSINOPHILS ABSOLUTE: 0.19 E9/L (ref 0.05–0.5)
EOSINOPHILS RELATIVE PERCENT: 2.6 % (ref 0–6)
GFR AFRICAN AMERICAN: >60
GFR NON-AFRICAN AMERICAN: 55 ML/MIN/1.73
GLUCOSE BLD-MCNC: 119 MG/DL (ref 74–99)
HCT VFR BLD CALC: 24.2 % (ref 37–54)
HEMOGLOBIN: 7.5 G/DL (ref 12.5–16.5)
HYPOCHROMIA: ABNORMAL
INR BLD: 1.4
LACTIC ACID: 1.7 MMOL/L (ref 0.5–2.2)
LACTIC ACID: 1.8 MMOL/L (ref 0.5–2.2)
LACTIC ACID: 3 MMOL/L (ref 0.5–2.2)
LACTIC ACID: 3.5 MMOL/L (ref 0.5–2.2)
LYMPHOCYTES ABSOLUTE: 0.67 E9/L (ref 1.5–4)
LYMPHOCYTES RELATIVE PERCENT: 8.8 % (ref 20–42)
MCH RBC QN AUTO: 33.8 PG (ref 26–35)
MCHC RBC AUTO-ENTMCNC: 31 % (ref 32–34.5)
MCV RBC AUTO: 109 FL (ref 80–99.9)
METER GLUCOSE: 117 MG/DL (ref 74–99)
METER GLUCOSE: 122 MG/DL (ref 74–99)
METER GLUCOSE: 125 MG/DL (ref 74–99)
METER GLUCOSE: 130 MG/DL (ref 74–99)
METER GLUCOSE: 138 MG/DL (ref 74–99)
METER GLUCOSE: 138 MG/DL (ref 74–99)
MONOCYTES ABSOLUTE: 0.81 E9/L (ref 0.1–0.95)
MONOCYTES RELATIVE PERCENT: 10.5 % (ref 2–12)
NEUTROPHILS ABSOLUTE: 5.77 E9/L (ref 1.8–7.3)
NEUTROPHILS RELATIVE PERCENT: 78.1 % (ref 43–80)
OVALOCYTES: ABNORMAL
PDW BLD-RTO: 20.4 FL (ref 11.5–15)
PLATELET # BLD: 133 E9/L (ref 130–450)
PMV BLD AUTO: 11.1 FL (ref 7–12)
POIKILOCYTES: ABNORMAL
POLYCHROMASIA: ABNORMAL
POTASSIUM SERPL-SCNC: 3.6 MMOL/L (ref 3.5–5)
PROTHROMBIN TIME: 16.6 SEC (ref 9.3–12.4)
RBC # BLD: 2.22 E12/L (ref 3.8–5.8)
SCHISTOCYTES: ABNORMAL
SODIUM BLD-SCNC: 144 MMOL/L (ref 132–146)
TARGET CELLS: ABNORMAL
TOTAL PROTEIN: 6.2 G/DL (ref 6.4–8.3)
WBC # BLD: 7.4 E9/L (ref 4.5–11.5)

## 2021-11-01 PROCEDURE — 6370000000 HC RX 637 (ALT 250 FOR IP): Performed by: INTERNAL MEDICINE

## 2021-11-01 PROCEDURE — 93976 VASCULAR STUDY: CPT

## 2021-11-01 PROCEDURE — 2500000003 HC RX 250 WO HCPCS: Performed by: INTERNAL MEDICINE

## 2021-11-01 PROCEDURE — 93010 ELECTROCARDIOGRAM REPORT: CPT | Performed by: INTERNAL MEDICINE

## 2021-11-01 PROCEDURE — 2580000003 HC RX 258: Performed by: INTERNAL MEDICINE

## 2021-11-01 PROCEDURE — 85025 COMPLETE CBC W/AUTO DIFF WBC: CPT

## 2021-11-01 PROCEDURE — 6360000002 HC RX W HCPCS: Performed by: INTERNAL MEDICINE

## 2021-11-01 PROCEDURE — 97530 THERAPEUTIC ACTIVITIES: CPT

## 2021-11-01 PROCEDURE — 82962 GLUCOSE BLOOD TEST: CPT

## 2021-11-01 PROCEDURE — 1200000000 HC SEMI PRIVATE

## 2021-11-01 PROCEDURE — 2700000000 HC OXYGEN THERAPY PER DAY

## 2021-11-01 PROCEDURE — 85610 PROTHROMBIN TIME: CPT

## 2021-11-01 PROCEDURE — 94640 AIRWAY INHALATION TREATMENT: CPT

## 2021-11-01 PROCEDURE — 2580000003 HC RX 258: Performed by: SURGERY

## 2021-11-01 PROCEDURE — 36415 COLL VENOUS BLD VENIPUNCTURE: CPT

## 2021-11-01 PROCEDURE — 83605 ASSAY OF LACTIC ACID: CPT

## 2021-11-01 PROCEDURE — 80053 COMPREHEN METABOLIC PANEL: CPT

## 2021-11-01 PROCEDURE — 97116 GAIT TRAINING THERAPY: CPT

## 2021-11-01 RX ORDER — SODIUM CHLORIDE 9 MG/ML
INJECTION, SOLUTION INTRAVENOUS CONTINUOUS
Status: DISCONTINUED | OUTPATIENT
Start: 2021-11-01 | End: 2021-11-02

## 2021-11-01 RX ORDER — LACTULOSE 10 G/15ML
30 SOLUTION ORAL 3 TIMES DAILY
Status: DISCONTINUED | OUTPATIENT
Start: 2021-11-01 | End: 2021-11-02 | Stop reason: HOSPADM

## 2021-11-01 RX ADMIN — ATORVASTATIN CALCIUM 10 MG: 10 TABLET, FILM COATED ORAL at 20:52

## 2021-11-01 RX ADMIN — METRONIDAZOLE 500 MG: 500 INJECTION, SOLUTION INTRAVENOUS at 09:21

## 2021-11-01 RX ADMIN — AZATHIOPRINE 75 MG: 50 TABLET ORAL at 09:21

## 2021-11-01 RX ADMIN — RIFAXIMIN 550 MG: 550 TABLET ORAL at 20:53

## 2021-11-01 RX ADMIN — PANTOPRAZOLE SODIUM 40 MG: 40 TABLET, DELAYED RELEASE ORAL at 17:29

## 2021-11-01 RX ADMIN — Medication 5 MG: at 20:52

## 2021-11-01 RX ADMIN — SODIUM BICARBONATE 650 MG: 648 TABLET ORAL at 14:05

## 2021-11-01 RX ADMIN — SODIUM CHLORIDE, PRESERVATIVE FREE 10 ML: 5 INJECTION INTRAVENOUS at 09:23

## 2021-11-01 RX ADMIN — SODIUM CHLORIDE, PRESERVATIVE FREE 10 ML: 5 INJECTION INTRAVENOUS at 20:56

## 2021-11-01 RX ADMIN — IPRATROPIUM BROMIDE AND ALBUTEROL SULFATE 1 AMPULE: .5; 2.5 SOLUTION RESPIRATORY (INHALATION) at 18:15

## 2021-11-01 RX ADMIN — METRONIDAZOLE 500 MG: 500 INJECTION, SOLUTION INTRAVENOUS at 01:52

## 2021-11-01 RX ADMIN — LACTULOSE 30 G: 20 SOLUTION ORAL at 20:53

## 2021-11-01 RX ADMIN — SODIUM BICARBONATE 650 MG: 648 TABLET ORAL at 20:52

## 2021-11-01 RX ADMIN — RIFAXIMIN 550 MG: 550 TABLET ORAL at 09:20

## 2021-11-01 RX ADMIN — PANTOPRAZOLE SODIUM 40 MG: 40 TABLET, DELAYED RELEASE ORAL at 06:45

## 2021-11-01 RX ADMIN — LACTULOSE 30 G: 20 SOLUTION ORAL at 14:06

## 2021-11-01 RX ADMIN — LACTULOSE 20 G: 20 SOLUTION ORAL at 09:21

## 2021-11-01 RX ADMIN — WATER 1000 MG: 1 INJECTION INTRAMUSCULAR; INTRAVENOUS; SUBCUTANEOUS at 23:49

## 2021-11-01 RX ADMIN — BUDESONIDE 500 MCG: 0.5 INHALANT RESPIRATORY (INHALATION) at 18:15

## 2021-11-01 RX ADMIN — METRONIDAZOLE 500 MG: 500 INJECTION, SOLUTION INTRAVENOUS at 17:29

## 2021-11-01 RX ADMIN — IPRATROPIUM BROMIDE AND ALBUTEROL SULFATE 1 AMPULE: .5; 2.5 SOLUTION RESPIRATORY (INHALATION) at 14:01

## 2021-11-01 RX ADMIN — SODIUM BICARBONATE 650 MG: 648 TABLET ORAL at 09:20

## 2021-11-01 RX ADMIN — METOPROLOL SUCCINATE 100 MG: 100 TABLET, EXTENDED RELEASE ORAL at 09:21

## 2021-11-01 RX ADMIN — SODIUM CHLORIDE: 9 INJECTION, SOLUTION INTRAVENOUS at 23:49

## 2021-11-01 ASSESSMENT — PAIN SCALES - GENERAL
PAINLEVEL_OUTOF10: 0

## 2021-11-01 NOTE — PROGRESS NOTES
NEPHROLOGY Attending   Progress Note    Subjective:   Admit Date: 10/26/2021  PCP: Miguel Cushing, DO    Interval History:     11/1/21: Continues to require telesitter for intermittent periods of confusion - Per the patient's wife, he is more alert today - appetite is fair        Diet: ADULT DIET; Full Liquid    Data:   Scheduled Meds:   lactulose  30 g Oral TID    pantoprazole  40 mg Oral BID AC    [Held by provider] furosemide  20 mg IntraVENous Once    metoprolol succinate  100 mg Oral Daily    budesonide  500 mcg Nebulization BID    sodium bicarbonate  650 mg Oral TID    rifaximin  550 mg Oral BID    azaTHIOprine  75 mg Oral Daily    atorvastatin  10 mg Oral Nightly    sodium chloride flush  5-40 mL IntraVENous 2 times per day    insulin lispro  0-6 Units SubCUTAneous Q4H    cefTRIAXone (ROCEPHIN) IV  1,000 mg IntraVENous Q24H    metroNIDAZOLE  500 mg IntraVENous Q8H    melatonin  5 mg Oral Nightly    ipratropium-albuterol  1 ampule Inhalation Q4H While awake    sodium chloride flush  5-40 mL IntraVENous 2 times per day    influenza virus vaccine  0.5 mL IntraMUSCular Prior to discharge     Continuous Infusions:   sodium chloride      dextrose      sodium chloride       PRN Meds:ipratropium-albuterol, [Held by provider] traMADol, glucose, sodium chloride flush, sodium chloride, polyethylene glycol, prochlorperazine, glucose, dextrose, glucagon (rDNA), dextrose, sodium chloride flush, sodium chloride    Intake/Output Summary (Last 24 hours) at 11/1/2021 1202  Last data filed at 11/1/2021 9026  Gross per 24 hour   Intake 100 ml   Output --   Net 100 ml     CBC:   Recent Labs     10/30/21  0500 10/30/21  0500 10/30/21  1141 10/31/21  0444 11/01/21  0557   WBC 6.0  --   --  8.2 7.4   HGB 7.9*   < > 7.3* 7.9* 7.5*   *  --   --  141 133    < > = values in this interval not displayed.      BMP:    Recent Labs     10/30/21  0500 10/31/21  0444 11/01/21  0557    145 144   K 4.3 3.9 3.6 * 112* 111*   CO2 18* 24 22   BUN 14 11 10   CREATININE 1.1 1.3* 1.3*   GLUCOSE 132* 133* 119*     Hepatic:   Recent Labs     10/30/21  0500 10/31/21  0444 11/01/21  0557   AST 64* 49* 47*   ALT 26 22 20   BILITOT 0.5 0.6 0.5   ALKPHOS 101 105 92     Troponin: No results for input(s): TROPONINI in the last 72 hours. BNP: No results for input(s): BNP in the last 72 hours. Lipids: No results for input(s): CHOL, HDL in the last 72 hours. Invalid input(s): LDLCALCU  ABGs: No results found for: PHART, PO2ART, [id number]  INR:   Recent Labs     10/30/21  0500   INR 1.1     -----------------------------------------------------------------  RAD: CT ABDOMEN PELVIS W IV CONTRAST Additional Contrast? None    Result Date: 10/26/2021  EXAMINATION: CT OF THE ABDOMEN AND PELVIS WITH CONTRAST 10/26/2021 7:59 pm TECHNIQUE: CT of the abdomen and pelvis was performed with the administration of intravenous contrast. Multiplanar reformatted images are provided for review. Dose modulation, iterative reconstruction, and/or weight based adjustment of the mA/kV was utilized to reduce the radiation dose to as low as reasonably achievable. COMPARISON: None. HISTORY: ORDERING SYSTEM PROVIDED HISTORY: uppergi bleed, hematemesis TECHNOLOGIST PROVIDED HISTORY: Additional Contrast?->None Reason for exam:->uppergi bleed, hematemesis Decision Support Exception - unselect if not a suspected or confirmed emergency medical condition->Emergency Medical Condition (MA) FINDINGS: Lower Chest: Chronic interstitial changes in the lung bases with small calcified granuloma. No evidence of airspace consolidation or pleural effusions. Organs: Mildly lobulated liver with mild hypertrophy of the left lobe suggest cirrhosis. Perihepatic fluid. Splenic granuloma. There is a heterogeneous mass arising from the left adrenal gland with foci of macroscopic fat, small calcifications and large soft tissue component.  The presence of macroscopic fat is characteristic for myelolipoma. The mass measures approximately 4.7 cm in diameter. Given the size of the lesion surgical consultation is recommended. Consider biochemical lab evaluation for functional status and pheochromocytoma prior to resection. There are enlarged and tortuous portosystemic collaterals which may reflect portal hypertension. Prominent perigastric varices. Bilateral renal cysts, no obstructive uropathy. Partially contracted gallbladder with questionable mild wall thickening and pericholecystic stranding. Consider further evaluation with gallbladder ultrasound. GI/Bowel: No evidence of bowel obstruction or free intraperitoneal air. No colitis or diverticulitis. Multiple diverticula along the sigmoid colon. There is mild pericolonic stranding along the left pelvic sidewall with trace loculated fluid in the left side of the pelvis adjacent to the sigmoid colon. This may reflect sequela from acute diverticulitis. Clinical correlation recommended. Pelvis: Urinary bladder incompletely distended. Mild thickening of the urinary bladder wall and perivesical stranding which may reflect sequela from cystitis or chronic outlet obstruction. Peritoneum/Retroperitoneum: No abdominal aortic aneurysm or retroperitoneal adenopathy. Bones/Soft Tissues: No acute bony pathology. Mildly lobulated liver with mild hypertrophy of the left lobe which may represent cirrhosis. Small volume perihepatic fluid. 4.7 cm left adrenal mass with macroscopic fat characteristic for myelolipoma. Given the size of the lesion, surgical consultation is recommended. Consider biochemical lab evaluation for functional status and pheochromocytoma prior to resection. Enlarged and tortuous portosystemic collaterals and prominent Kassandra gastric varices likely reflecting portal hypertension. Partially contracted gallbladder with questionable mild wall thickening and pericholecystic stranding.   Consider further evaluation with gallbladder ultrasound. Sigmoid diverticulosis with mild pericolonic stranding along the left pelvic sidewall with trace loculated fluid in the left side of the pelvis which may reflect sequela from acute diverticulitis. No free intraperitoneal air. Clinical correlation recommended. Mild thickening of the urinary bladder wall and perivesical stranding which may reflect sequela from cystitis or chronic outlet obstruction. US GALLBLADDER RUQ    Result Date: 10/27/2021  EXAMINATION: RIGHT UPPER QUADRANT ULTRASOUND 10/27/2021 8:09 am COMPARISON: 12/22/2006 HISTORY: ORDERING SYSTEM PROVIDED HISTORY: abd pain TECHNOLOGIST PROVIDED HISTORY: Reason for exam:->abd pain What reading provider will be dictating this exam?->CRC FINDINGS: LIVER:  Liver demonstrates heterogeneous echotexture with a mildly nodular contour but no focal mass. Findings suggest cirrhosis. Portal vein appears to have a normal direction of flow and is patent. BILIARY SYSTEM:  Gallbladder appears normal in size with minimal dependent sludge. No tenderness. Gallbladder wall is 3 mm. No pericholecystic fluid. No shadowing calculi. Common bile duct is within normal limits measuring 2 mm. RIGHT KIDNEY: Right kidney demonstrates cortical thinning, normal renal cortical echogenicity. No hydronephrosis mass or calculus. Right kidney measures 11.1 x 5.8 x 6.3 cm PANCREAS:  Visualized portions of the pancreas are unremarkable. OTHER: No evidence of right upper quadrant ascites. No evidence of acute cholecystitis. Minimal gallbladder sludge. Liver has an abnormal echotexture suggesting cirrhosis. No ascites. XR CHEST PORTABLE    Result Date: 10/27/2021  EXAMINATION: ONE XRAY VIEW OF THE CHEST 10/27/2021 6:08 am COMPARISON: 10/26/2021 HISTORY: ORDERING SYSTEM PROVIDED HISTORY: hypoxia TECHNOLOGIST PROVIDED HISTORY: Reason for exam:->hypoxia FINDINGS: The lungs are without acute focal process. There is no effusion or pneumothorax.  The cardiomediastinal silhouette is without acute process. The osseous structures are without acute process. There are few scattered punctate benign less than 5 mm calcified granulomas within the right and left lungs. These findings are stable. No acute process. XR CHEST PORTABLE    Result Date: 10/26/2021  EXAMINATION: ONE XRAY VIEW OF THE CHEST 10/26/2021 8:40 pm COMPARISON: 01/17/2020 HISTORY: ORDERING SYSTEM PROVIDED HISTORY: upper gi bleed TECHNOLOGIST PROVIDED HISTORY: Reason for exam:->upper gi bleed FINDINGS: There is no cardiomegaly. There is no vascular congestion. There are multiple unchanged calcified granulomata bilaterally. Remaining lungs are clear. There is no pleural effusion or pneumothorax. No acute abnormality identified. EXAMINATION:   CTA OF THE CHEST 10/30/2021 3:19 am       TECHNIQUE:   CTA of the chest was performed after the administration of intravenous   contrast.  Multiplanar reformatted images are provided for review.  MIP   images are provided for review. Dose modulation, iterative reconstruction,   and/or weight based adjustment of the mA/kV was utilized to reduce the   radiation dose to as low as reasonably achievable.       COMPARISON:   None.       HISTORY:   ORDERING SYSTEM PROVIDED HISTORY: evalaute for PE   TECHNOLOGIST PROVIDED HISTORY:   Reason for exam:->evalaute for PE       FINDINGS:   Pulmonary Arteries: Pulmonary arteries are adequately opacified for   evaluation.  No evidence of intraluminal filling defect to suggest pulmonary   embolism.  Main pulmonary artery is normal in caliber.       Mediastinum: No evidence of mediastinal lymphadenopathy.  The heart and   pericardium demonstrate no acute abnormality.  There is no acute abnormality   of the thoracic aorta.       Lungs/pleura: Several scattered calcified granulomata are noted in the lungs.    Mild atelectasis at the left lower lung.  The lungs are without acute   process.  No focal consolidation or pulmonary edema.  No evidence of pleural   effusion or pneumothorax.       Upper Abdomen: There is a complex 4.4 cm left adrenal mass.  Recommend   further evaluation with MRI.  Multiple calcified granulomata are noted in the   spleen.  The remainder of the visualized portions of the upper abdomen is   within normal limits.       Soft Tissues/Bones: No acute bone or soft tissue abnormality.           Impression   No evidence of pulmonary embolism or acute pulmonary abnormality.       Complex 4.4 cm left adrenal mass. Recommend further evaluation with MRI. Multiple calcified granulomata are noted in the spleen.               Objective:   Vitals:   Vitals:    11/01/21 0800   BP: (!) 95/53   Pulse: 97   Resp: 18   Temp: 99.2 °F (37.3 °C)   SpO2: 92%     Patient Vitals for the past 24 hrs:   BP Temp Temp src Pulse Resp SpO2 Weight   11/01/21 0800 (!) 95/53 99.2 °F (37.3 °C) Oral 97 18 92 % --   11/01/21 0600 -- -- -- -- -- -- 199 lb 1.2 oz (90.3 kg)   10/31/21 2300 -- -- -- 90 -- -- --   10/31/21 2025 (!) 122/56 97.8 °F (36.6 °C) Oral 90 16 95 % --     General appearance: alert, appears stated age and cooperative  Skin: Skin color, texture, turgor normal. No rashes or lesions  HEENT: Head: Normocephalic, no lesions, without obvious abnormality. Neck: no adenopathy, no carotid bruit, no JVD, supple, symmetrical, trachea midline and thyroid not enlarged, symmetric, no tenderness/mass/nodules  Lungs: CTA  Heart: regular rate and rhythm, S1, S2 normal, no murmur, click, rub or gallop  Abdomen: soft, non-tender; bowel sounds normal; no masses,  no organomegaly  Extremities: extremities normal, atraumatic, no cyanosis or edema  Neurologic: Mental status: Alert, oriented, thought content appropriate      Assessment/Plan:   1.   Lactic acidosis-with a lactic acid of 11 mmol/L upon presentation due to the use of metformin in the setting of acute kidney injury - subsequently, lactic acid has improved and was 2.2 this AM and has risen to 2.6 then up to 5.3 and this AM back down to 1.8  PLAN:   1.  follow serial bicarbonate levels and lactic acid levles  2. Continue oral supplemental bicarbonate. 3.  Keep the patient off metformin. 2.  Hyperkalemia. Hyperkalemia is probably reflection of acute kidney  injury and the use of ACE inhibitor. Serum potassium was 6.9 mmol/L on presentation 10/26/21  K+ WNL  PLAN:  1. Monitor K+    3. Acute kidney injury. Acute kidney injury probably secondary to  volume depletion in the setting of nausea, vomiting and diarrhea and use  of ACE inhibitor. serum creatinine was 1.4 mg/dL upon presentation with a baseline of 0.9 mg/dL  UC NG  PLAN:  1. Continue to monitor    4. Hypertension with chronic kidney disease, stage G1 to G2. The  patient's hypertension  Is at goal <130/80  PLAN:  1. We will leave the patient off an ACE inhibitor. 5.  Underlying hepatic insufficiency possibly secondary to cirrhosis. GI workup in progress. PLAN:  1. Defer to Primary Service    6. Chronic kidney disease stage G2 with a baseline serum creatinine of 0.9 mg/dL. 7. L Adrenal mass-size of 4.4cm is concerning for increased risk of malignancy  PLAN:  1. Await biochemical workup    Electronically signed by MARY LOU Wasserman CNP     Patient seen and examined. Wife is present at the bedside. Chart reviewed. I had a face to face encounter with the patient. Agree with exam.    Agree with  formulation, assessment and plan as outlined above and directed by me. Addition and corrections were done as deemed appropriate. My exam and plan include:   Patient will need further evaluation of the left adrenal mass. Patient will need endocrinology evaluation post discharge. Continue current treatment.       Polly Hernandez MD  Nephrology        Electronically signed by Polly Hernandez MD on 11/1/2021 at 9:39 PM

## 2021-11-01 NOTE — PROGRESS NOTES
PROGRESS NOTE    By Daphney Murray D.O GI Fellow    [facility]  Dr. Sagar Li MD, Dr. Ector Oleary MD, Dr Violeta Fernandes, Dr. Daphne Pop MD, Dr. Jonel Perry, DO      Gary Dial Huzl  73 y.o.  male    [address] see and examined at bedside. Patient has no acute events overnight. Patient continue to have slight confusion and has asterixis. No hematemesis or abd pain.      OBJECTIVE    BP (!) 95/53   Pulse 97   Temp 99.2 °F (37.3 °C) (Oral)   Resp 18   Wt 199 lb 1.2 oz (90.3 kg)   SpO2 92%   BMI 29.40 kg/m²     Gen: NAD, AAO x 3  HEENT:PEERL, no icterus  Heart: RRR, no M/R/G  Lungs: CTAB  Abd.: soft, NT, ND, BS +, no G/R, no HSM  Extr.: no C/C/E, no bruising         Lab Results   Component Value Date    WBC 7.4 11/01/2021    WBC 8.2 10/31/2021    WBC 6.0 10/30/2021    HGB 7.5 11/01/2021    HGB 7.9 10/31/2021    HGB 7.3 10/30/2021    HCT 24.2 11/01/2021    .0 11/01/2021    RDW 20.4 11/01/2021     11/01/2021     10/31/2021     10/30/2021     Lab Results   Component Value Date     11/01/2021    K 3.6 11/01/2021    K 4.6 01/17/2020     11/01/2021    CO2 22 11/01/2021    BUN 10 11/01/2021    CREATININE 1.3 11/01/2021    CALCIUM 8.1 11/01/2021    PROT 6.2 11/01/2021    LABALBU 2.5 11/01/2021    LABALBU 4.3 07/31/2011    BILITOT 0.5 11/01/2021    BILITOT 0.6 10/31/2021    BILITOT 0.5 10/30/2021    ALKPHOS 92 11/01/2021    ALKPHOS 105 10/31/2021    ALKPHOS 101 10/30/2021    AST 47 11/01/2021    AST 49 10/31/2021    AST 64 10/30/2021    ALT 20 11/01/2021    ALT 22 10/31/2021    ALT 26 10/30/2021     Lab Results   Component Value Date    LIPASE 50 10/29/2021    LIPASE 86 10/26/2021     No results found for: AMYLASE      ASSESSMENT/PLAN:    1.  Acute blood loss anemia secondary to gastric variceal bleed - stable, no overt bleeding  -Patient is status post EGD with findings of large esophageal bleed as well as gastric fibrin clot.  Patient was also found to have portal hypertensive gastropathy. - D/C PPI infusion as well as octreotide drip. - Protonix 40 mg IV BID     -Type and cross, please keep 2 units hold at all times.  -Recommend holding Plavix  -Continue supportive care.    -Monitor H&H and transfuse per primary team.      2. Hepatic encephalopathy, Grade 2  -Patient was initiated on lactulose and rifaximin, but however, continue to be confused and has asterixis. We increase lactulose to 30g.    -Continue to monitor mental status and titrate lactulose to 2-3 bowel movement per day.     3. 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   -However, Patient also admitted to drinking about 2-4 shots of whiskey per day. Krystal Lynn has been drinking heavily for the past 3-4 years.  -Continue Azathioprine has mentioned above/  -Patient will most likely benefit from a TIPS evaluation, which can be done as an outpatient.        4. Adrenal mass  -Per general surgery and urology        Pt was discussed with attending    Darrell Aldridge DO  GI Fellow   11/1/2021  12:31 PM

## 2021-11-01 NOTE — PROGRESS NOTES
None  How much difficulty sitting down on / standing up from a chair with arms?: A Little  How much difficulty moving from lying on back to sitting on side of bed?: None  How much help from another person moving to and from a bed to a chair?: A Little  How much help from another person needed to walk in hospital room?: A Little  How much help from another person for climbing 3-5 steps with a railing?: A Little  AM-PAC Inpatient Mobility Raw Score : 20  AM-PAC Inpatient T-Scale Score : 47.67  Mobility Inpatient CMS 0-100% Score: 35.83  Mobility Inpatient CMS G-Code Modifier : [address] cleared patient for PT treatment. Patient's wife present during session   OBJECTIVE;   Initial Evaluation  Date: 10/31/2021 Treatment Date:    11/1/2021   Short Term/ Long Term   Goals   Was pt agreeable to Eval/treatment? Yes yes To be met in 3 days   Pain level   0/10    0/10    Bed Mobility    Rolling: Independent    Supine to sit: Independent    Sit to supine: Independent    Scooting: Independent    Rolling: Independent   Supine to sit:  Independent   Sit to supine: Independent   Scooting: Independent       Transfers Sit to stand: Supervision  from bed and chair Sit to stand: Supervision  cues for hand placement and safety     Sit to stand: Independent     Ambulation    3x75 feet using  wheeled walker with Minimal assist of 1   cues for walker approximation, safety and proper hand placement and 15 feet without device min a however unsteady 2x125 feet using  wheeled walker with Minimal assist of 1  assist with O2 tank cues for safety and pacing    100 feet using  least restrictive device versus no device with Independent    Stair negotiation: ascended and descended   Not assessed     3 steps with rail independent    ROM Within functional limits    Increase range of motion 10% of affected joints    Strength BUE:  4-/5  RLE:  4-/5  LLE:  4-/5  Increase strength in affected mm groups by 1/3 grade   Balance Sitting EOB:  good Dynamic Standing:  fair minus unsteady Sitting EOB: good   Dynamic Standing: fair + with wheeled walker   Sitting EOB:  good    Dynamic Standing: good       Patient is Alert & Oriented x person, place, time and situation and follows directions    Sensation:  Patient  reports numbness/tingling bilateral lower extremities     Edema:  yes bilateral lower extremities and bilateral upper extremities   Endurance: fair       Vitals: 4 liters nasal cannula   Blood Pressure at rest  Blood Pressure during session    Heart Rate at rest  Heart Rate during session    SPO2 at rest 95%  SPO2 during session 95-97%     Patient education  Patient educated on role of Physical Therapy, risks of immobility, safety and plan of care,  importance of mobility while in hospital , safety , stair training  and seated exercises     Patient response to education:   Pt verbalized understanding Pt demonstrated skill Pt requires further education in this area   Yes Partial Yes      Treatment:  Patient practiced and was instructed/facilitated in the following treatment: Patient transferred to edge of bed and  Sat edge of bed 3 minutes with Supervision  to increase dynamic sitting balance and activity tolerance. Patient stood to wheeled walker to amb in hallway, seated rest needed, performed stair training and amb back to room. Patient sat edge of bed and performed seated exercises. Patient transferred back to supine position in bed. Therapeutic Exercises:  ankle pumps, long arc quad and seated marching x10-15 reps       At end of session, patient in bed with spouse present call light and phone within reach,  all lines and tubes intact, nursing notified. Patient would benefit from continued skilled Physical Therapy to improve functional independence and quality of life.          Patient's/ family goals   home    Time in  132  Time out  157    Total Treatment Time  25 minutes    CPT codes:  Therapeutic activities (95272)   15 minutes  1 unit(s)  Gait Training (38090) 10 minutes 1 unit(s)    Veena Shell, [facility]  #[id number]

## 2021-11-01 NOTE — PROGRESS NOTES
Date:2021  Patient Name: Lili Porter  MRN: [id number]  : 1950  ROOM #: [id number]    Occupational Therapy order received, chart reviewed and evaluation attempted this date. Patient is unavailable for OT evaluation due to off floor in PM. Will re-attempt OT evaluation at a later time. Thank you.      Stacy Gonzales OTR/L #[id number]

## 2021-11-01 NOTE — PLAN OF CARE
Problem: Falls - Risk of:  Goal: Will remain free from falls  Description: Will remain free from falls  Outcome: Met This Shift     Problem: Falls - Risk of:  Goal: Absence of physical injury  Description: Absence of physical injury  Outcome: Met This Shift     Problem: Bowel/Gastric:  Goal: Occurrences of nausea will decrease  Description: Occurrences of nausea will decrease  Outcome: Met This Shift     Problem:  Bowel/Gastric:  Goal: Ability to achieve a regular elimination pattern will improve  Description: Ability to achieve a regular elimination pattern will improve  Outcome: Met This Shift     Problem: Pain:  Goal: Pain level will decrease  Description: Pain level will decrease  Outcome: Met This Shift     Problem: Pain:  Goal: Control of acute pain  Description: Control of acute pain  Outcome: Met This Shift

## 2021-11-01 NOTE — PROGRESS NOTES
Department of Internal Medicine  pn    PCP: Arya Albert DO  Admitting Physician: Dr. James Soria  Consultants: Dr. Janine Bernal and Dr. Michael Whitney  Date of Service: 10/26/2021    CHIEF COMPLAINT:  Hematemesis     HISTORY OF PRESENT ILLNESS:    Patient is a 29-year-old male who presented to the ED due to bout of hematemesis. Patient also complains of heartburn. Patient does admit to melena/blood in stools. Patient had upper endoscopy and colonoscopy earlier this year as well as capsule endoscopy. Patient states currently heartburn is resolved. He does admit to suprapubic abdominal discomfort. He denies any fever or chills. He does admit to shortness of breath with exertion. He denies any chest pain. 10/27/2021  Patient seen and examined on telemetry floor. Patient's wife is at the bedside and case discussed. Patient still having problems with hematemesis and hematochezia. Patient denies chest pain has some vague abdominal discomfort. Patient has occasional mild nausea. He denies any unusual shortness of breath. Patient does complain of his Teran catheter. Patient set up for EGD today. 10/28/2021  Patient seen examined on telemetry floor. Patient is alert oriented person place but patient is still has somewhat of a flat affect. Case discussed with patient's wife at the bedside. BUN/creatinine 34/1.1 with lactic acid ranging 3.1-5.0. Blood sugars ranging 139-188. Serum ammonia is normal at 33. Hemoglobin 8.5 temperature 98.8 with heart rate 70 and blood pressure 131/63. O2 sat 95% on 3 L nasal cannula. Urine output seems to be adequate. EGD yesterday showed large esophageal varices with gastric varices with clot. 10/29/2021  Patient seen examined on telemetry floor. Patient has been tachycardic apparently over the last 12 hours or so. Reviewing the chart the patient's Toprol-XL was held on admission. Patient's wife at the bedside and case discussed.   Patient sitting up in the bed and currently denies any pain. He also denies any unusual shortness of breath. BUN/creatinine 21/1.1 with serum sodium 147. Blood sugars ranging 161-217. Hemoglobin is down to 7.7 today and was high as 8.5 late yesterday morning. WBC and platelet count are stable. Temperature is 98.4 with heart rate 120 with blood pressure 141/56. O2 sat 91% on room air at rest.  Give 5 mg IV push of metoprolol and resume Toprol-XL. 10/30/2021  Patient seen examined on telemetry floor. Patient apparently is more short of breath earlier a.m. and CTA lungs were ordered and was no evidence of PE and no focal consolidation or pulmonary edema noted. There was a complex 4.4 cm left adrenal mass noted. Patient states he is feeling better currently and denies any problem with any chest pain. Patient's wife is at the bedside and case discussed. BUN/creatinine 14/1.1 with blood sugars ranging 132-295. Hemoglobin ranges 7.3-7.9. Temperature 99.2 with patient's heart rate 124 still blood pressure 123/53 with O2 sat 94% on 3 L nasal cannula. Patient did have a history of smoking about 30 years and smoked 1-2 packs a day. 10/31/2021  Patient seen examined on telemetry floor. Patient's wife is at the bedside. Patient complaining of left arm pain with increase edema noted most likely from IV infiltration with area of ecchymosis. Patient denies any problem with any chest pain. Patient is requesting to go home. BUN/creatinine 11/1.3. Blood sugars are ranging 139-295. Lactic acid down to 1.7 this morning but was 5.3 late yesterday afternoon. Hemoglobin 7.9 with hemoglobin 8.2. Temperature 98.1 with heart rate 98 blood pressure 112/57. O2 sat 93% on 4 L nasal cannula. 11/1/2021  Patient seen examined on telemetry floor. Case discussed with patient's wife at the bedside. Patient seems to be doing little bit better again today. BUN/creatinine was 10/1.3 with blood sugars ranging 117-162.   Liver enzymes essentially normal.  WBC 7.4 with hemoglobin 7.5. Temperature ranges 97.8-99.3 with heart rate 97 blood pressure ranges 95//56. O2 sat currently 92% on 4 L nasal cannula. We will consult / for home O2. PAST MEDICAL Hx:  Past Medical History:   Diagnosis Date    Diabetes mellitus ([facility] [address]     Hepatitis C antibody test positive     Hyperlipidemia     Hypertension     MI, old        PAST SURGICAL Hx:   Past Surgical History:   Procedure Laterality Date    COLONOSCOPY  2020    CORONARY ANGIOPLASTY WITH STENT PLACEMENT      UPPER GASTROINTESTINAL ENDOSCOPY N/A 1/19/2020    EGD BIOPSY performed by Stephanie Martinez DO at [facility] N/A 10/27/2021    EGD ESOPHAGOGASTRODUODENOSCOPY performed by Zach Banerjee MD at [address] [place] [address] Hx:  History reviewed. No pertinent family history. HOME MEDICATIONS:  Prior to Admission medications    Medication Sig Start Date End Date Taking?  Authorizing Provider   cyanocobalamin ([employer] VITAMIN B12) 1000 MCG tablet Take 1 tablet by mouth daily 9/17/21  Yes Senthil Rees MD   pyridoxine (RA VITAMIN B-6) 50 MG tablet Take 1 tablet by mouth daily 9/17/21  Yes Senthil Rees MD   folic acid (FOLVITE) 1 MG tablet Take 1 tablet by mouth daily 9/17/21  Yes Senthil Rees MD   ferrous sulfate (IRON 325) 325 (65 Fe) MG tablet Take 1 tablet by mouth 2 times daily 9/17/21  Yes Senthil Rees MD   aspirin 81 MG chewable tablet Take 81 mg by mouth daily   Yes Historical Provider, MD   glipiZIDE (GLUCOTROL) 10 MG tablet Take 10 mg by mouth daily    Yes Historical Provider, MD   amLODIPine (NORVASC) 10 MG tablet Take 10 mg by mouth daily   Yes Historical Provider, MD   enalapril (VASOTEC) 5 MG tablet Take 5 mg by mouth daily   Yes Historical Provider, MD   metoprolol succinate (TOPROL XL) 100 MG extended release tablet Take 100 mg by mouth daily   Yes Historical Provider, MD   melatonin 5 MG TABS tablet Take 5 mg by mouth nightly   Yes Historical Provider, MD   Multiple Vitamin (MULTI-VITAMIN DAILY PO) Take 1 tablet by mouth daily    Yes Historical Provider, MD   Coenzyme Q10 (COQ-10) 100 MG CAPS Take 100 mg by mouth nightly    Yes Historical Provider, MD   metformin (GLUCOPHAGE) 500 MG tablet Take 1,000 mg by mouth 2 times daily (with meals)    Yes Historical Provider, MD   omeprazole (PRILOSEC) 20 MG capsule Take 20 mg by mouth daily. Yes Historical Provider, MD   azaTHIOprine (IMURAN) 50 MG tablet Take 50 mg by mouth daily. Yes Historical Provider, MD   clopidogrel (PLAVIX) 75 MG tablet Take 75 mg by mouth daily. Yes Historical Provider, MD   atorvastatin (LIPITOR) 10 MG tablet Take 10 mg by mouth nightly    Yes Historical Provider, MD       ALLERGIES:  Codeine    SOCIAL Hx:  Social History     Socioeconomic History    Marital status:      Spouse name: Not on file    Number of children: Not on file    Years of education: Not on file    Highest education level: Not on file   Occupational History    Not on file   Tobacco Use    Smoking status: Former Smoker     Packs/day: 1.00     Quit date: 2006     Years since quitting: 15.8    Smokeless tobacco: Never Used   Substance and Sexual Activity    Alcohol use: Yes     Comment: occasional    Drug use: Not Currently     Types: Marijuana Camila Yosef)    Sexual activity: Yes     Partners: Female   Other Topics Concern    Not on file   Social History Narrative    Not on file     Social Determinants of Health     Financial Resource Strain:     Difficulty of Paying Living Expenses:    Food Insecurity:     Worried About Running Out of Food in the Last Year:     [address] in the Last Year:    Transportation Needs:     Lack of Transportation (Medical):      Lack of Transportation (Non-Medical):    Physical Activity:     Days of Exercise per Week:     Minutes of Exercise per Session:    Stress:     Feeling of Stress :    Social Connections:     Frequency of lymphadenopathy    LUNGS:    Symmetric. Coarse decreased breath sounds to auscultation bilaterally    CARDIOVASCULAR:    Normal apical impulse, regular rate and rhythm, normal S1 and S2, no S3 or S4, and no murmur noted    ABDOMEN:     soft, mildly distended, +tender    MUSCULOSKELETAL:    There is no redness, warmth, or swelling of the joints. NEUROLOGIC:    Awake, alert, oriented to name, place and time. SKIN:    No bruising or bleeding. No redness, warmth, or swelling    EXTREMITIES:    Peripheral pulses present.  + Right arm edema and trace lower leg edema,no cyanosis    LINES/CATHETERS     LABORATORY DATA:  CBC with Differential:    Lab Results   Component Value Date    WBC 7.4 11/01/2021    RBC 2.22 11/01/2021    HGB 7.5 11/01/2021    HCT 24.2 11/01/2021     11/01/2021    .0 11/01/2021    MCH 33.8 11/01/2021    MCHC 31.0 11/01/2021    RDW 20.4 11/01/2021    NRBC 2.7 10/31/2021    SEGSPCT 69 08/25/2013    BANDSPCT 2 03/24/2016    METASPCT 0.9 09/16/2021    LYMPHOPCT 8.8 11/01/2021    MONOPCT 10.5 11/01/2021    BASOPCT 0.8 11/01/2021    MONOSABS 0.81 11/01/2021    LYMPHSABS 0.67 11/01/2021    EOSABS 0.19 11/01/2021    BASOSABS 0.00 11/01/2021     CMP:    Lab Results   Component Value Date     11/01/2021    K 3.6 11/01/2021    K 4.6 01/17/2020     11/01/2021    CO2 22 11/01/2021    BUN 10 11/01/2021    CREATININE 1.3 11/01/2021    GFRAA >60 11/01/2021    LABGLOM 55 11/01/2021    GLUCOSE 119 11/01/2021    GLUCOSE 136 07/31/2011    PROT 6.2 11/01/2021    LABALBU 2.5 11/01/2021    LABALBU 4.3 07/31/2011    CALCIUM 8.1 11/01/2021    BILITOT 0.5 11/01/2021    ALKPHOS 92 11/01/2021    AST 47 11/01/2021    ALT 20 11/01/2021       ASSESSMENT/PLAN:  1. Acute on chronic anemia secondary to GI bleed from gastric varices  2. Acute hypoxic respiratory failure  3. Large esophageal varices without bleeding  4. Hyperkalemia-resolved  5. Persistent lactic acidosis  6.  CT findings suggestive of hepatic cirrhosis  7. 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 Sigmoid diverticulosis with mild pericolonic stranding with trace loculated fluid in left pelvis  9. History of autoimmune hepatitis  10. Coronary artery disease with stents in place  11. Hypertension  12. Hyperlipidemia  13. Non-insulin-dependent diabetes mellitus-hemoglobin A1c 4.8  14. History of prior tobacco abuse-about 50-pack-year history and has acute exacerbation of underlying COPD    Patient presented with abdominal pain and hematemesis. Patient found to have a drop in H&H. Patient started on Protonix drip. Patient placed on Protonix twice daily. Patient patient started on Rocephin and Flagyl in the setting of CT findings. General surgery consulted. GI consulted. Patient will be transfused 1 unit PRBC. On initial examination patient was not hypoxic. However he developed hypoxia. Home medications reviewed  Case discussed with general surgery and GI. Glucoscans 4 times daily with sliding scale insulin  EGD 10/28-large esophageal varices, gastric varices with fibrin clot, portal hypertensive gastropathy    GI is now contemplating transfer to [place] outpatient. Discharge home when okay with GI and nephrology    Consult  for home O2    CMP, CBC lactic acid in a.m.     Edwin Camacho DO  11:52 AM  11/1/2021

## 2021-11-02 VITALS
DIASTOLIC BLOOD PRESSURE: 60 MMHG | HEART RATE: 106 BPM | SYSTOLIC BLOOD PRESSURE: 120 MMHG | TEMPERATURE: 99 F | HEIGHT: 69 IN | BODY MASS INDEX: 29.49 KG/M2 | OXYGEN SATURATION: 99 % | RESPIRATION RATE: 20 BRPM | WEIGHT: 199.08 LBS

## 2021-11-02 LAB
5-NUCLEOTIDASE: 33 U/L (ref 0–15)
ALBUMIN SERPL-MCNC: 2.7 G/DL (ref 3.5–5.2)
ALP BLD-CCNC: 89 U/L (ref 40–129)
ALT SERPL-CCNC: 18 U/L (ref 0–40)
ANION GAP SERPL CALCULATED.3IONS-SCNC: 8 MMOL/L (ref 7–16)
ANISOCYTOSIS: ABNORMAL
AST SERPL-CCNC: 51 U/L (ref 0–39)
BASOPHILS ABSOLUTE: 0 E9/L (ref 0–0.2)
BASOPHILS RELATIVE PERCENT: 0.8 % (ref 0–2)
BILIRUB SERPL-MCNC: 0.5 MG/DL (ref 0–1.2)
BUN BLDV-MCNC: 10 MG/DL (ref 6–23)
BURR CELLS: ABNORMAL
CALCIUM SERPL-MCNC: 7.9 MG/DL (ref 8.6–10.2)
CHLORIDE BLD-SCNC: 109 MMOL/L (ref 98–107)
CO2: 24 MMOL/L (ref 22–29)
CREAT SERPL-MCNC: 1.4 MG/DL (ref 0.7–1.2)
EOSINOPHILS ABSOLUTE: 0.12 E9/L (ref 0.05–0.5)
EOSINOPHILS RELATIVE PERCENT: 1.8 % (ref 0–6)
GFR AFRICAN AMERICAN: >60
GFR NON-AFRICAN AMERICAN: 50 ML/MIN/1.73
GLUCOSE BLD-MCNC: 90 MG/DL (ref 74–99)
HCT VFR BLD CALC: 23 % (ref 37–54)
HEMOGLOBIN: 7.4 G/DL (ref 12.5–16.5)
LACTIC ACID: 3 MMOL/L (ref 0.5–2.2)
LACTIC ACID: 3.4 MMOL/L (ref 0.5–2.2)
LYMPHOCYTES ABSOLUTE: 0.92 E9/L (ref 1.5–4)
LYMPHOCYTES RELATIVE PERCENT: 14.2 % (ref 20–42)
MCH RBC QN AUTO: 35.1 PG (ref 26–35)
MCHC RBC AUTO-ENTMCNC: 32.2 % (ref 32–34.5)
MCV RBC AUTO: 109 FL (ref 80–99.9)
METER GLUCOSE: 102 MG/DL (ref 74–99)
METER GLUCOSE: 105 MG/DL (ref 74–99)
METER GLUCOSE: 122 MG/DL (ref 74–99)
METER GLUCOSE: 157 MG/DL (ref 74–99)
MONOCYTES ABSOLUTE: 0.92 E9/L (ref 0.1–0.95)
MONOCYTES RELATIVE PERCENT: 14.2 % (ref 2–12)
NEUTROPHILS ABSOLUTE: 4.62 E9/L (ref 1.8–7.3)
NEUTROPHILS RELATIVE PERCENT: 69.9 % (ref 43–80)
OVALOCYTES: ABNORMAL
PDW BLD-RTO: 20.6 FL (ref 11.5–15)
PLATELET # BLD: 118 E9/L (ref 130–450)
PMV BLD AUTO: 11.7 FL (ref 7–12)
POIKILOCYTES: ABNORMAL
POTASSIUM SERPL-SCNC: 3.1 MMOL/L (ref 3.5–5)
RBC # BLD: 2.11 E12/L (ref 3.8–5.8)
SODIUM BLD-SCNC: 141 MMOL/L (ref 132–146)
TOTAL PROTEIN: 5.9 G/DL (ref 6.4–8.3)
VACUOLATED NEUTROPHILS: ABNORMAL
WBC # BLD: 6.6 E9/L (ref 4.5–11.5)

## 2021-11-02 PROCEDURE — 82657 ENZYME CELL ACTIVITY: CPT

## 2021-11-02 PROCEDURE — 6370000000 HC RX 637 (ALT 250 FOR IP): Performed by: INTERNAL MEDICINE

## 2021-11-02 PROCEDURE — 82962 GLUCOSE BLOOD TEST: CPT

## 2021-11-02 PROCEDURE — 82784 ASSAY IGA/IGD/IGG/IGM EACH: CPT

## 2021-11-02 PROCEDURE — 2700000000 HC OXYGEN THERAPY PER DAY

## 2021-11-02 PROCEDURE — 6360000002 HC RX W HCPCS: Performed by: INTERNAL MEDICINE

## 2021-11-02 PROCEDURE — 83605 ASSAY OF LACTIC ACID: CPT

## 2021-11-02 PROCEDURE — G0008 ADMIN INFLUENZA VIRUS VAC: HCPCS | Performed by: INTERNAL MEDICINE

## 2021-11-02 PROCEDURE — 36415 COLL VENOUS BLD VENIPUNCTURE: CPT

## 2021-11-02 PROCEDURE — 90694 VACC AIIV4 NO PRSRV 0.5ML IM: CPT | Performed by: INTERNAL MEDICINE

## 2021-11-02 PROCEDURE — 80053 COMPREHEN METABOLIC PANEL: CPT

## 2021-11-02 PROCEDURE — 2500000003 HC RX 250 WO HCPCS: Performed by: INTERNAL MEDICINE

## 2021-11-02 PROCEDURE — 94640 AIRWAY INHALATION TREATMENT: CPT

## 2021-11-02 PROCEDURE — 85025 COMPLETE CBC W/AUTO DIFF WBC: CPT

## 2021-11-02 RX ORDER — PREDNISONE 20 MG/1
20 TABLET ORAL DAILY
Status: DISCONTINUED | OUTPATIENT
Start: 2021-11-02 | End: 2021-11-02 | Stop reason: HOSPADM

## 2021-11-02 RX ORDER — IPRATROPIUM BROMIDE AND ALBUTEROL SULFATE 2.5; .5 MG/3ML; MG/3ML
3 SOLUTION RESPIRATORY (INHALATION)
Qty: 360 ML | Refills: 3 | Status: SHIPPED | OUTPATIENT
Start: 2021-11-02

## 2021-11-02 RX ORDER — AZATHIOPRINE 50 MG/1
75 TABLET ORAL DAILY
Qty: 30 TABLET | Refills: 3 | COMMUNITY
Start: 2021-11-12

## 2021-11-02 RX ORDER — SODIUM BICARBONATE 650 MG/1
650 TABLET ORAL 2 TIMES DAILY
Qty: 90 TABLET | Refills: 3 | Status: SHIPPED | OUTPATIENT
Start: 2021-11-02

## 2021-11-02 RX ORDER — GLIPIZIDE 10 MG/1
5 TABLET ORAL DAILY
Qty: 60 TABLET | Refills: 3 | COMMUNITY
Start: 2021-11-02

## 2021-11-02 RX ORDER — POTASSIUM CHLORIDE 1.5 G/1.77G
20 POWDER, FOR SOLUTION ORAL ONCE
Status: DISCONTINUED | OUTPATIENT
Start: 2021-11-02 | End: 2021-11-02 | Stop reason: CLARIF

## 2021-11-02 RX ORDER — LACTULOSE 10 G/15ML
30 SOLUTION ORAL 2 TIMES DAILY
Qty: 946 ML | Refills: 5 | Status: SHIPPED | OUTPATIENT
Start: 2021-11-02 | End: 2021-11-02

## 2021-11-02 RX ORDER — LACTULOSE 10 G/15ML
20 SOLUTION ORAL 2 TIMES DAILY
Qty: 946 ML | Refills: 5 | Status: SHIPPED | OUTPATIENT
Start: 2021-11-02

## 2021-11-02 RX ORDER — CLOPIDOGREL BISULFATE 75 MG/1
75 TABLET ORAL DAILY
Qty: 30 TABLET | Refills: 3 | Status: SHIPPED | OUTPATIENT
Start: 2021-11-12

## 2021-11-02 RX ORDER — POTASSIUM CHLORIDE 1.5 G/1.77G
40 POWDER, FOR SOLUTION ORAL ONCE
Status: DISCONTINUED | OUTPATIENT
Start: 2021-11-02 | End: 2021-11-02 | Stop reason: CLARIF

## 2021-11-02 RX ORDER — PREDNISONE 20 MG/1
20 TABLET ORAL DAILY
Qty: 10 TABLET | Refills: 0 | Status: SHIPPED | OUTPATIENT
Start: 2021-11-02 | End: 2021-11-12

## 2021-11-02 RX ADMIN — IPRATROPIUM BROMIDE AND ALBUTEROL SULFATE 1 AMPULE: .5; 2.5 SOLUTION RESPIRATORY (INHALATION) at 06:20

## 2021-11-02 RX ADMIN — RIFAXIMIN 550 MG: 550 TABLET ORAL at 09:21

## 2021-11-02 RX ADMIN — METRONIDAZOLE 500 MG: 500 INJECTION, SOLUTION INTRAVENOUS at 01:48

## 2021-11-02 RX ADMIN — BUDESONIDE 500 MCG: 0.5 INHALANT RESPIRATORY (INHALATION) at 06:20

## 2021-11-02 RX ADMIN — POTASSIUM BICARBONATE 40 MEQ: 782 TABLET, EFFERVESCENT ORAL at 09:21

## 2021-11-02 RX ADMIN — PREDNISONE 20 MG: 20 TABLET ORAL at 12:59

## 2021-11-02 RX ADMIN — IPRATROPIUM BROMIDE AND ALBUTEROL SULFATE 1 AMPULE: .5; 2.5 SOLUTION RESPIRATORY (INHALATION) at 09:58

## 2021-11-02 RX ADMIN — SODIUM BICARBONATE 650 MG: 648 TABLET ORAL at 14:28

## 2021-11-02 RX ADMIN — POTASSIUM BICARBONATE 20 MEQ: 782 TABLET, EFFERVESCENT ORAL at 12:59

## 2021-11-02 RX ADMIN — SODIUM BICARBONATE 650 MG: 648 TABLET ORAL at 09:21

## 2021-11-02 RX ADMIN — METOPROLOL SUCCINATE 100 MG: 100 TABLET, EXTENDED RELEASE ORAL at 09:21

## 2021-11-02 RX ADMIN — INFLUENZA VACCINE, ADJUVANTED 0.5 ML: 15; 15; 15; 15 INJECTION, SUSPENSION INTRAMUSCULAR at 13:32

## 2021-11-02 RX ADMIN — PANTOPRAZOLE SODIUM 40 MG: 40 TABLET, DELAYED RELEASE ORAL at 05:56

## 2021-11-02 ASSESSMENT — PAIN SCALES - GENERAL: PAINLEVEL_OUTOF10: 0

## 2021-11-02 NOTE — PROGRESS NOTES
Nephrology Note  Sal Cortes MD          Patient seen and examined. No family member is present at bedside. Chart reviewed. Wife is present at the bedside. Patient is anxious to be discharged as he has a appointment with gastroenterology at [facility] on November 4, 2021. Patient is feeling better. Denies shortness of breath. Appetite good. No nausea or dysguesia. Awake and alert . In no acute distress. Vital SignsBP 120/60   Pulse 106   Temp 99 °F (37.2 °C) (Oral)   Resp 20   Ht 5' 9\" (1.753 m)   Wt 199 lb 1.2 oz (90.3 kg)   SpO2 92%   BMI 29.40 kg/m²   24HR INTAKE/OUTPUT:    Intake/Output Summary (Last 24 hours) at 11/2/2021 1436  Last data filed at 11/2/2021 1315  Gross per 24 hour   Intake 340 ml   Output --   Net 340 ml         Physical Exam    Neck: No JVD  Lungs: Breath sounds decreased at the bases. No rales or ronchi. Heart: Regular rate and rhythm. No S3 gallop. No  murmrur. Abdomen: Soft non distended, non tender and normal bowel sounds. Extremeties: No edema.       ROS:  RESPIRATORY:  negative  CARDIOVASCULAR:  negative  GASTROINTESTINAL:  negative  GENITOURINARY:  negative    Current Facility-Administered Medications   Medication Dose Route Frequency Provider Last Rate Last Admin    potassium bicarb-citric acid (EFFER-K) effervescent tablet 20 mEq  20 mEq Oral Once Leena Vargas MD        predniSONE (DELTASONE) tablet 20 mg  20 mg Oral Daily Frankie Vasquez, DO        lactulose (CHRONULAC) 10 GM/15ML solution 30 g  30 g Oral TID Frankie Vasquez, DO   30 g at 11/01/21 2053    pantoprazole (PROTONIX) tablet 40 mg  40 mg Oral BID AC Mitchael Dey, DO   40 mg at 11/02/21 0556    [Held by provider] furosemide (LASIX) injection 20 mg  20 mg IntraVENous Once Ples Salvage, DO        metoprolol succinate (TOPROL XL) extended release tablet 100 mg  100 mg Oral Daily Donzetta Kaylade, DO   100 mg at 11/02/21 0921    budesonide (PULMICORT) nebulizer suspension 500 mcg  500 mcg Nebulization BID Luly Stager, DO   500 mcg at 11/02/21 2125    ipratropium-albuterol (DUONEB) nebulizer solution 1 ampule  1 ampule Inhalation Q4H PRN Luly Stager, DO        sodium bicarbonate tablet 650 mg  650 mg Oral TID Prosper Beverly Rodrigez MD   650 mg at 11/02/21 0921    rifaximin (XIFAXAN) tablet 550 mg  550 mg Oral BID Alisha Harrison, DO   550 mg at 11/02/21 9674    [Held by provider] azaTHIOprine (IMURAN) tablet 75 mg  75 mg Oral Daily Luly Stager, DO   75 mg at 11/01/21 8508    [Held by provider] traMADol (ULTRAM) tablet 25 mg  25 mg Oral Q6H PRN Luly Stager, DO   25 mg at 10/28/21 2035    atorvastatin (LIPITOR) tablet 10 mg  10 mg Oral Nightly Nellie Crooked, DO   10 mg at 11/01/21 2052    glucose (GLUTOSE) 40 % oral gel 15 g  15 g Oral PRN Nellie Crooked, DO        sodium chloride flush 0.9 % injection 5-40 mL  5-40 mL IntraVENous 2 times per day Nellie Crooked, DO   10 mL at 11/01/21 2056    sodium chloride flush 0.9 % injection 5-40 mL  5-40 mL IntraVENous PRN Nellie Crooked, DO        0.9 % sodium chloride infusion  25 mL IntraVENous PRN Nellie Crooked, DO        polyethylene glycol (GLYCOLAX) packet 17 g  17 g Oral Daily PRN Nellie Crooked, DO        insulin lispro (HUMALOG) injection vial 0-6 Units  0-6 Units SubCUTAneous Q4H Nellie Crooked, DO   1 Units at 10/31/21 2307    prochlorperazine (COMPAZINE) injection 10 mg  10 mg IntraVENous Q6H PRN Nellie Crooked, DO   10 mg at 10/27/21 0156    cefTRIAXone (ROCEPHIN) 1,000 mg in sterile water 10 mL IV syringe  1,000 mg IntraVENous Q24H Nellie Crooked, DO   1,000 mg at 11/01/21 2349    metronidazole (FLAGYL) 500 mg in NaCl 100 mL IVPB premix  500 mg IntraVENous Q8H Nellie Crooked, DO   Stopped at 11/02/21 0339    glucose (GLUTOSE) 40 % oral gel 15 g  15 g Oral PRN Nellie Crooked, DO        dextrose 50 % IV solution  12.5 g IntraVENous PRN Nellie Crooked, DO        glucagon (rDNA) injection 1 mg  1 mg IntraMUSCular PRN Maryjean More, DO        dextrose 5 % solution  100 mL/hr IntraVENous PRN Maryjean More, DO        melatonin disintegrating tablet 5 mg  5 mg Oral Nightly Marylauren Justin, DO   5 mg at 11/01/21 2052    ipratropium-albuterol (DUONEB) nebulizer solution 1 ampule  1 ampule Inhalation Q4H While awake Dean Justin, DO   1 ampule at 11/02/21 0958    sodium chloride flush 0.9 % injection 5-40 mL  5-40 mL IntraVENous 2 times per day Thadeus Dapash, DO   10 mL at 11/01/21 2056    sodium chloride flush 0.9 % injection 5-40 mL  5-40 mL IntraVENous PRN Thadeus Dapash, DO        0.9 % sodium chloride infusion  25 mL IntraVENous PRN Thadeus Dapash, DO        influenza A&B vaccine (FLUAD QUADRIVALENT) injection 0.5 mL  0.5 mL IntraMUSCular Prior to discharge Adam David, DO           US DUP ABD PEL RETRO SCROT LIMITED   Final Result   Hepatopetal portal flow with patent portal vein. CTA CHEST W CONTRAST   Final Result   No evidence of pulmonary embolism or acute pulmonary abnormality. Complex 4.4 cm left adrenal mass. Recommend further evaluation with MRI. Multiple calcified granulomata are noted in the spleen. CT HEAD WO CONTRAST   Final Result   No acute intracranial abnormality. XR CHEST PORTABLE   Final Result   No acute process. XR ABDOMEN (KUB) (SINGLE AP VIEW)   Final Result   Colonic fecal retention. US GALLBLADDER RUQ   Final Result   No evidence of acute cholecystitis. Minimal gallbladder sludge. Liver has   an abnormal echotexture suggesting cirrhosis. No ascites. XR CHEST PORTABLE   Final Result   No acute process. CT ABDOMEN PELVIS W IV CONTRAST Additional Contrast? None   Final Result   Mildly lobulated liver with mild hypertrophy of the left lobe which may   represent cirrhosis. Small volume perihepatic fluid. 4.7 cm left adrenal mass with macroscopic fat characteristic for myelolipoma.    Given the size of the lesion, surgical consultation is recommended. Consider   biochemical lab evaluation for functional status and pheochromocytoma prior   to resection. Enlarged and tortuous portosystemic collaterals and prominent Kassandra gastric   varices likely reflecting portal hypertension. Partially contracted gallbladder with questionable mild wall thickening and   pericholecystic stranding. Consider further evaluation with gallbladder   ultrasound. Sigmoid diverticulosis with mild pericolonic stranding along the left pelvic   sidewall with trace loculated fluid in the left side of the pelvis which may   reflect sequela from acute diverticulitis. No free intraperitoneal air. Clinical correlation recommended. Mild thickening of the urinary bladder wall and perivesical stranding which   may reflect sequela from cystitis or chronic outlet obstruction. XR CHEST PORTABLE   Final Result   No acute abnormality identified. Labs:    CBC:   Recent Labs     10/31/21  0444 11/01/21  0557 11/02/21  0549   WBC 8.2 7.4 6.6   HGB 7.9* 7.5* 7.4*    133 118*        Lab Results   Component Value Date    IRON 118 10/28/2021    TIBC 238 (L) 10/28/2021    FERRITIN 52 10/28/2021       Lab Results   Component Value Date    CALCIUM 7.9 (L) 11/02/2021    CALCIUM 8.1 (L) 11/01/2021    CALCIUM 8.0 (L) 10/31/2021    PHOS 3.0 10/30/2021    PHOS 2.5 10/27/2021    PHOS 3.0 01/18/2020    MG 1.7 10/31/2021    MG 1.8 10/30/2021    MG 1.5 (L) 10/28/2021       BMP:   Recent Labs     10/31/21  0444 11/01/21  0557 11/02/21  0549    144 141   K 3.9 3.6 3.1*   * 111* 109*   CO2 24 22 24   BUN 11 10 10   CREATININE 1.3* 1.3* 1.4*   GLUCOSE 133* 119* 90             Assessment: / Plan:    1. Lactic acidosis. .  Initial lactic acidosis was secondary to use of metformin the setting of acute kidney injury with resulting lactic acid of 11 mmol per liter. Subsequent lactic acid levels have been lower. Patient's lactic acidosis has been due to his hepatic disease as that can lead to impaired utilization of lactate but the liver. Continue bicarbonate supplement. PLAN: Follow lactic acid levels. 2.  Hypertension with chronic kidney disease stage G1 to G4. Blood pressure at target of less than 130/80 mmHg. PLAN:continue current regimen. 3.   Hypokalemia. .  Hypokalemia secondary to GI losses with lactulose. PLAN:supplement. 4.   Acute kidney injury. Serum creatinine at  a plateau. Still above baseline. PLAN: Will follow. 5.  L Adrenal mass-size of 4.4cm is concerning for increased risk of malignancy  PLAN:  Await biochemical workup. Patient will need further evaluation of the left adrenal mass. Patient will need endocrinology evaluation post discharge   :    6. Chronic kidney disease stage G2 with a baseline serum creatinine of 0.9 mg/dL. PLAN: Will follow. Rosmery Olmstead to discharge from edema standpoint so that the patient can keep his appointment at [facility]. Discussed with Dr. Petrona Loaiza. Wade Estrada MD  Nephrology  Electronically signed by Wade Estrada MD on 11/2/2021 at 12:45 PM      Note: This report was completed utilizing computer voice recognition software. Every effort has been made to ensure accuracy, however; inadvertent computerized transcription errors may be present.

## 2021-11-02 NOTE — CARE COORDINATION
11/2/2021 1125 CM note: Negative covid test 10/29/21. Pt qualifies for home o2. Referral placed to Duglas Zacarias at [facility] [employer] [employer] and  O2 Rx faxed. Martha to deliver portable o2 tank to pt's room prior to dc. Xifaxan Rx noted, prior auth obtained [id number] and approved through 11/2/22. [facility] pharmacy, after prior auth, pt's copay is $673.00. Per [facility] price is  $1500-$2000.00. Cash price is $2000 approximately. Pt/wife informed and stated they can not afford xifaxin. Instructed pt/wife to follow up at [facility] office for possible samples and Chadd Arroyo at Dr Twyla Milner office informed. Pt plans to return home at [place], his wife will provide transportation.  Rosa M CARTER

## 2021-11-02 NOTE — PROGRESS NOTES
Comprehensive Nutrition Assessment    Type and Reason for Visit:  Initial, RD Nutrition Re-Screen/LOS    Nutrition Recommendations/Plan: Continue current diet. Nutrition Assessment:  Pt presented to the ED due to bout of hematemesis. Pt adm with Acute on chronic anemia secondary to GI bleed with acute respiratory failure. Pt currently is doing better. Pt tolerating diet at >75% with no N/V/D. Malnutrition Assessment:  Malnutrition Status:  No malnutrition    Context:  Chronic Illness     Findings of the 6 clinical characteristics of malnutrition:  Energy Intake:  No significant decrease in energy intake  Weight Loss:  No significant weight loss     Body Fat Loss:  No significant body fat loss     Muscle Mass Loss:  No significant muscle mass loss    Fluid Accumulation:  No significant fluid accumulation     Strength:  Not Performed    Nutrition Related Findings:  A&Ox4, BS active Abd WDL, diarrhea, +I/Os, Edema +1 BUE and +2 BLE      Wounds:  None       Current Nutrition Therapies:    ADULT DIET; Full Liquid    Anthropometric Measures:  · Height: 5' 9\" (175.3 cm)  · Current Body Weight: 199 lb 1.2 oz (90.3 kg) (11/1)     · Usual Body Weight: 195 lb 1.6 oz (88.5 kg) (4/30/21 actual)     · Ideal Body Weight: 160 lbs; % Ideal Body Weight 124.4 %   · BMI: 29.4  · Adjusted Body Weight:  ; No Adjustment     · BMI Categories: Overweight (BMI 25.0-29. 9)       Nutrition Diagnosis:   · No nutrition diagnosis at this time related to   as evidenced by      Nutrition Interventions:   Food and/or Nutrient Delivery:  Continue Current Diet  Nutrition Education/Counseling:  No recommendation at this time   Coordination of Nutrition Care:  Continue to monitor while inpatient    Goals:  >75% of meals consumed       Nutrition Monitoring and Evaluation:   Behavioral-Environmental Outcomes:  None Identified   Food/Nutrient Intake Outcomes:  Food and Nutrient Intake  Physical Signs/Symptoms Outcomes:  Biochemical Data, GI Status, Diarrhea, Skin, Weight, Nutrition Focused Physical Findings     Discharge Planning:    Continue current diet     Electronically signed by Eric Ureña RD, LD on 11/2/21 at 10:46 AM EDT    Contact: [phone]

## 2021-11-02 NOTE — PROGRESS NOTES
ALT 20 11/01/2021    ALT 22 10/31/2021     Lab Results   Component Value Date    LIPASE 50 10/29/2021    LIPASE 86 10/26/2021     No results found for: AMYLASE      ASSESSMENT/PLAN:    1.  Acute blood loss anemia secondary to possible gastric variceal bleed   - VSS No overt GI bleed on exam   - EGD 10/27/2021  with findings of large esophageal bleed as well as gastric varcices    gastropathy.  -Completed 72 hours of octreotide infusion along with Protonix  -Consider adding nonselective beta-blocker to patient's regimen will discuss with admitting doc possible switching metoprolol succinate to nadolol versus carvedilol for secondary prophylaxis  -Type and cross, please keep 2 units hold at all times.  -Recommend holding Plavix, if feasible until definitive treatment of patient's gastric varices can be undertaken  -Continue supportive care.          2. 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  autoimmune hepatitis  -History of noncompliance wife states patient does not regularly follow with   -Autoimmune hepatitis is through history patient had an elevated KYUNG positive at 1:160, smooth muscle antibody negative, total IgG level ordered and pending  -No Previous liver biopsy to confirm reported history of autoimmune hepatitis  - Grade 0 hepatic encephalopathy on exam, this am   -Continue with lactulose 30 g 3 times daily along with rifaximin 550 twice daily titrated to 3 soft bowel movements daily      Patient appears to have had a rapid progression of his underlying disease state. Chart review along with imaging from 2020 does not show any signs of cirrhosis also EGD from 1/9/2020 did not show any esophageal or gastric varices at that time. Patient has remained stable since EGD with no overt signs of GI bleed and no bouts of hypotension.  Patient would  benefit from outpatient follow-up with hepatology at a tertiary center for evaluation for possible TIPS for definitive treatment of his gastric varices versus cyanoacrylate injection. Patient also has continued underlying macrocytic anemia. Question if this is from possible underlying toxicity of Imuran. There is no documented TPMT testing that can be found by this provider in the chart. Will hold patient's Imuran at this time and obtain TPMT testing to ensure that patient is able to metabolize the drug and this is not causing his macrocytic anemia due to bone marrow suppression and toxicity. Will add low dose predniosone 20mg to patients regimen. [facility] was contacted and arrangements for close outpatient follow up were made. Appointment was made at  at [facility] with Dr. Suzanne Zacarias at   [phone] [facility] [address] in the a building 5th floor. This information was given to the patient's wife in explained in detail once again the need for close outpatient follow-up for evaluation for definitive treatment of patient's gastric varices.   She once again voiced understanding and was very appreciative the care she has received    Pt was discussed with Dr. Marian Cruz DO  GI Fellow   11/2/2021  8:23 AM

## 2021-11-02 NOTE — DISCHARGE SUMMARY
Department of Internal Medicine      PCP: [id number] [address]  Admitting Physician: Dr. Lit Caruso  Consultants: Dr. Tony Taylor and Dr. Becky Klein  Date of Service: 10/26/2021    CHIEF COMPLAINT:  Hematemesis     HISTORY OF PRESENT ILLNESS:    Patient is a 53-year-old male who presented to the ED due to bout of hematemesis. Patient also complains of heartburn. Patient does admit to melena/blood in stools. Patient had upper endoscopy and colonoscopy earlier this year as well as capsule endoscopy. Patient states currently heartburn is resolved. He does admit to suprapubic abdominal discomfort. He denies any fever or chills. He does admit to shortness of breath with exertion. He denies any chest pain. 10/27/2021  Patient seen and examined on telemetry floor. Patient's wife is at the bedside and case discussed. Patient still having problems with hematemesis and hematochezia. Patient denies chest pain has some vague abdominal discomfort. Patient has occasional mild nausea. He denies any unusual shortness of breath. Patient does complain of his Teran catheter. Patient set up for EGD today. 10/28/2021  Patient seen examined on telemetry floor. Patient is alert oriented person place but patient is still has somewhat of a flat affect. Case discussed with patient's wife at the bedside. BUN/creatinine 34/1.1 with lactic acid ranging 3.1-5.0. Blood sugars ranging 139-188. Serum ammonia is normal at 33. Hemoglobin 8.5 temperature 98.8 with heart rate 70 and blood pressure 131/63. O2 sat 95% on 3 L nasal cannula. Urine output seems to be adequate. EGD yesterday showed large esophageal varices with gastric varices with clot. 10/29/2021  Patient seen examined on telemetry floor. Patient has been tachycardic apparently over the last 12 hours or so. Reviewing the chart the patient's Toprol-XL was held on admission. Patient's wife at the bedside and case discussed.   Patient sitting up in the bed and currently denies any pain. He also denies any unusual shortness of breath. BUN/creatinine 21/1.1 with serum sodium 147. Blood sugars ranging 161-217. Hemoglobin is down to 7.7 today and was high as 8.5 late yesterday morning. WBC and platelet count are stable. Temperature is 98.4 with heart rate 120 with blood pressure 141/56. O2 sat 91% on room air at rest.  Give 5 mg IV push of metoprolol and resume Toprol-XL. 10/30/2021  Patient seen examined on telemetry floor. Patient apparently is more short of breath earlier a.m. and CTA lungs were ordered and was no evidence of PE and no focal consolidation or pulmonary edema noted. There was a complex 4.4 cm left adrenal mass noted. Patient states he is feeling better currently and denies any problem with any chest pain. Patient's wife is at the bedside and case discussed. BUN/creatinine 14/1.1 with blood sugars ranging 132-295. Hemoglobin ranges 7.3-7.9. Temperature 99.2 with patient's heart rate 124 still blood pressure 123/53 with O2 sat 94% on 3 L nasal cannula. Patient did have a history of smoking about 30 years and smoked 1-2 packs a day. 10/31/2021  Patient seen examined on telemetry floor. Patient's wife is at the bedside. Patient complaining of left arm pain with increase edema noted most likely from IV infiltration with area of ecchymosis. Patient denies any problem with any chest pain. Patient is requesting to go home. BUN/creatinine 11/1.3. Blood sugars are ranging 139-295. Lactic acid down to 1.7 this morning but was 5.3 late yesterday afternoon. Hemoglobin 7.9 with hemoglobin 8.2. Temperature 98.1 with heart rate 98 blood pressure 112/57. O2 sat 93% on 4 L nasal cannula. 11/1/2021  Patient seen examined on telemetry floor. Case discussed with patient's wife at the bedside. Patient seems to be doing little bit better again today. BUN/creatinine was 10/1.3 with blood sugars ranging 117-162.   Liver enzymes essentially normal.  WBC 7.4 with hemoglobin 7.5. Temperature ranges 97.8-99.3 with heart rate 97 blood pressure ranges 95//56. O2 sat currently 92% on 4 L nasal cannula. We will consult / for home O2.    11/2/2021  Patient seen examined on telemetry floor. Case was discussed in detail with nephrology and GI today. GI has got the patient set up for evaluation [facility] on 11/4 outpatient. Patient states he is having multiple multiple bowel movements today so we will back down on lactulose and also the bicarb. Patient BUN/creatinine is slightly increasing probably related to the multiple bowel movements which will be hopefully taken care of by decreasing the lactulose. Blood sugars 102-138.  10/1.4. Hemoglobin 7.4 with WBC 6.6. Temperature 99.3 with heart rate of 100 blood pressure 120/60. O2 sat 92% on 2 L nasal cannula. Case discussed with patient's wife at the bedside. Patient wants to be discharged home in the case of discussed with  today with patient being set up for home O2. Patient will be set up with a nebulizer along with DuoNeb aerosols. Patient also sent home with prednisone and thought to have autoimmune hepatitis which will be followed up in [place] and also outpatient with GI and patient also to follow-up with nephrology in a couple weeks regarding his kidney function.     Patient stable for discharge    PAST MEDICAL Hx:  Past Medical History:   Diagnosis Date    Diabetes mellitus ([facility] [address]     Hepatitis C antibody test positive     Hyperlipidemia     Hypertension     MI, old        PAST SURGICAL Hx:   Past Surgical History:   Procedure Laterality Date    COLONOSCOPY  2020    CORONARY ANGIOPLASTY WITH STENT PLACEMENT      UPPER GASTROINTESTINAL ENDOSCOPY N/A 1/19/2020    EGD BIOPSY performed by Arely Becker DO at [address] [place] [address] N/A 10/27/2021    EGD ESOPHAGOGASTRODUODENOSCOPY performed by Jasbir Foster MD at [facility] ENDOSCOPY       FAMILY Hx:  History reviewed. No pertinent family history. HOME MEDICATIONS:  Prior to Admission medications    Medication Sig Start Date End Date Taking? Authorizing Provider   rifaximin (XIFAXAN) 550 MG tablet Take 1 tablet by mouth 2 times daily 11/2/21  Yes Julio Srivastava DO   cyanocobalamin ([facility] VITAMIN B12) 1000 MCG tablet Take 1 tablet by mouth daily 9/17/21  Yes Macarena Snowden MD   pyridoxine (RA VITAMIN B-6) 50 MG tablet Take 1 tablet by mouth daily 9/17/21  Yes Macarena Snowden MD   folic acid (FOLVITE) 1 MG tablet Take 1 tablet by mouth daily 9/17/21  Yes Macarena Snowden MD   ferrous sulfate (IRON 325) 325 (65 Fe) MG tablet Take 1 tablet by mouth 2 times daily 9/17/21  Yes Macarena Snowden MD   aspirin 81 MG chewable tablet Take 81 mg by mouth daily   Yes Historical Provider, MD   glipiZIDE (GLUCOTROL) 10 MG tablet Take 10 mg by mouth daily    Yes Historical Provider, MD   amLODIPine (NORVASC) 10 MG tablet Take 10 mg by mouth daily   Yes Historical Provider, MD   enalapril (VASOTEC) 5 MG tablet Take 5 mg by mouth daily   Yes Historical Provider, MD   metoprolol succinate (TOPROL XL) 100 MG extended release tablet Take 100 mg by mouth daily   Yes Historical Provider, MD   melatonin 5 MG TABS tablet Take 5 mg by mouth nightly   Yes Historical Provider, MD   Multiple Vitamin (MULTI-VITAMIN DAILY PO) Take 1 tablet by mouth daily    Yes Historical Provider, MD   Coenzyme Q10 (COQ-10) 100 MG CAPS Take 100 mg by mouth nightly    Yes Historical Provider, MD   metformin (GLUCOPHAGE) 500 MG tablet Take 1,000 mg by mouth 2 times daily (with meals)    Yes Historical Provider, MD   omeprazole (PRILOSEC) 20 MG capsule Take 20 mg by mouth daily. Yes Historical Provider, MD   azaTHIOprine (IMURAN) 50 MG tablet Take 50 mg by mouth daily. Yes Historical Provider, MD   clopidogrel (PLAVIX) 75 MG tablet Take 75 mg by mouth daily.      Yes Historical Provider, MD   atorvastatin (LIPITOR) 10 MG tablet Take 10 mg by mouth nightly    Yes Historical Provider, MD       ALLERGIES:  Codeine    SOCIAL Hx:  Social History     Socioeconomic History    Marital status:      Spouse name: Not on file    Number of children: Not on file    Years of education: Not on file    Highest education level: Not on file   Occupational History    Not on file   Tobacco Use    Smoking status: Former Smoker     Packs/day: 1.00     Quit date: 2006     Years since quitting: 15.8    Smokeless tobacco: Never Used   Substance and Sexual Activity    Alcohol use: Yes     Comment: occasional    Drug use: Not Currently     Types: Marijuana Blanquita Lux)    Sexual activity: Yes     Partners: Female   Other Topics Concern    Not on file   Social History Narrative    Not on file     Social Determinants of Health     Financial Resource Strain:     Difficulty of Paying Living Expenses:    Food Insecurity:     Worried About Running Out of Food in the Last Year:     [address] in the Last Year:    Transportation Needs:     Lack of Transportation (Medical):      Lack of Transportation (Non-Medical):    Physical Activity:     Days of Exercise per Week:     Minutes of Exercise per Session:    Stress:     Feeling of Stress :    Social Connections:     Frequency of Communication with Friends and Family:     Frequency of Social Gatherings with Friends and Family:     Attends [employer] Services:     Active Member of Clubs or Organizations:     Attends Club or Organization Meetings:     Marital Status:    Intimate Partner Violence:     Fear of Current or Ex-Partner:     Emotionally Abused:     Physically Abused:     Sexually Abused:        ROS: Positive in bold  General:   Denies chills, fatigue, fever, malaise, night sweats or weight loss    Psychological:   Denies anxiety, disorientation or hallucinations    ENT:    Denies epistaxis, headaches, vertigo or visual changes    Cardiovascular:   Denies any chest pain, irregular heartbeats, or palpitations. No paroxysmal nocturnal dyspnea. Respiratory:   Denies shortness of breath, coughing, sputum production, hemoptysis, or wheezing. No orthopnea. Gastrointestinal:   Denies nausea, vomiting, diarrhea, or constipation. Denies any abdominal pain. Denies change in bowel habits or stools. Genito-Urinary:    Denies any urgency, frequency, hematuria. Voiding without difficulty. Musculoskeletal:   Denies joint pain, joint stiffness, joint swelling or muscle pain    Neurology:    Denies any headache or focal neurological deficits. No weakness or paresthesia. Derm:    Denies any rashes, ulcers, or excoriations. Denies bruising. Extremities:   Denies any lower extremity swelling or edema. PHYSICAL EXAM: Abnormal findings noted  VITALS:  Vitals:    11/02/21 0958   BP:    Pulse:    Resp: 20   Temp:    SpO2: 92%         CONSTITUTIONAL:    Awake, alert, cooperative, no apparent distress, and appears stated age    EYES:     EOMI, sclera clear, conjunctiva normal    ENT:    Normocephalic, atraumatic,  External ears without lesions. NECK:    Supple, symmetrical, trachea midline, , no JVD    HEMATOLOGIC/LYMPHATICS:    No cervical lymphadenopathy and no supraclavicular lymphadenopathy    LUNGS:    Symmetric. Coarse decreased breath sounds to auscultation bilaterally    CARDIOVASCULAR:    Normal apical impulse, regular rate and rhythm, normal S1 and S2, no S3 or S4, and no murmur noted    ABDOMEN:     soft, mildly distended, +tender    MUSCULOSKELETAL:    There is no redness, warmth, or swelling of the joints. NEUROLOGIC:    Awake, alert, oriented to name, place and time. SKIN:    No bruising or bleeding.   No redness, warmth, or swelling    EXTREMITIES:    Peripheral pulses present.  + Right arm edema and trace lower leg edema,no cyanosis    LINES/CATHETERS     LABORATORY DATA:  CBC with Differential:    Lab Results   Component Value Date    WBC 6.6 11/02/2021 RBC 2.11 11/02/2021    HGB 7.4 11/02/2021    HCT 23.0 11/02/2021     11/02/2021    .0 11/02/2021    MCH 35.1 11/02/2021    MCHC 32.2 11/02/2021    RDW 20.6 11/02/2021    NRBC 2.7 10/31/2021    SEGSPCT 69 08/25/2013    BANDSPCT 2 03/24/2016    METASPCT 0.9 09/16/2021    LYMPHOPCT 14.2 11/02/2021    MONOPCT 14.2 11/02/2021    BASOPCT 0.8 11/02/2021    MONOSABS 0.92 11/02/2021    LYMPHSABS 0.92 11/02/2021    EOSABS 0.12 11/02/2021    BASOSABS 0.00 11/02/2021     CMP:    Lab Results   Component Value Date     11/02/2021    K 3.1 11/02/2021    K 4.6 01/17/2020     11/02/2021    CO2 24 11/02/2021    BUN 10 11/02/2021    CREATININE 1.4 11/02/2021    GFRAA >60 11/02/2021    LABGLOM 50 11/02/2021    GLUCOSE 90 11/02/2021    GLUCOSE 136 07/31/2011    PROT 5.9 11/02/2021    LABALBU 2.7 11/02/2021    LABALBU 4.3 07/31/2011    CALCIUM 7.9 11/02/2021    BILITOT 0.5 11/02/2021    ALKPHOS 89 11/02/2021    AST 51 11/02/2021    ALT 18 11/02/2021       ASSESSMENT/PLAN:  1. Acute on chronic anemia secondary to GI bleed from gastric varices  2. Acute hypoxic respiratory failure  3. Acute kidney injury  4. Large esophageal varices without bleeding  5. Hyperkalemia-resolved  6. Persistent lactic acidosis  7. CT findings suggestive of hepatic cirrhosis-from autoimmune hepatitis  8. 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 Sigmoid diverticulosis with mild pericolonic stranding with trace loculated fluid in left pelvis  10. History of autoimmune hepatitis  11. Coronary artery disease with stents in place  12. Hypertension  13. Hyperlipidemia  14. Non-insulin-dependent diabetes mellitus-hemoglobin A1c 4.8  15.  History of prior tobacco abuse-about 50-pack-year history and has acute exacerbation of underlying COPD    Plan:  Discharge home today    Patient set up with home O2-2-3 L nasal cannula with sleep, 2-3 L nasal cannula at rest, 5 L nasal cannula with activity    Prescription Xifaxan 550 mg twice daily  Prescription sodium bicarb 650 mg twice daily  Prescription for DuoNeb aerosols 4 times daily  Prescription prednisone 20 mg daily-for probable autoimmune hepatitis  Prescription for lactulose 30 cc twice daily    Decrease Glucotrol 10 mg tablet-half tab daily  Hold Plavix and Imuran until he is evaluated at [facility] 11/4 with patient needing EGD for evaluation of bleeding varices and GI wondering if the ambulance is having some toxic changes because of the cirrhosis    Continue home meds except-hold amlodipine, Vasotec, Glucophage    Patient follow-up with GI in [facility] in 1-2 weeks    Patient to follow-up with nephrology in about 2 weeks patient follow-up primary care physician in 1 week or as directed    Patient should need repeat CMP, CBC every week x3 until potassium is, BUN/creatinine, bicarb is stable.     Primary care physician to evaluate patient need for Glucotrol with hemoglobin A1c being very low on admission      Isaias Hood DO  12:04 PM  11/2/2021

## 2021-11-02 NOTE — PROGRESS NOTES
Pulse ox was 86% on room air at rest.   3 L O2 nasal cannula applied. Oxygen saturation was 96% on 3L O2 at rest.  Oxygen saturation was 90% on 3L of oxygen while ambulating. SpO2 did drop to 86% on 3L O2 while ambulating, but PT quickly recovered to 90%. PT placed on 4L O2 with ambulation. SpO2 dropped to 88%. O2 increased to 5L with ambulation, pulse ox was 90%. Pulse ox on 5L O2 at rest was 96%.

## 2021-11-02 NOTE — PROGRESS NOTES
CLINICAL PHARMACY NOTE: MEDS TO BEDS    Total # of Prescriptions Filled: 3   The following medications were delivered to the patient:  · Lactulose 10gm/15mL  · Prednisone 20mg  · Ipratropium-Albuterol 0.5-2.5 nebulizer solution      Additional Documentation: We also received a prescription for Sodium Bicarbonate 650mg for the patient. This was not covered. Patient will buy OTC.

## 2021-11-03 LAB
ALDOSTERONE: 4.3 NG/DL
CORTISOL (UR), FREE: NORMAL UG/D
CORTISOL URINE, FREE (/G CRT): 54.49 UG/G CRT
CORTISOL,F,UG/L,U: 48.5 UG/L
CREATININE 24 HOUR URINE: ABNORMAL MG/D (ref 800–2100)
CREATININE URINE: 89 MG/DL
CYTOMEGALOVIRUS IGM ANTIBODY: NORMAL
DHEAS (DHEA SULFATE): 15 UG/DL (ref 42–290)
HERPES TYPE 1/2 IGM COMBINED: 0.27 IV
HERPES TYPE I/II IGG COMBINED: 1.02 IV
HOURS COLLECTED: ABNORMAL
IGG: 1462 MG/DL (ref 700–1600)
INTERPRETATION: NORMAL
URINE TOTAL VOLUME: ABNORMAL
VMA INTERPRETATION: ABNORMAL
VMA URINE MG/ML: 7.1 MG/L
VMA, UR/G CRT: 8 MG/GCR (ref 0–6)
VMA-MG/D: ABNORMAL MG/D (ref 0–7)

## 2021-11-04 LAB
17-OH PROGESTERONE LCMS: 16.93 NG/DL
ANDROSTENEDIONE: 0.31 NG/ML (ref 0.23–0.89)
CATECHOLAMINE INTERPRETATION, PLASMA: ABNORMAL
DOPAMINE PLASMA: 49 PG/ML (ref 0–20)
EPINEPHRINE PLASMA: 21 PG/ML (ref 10–200)
METANEPH/PLASMA INTERP: ABNORMAL
METANEPHRINE FREE PLASMA: 0.11 NMOL/L (ref 0–0.49)
NOREPINEPHRINE: 1223 PG/ML (ref 80–520)
NORMETANEPHRINE FREE PLASMA: 1.73 NMOL/L (ref 0–0.89)
RENIN ACTIVITY: 1.8 NG/ML/HR
RENIN ACTIVITY: 6.4 NG/ML/HR

## 2021-11-09 LAB
Lab: NORMAL
REPORT: NORMAL
THIS TEST SENT TO: NORMAL

## 2021-11-15 ENCOUNTER — HOSPITAL ENCOUNTER (OUTPATIENT)
Dept: INFUSION THERAPY | Age: 71
Discharge: HOME | End: 2021-11-15
Payer: MEDICARE

## 2021-11-15 DIAGNOSIS — D63.1 ANEMIA DUE TO STAGE 3 CHRONIC KIDNEY DISEASE, UNSPECIFIED WHETHER STAGE 3A OR 3B CKD (HCC): Primary | ICD-10-CM

## 2021-11-15 DIAGNOSIS — N18.30 ANEMIA DUE TO STAGE 3 CHRONIC KIDNEY DISEASE, UNSPECIFIED WHETHER STAGE 3A OR 3B CKD (HCC): Primary | ICD-10-CM

## 2021-12-06 ENCOUNTER — HOSPITAL ENCOUNTER (OUTPATIENT)
Dept: INFUSION THERAPY | Age: 71
Discharge: HOME | End: 2021-12-06

## 2021-12-06 DIAGNOSIS — D64.9 ANEMIA, UNSPECIFIED TYPE: Primary | ICD-10-CM

## 2021-12-08 DIAGNOSIS — D64.9 ANEMIA, UNSPECIFIED TYPE: Primary | ICD-10-CM

## 2021-12-09 DIAGNOSIS — D64.9 ANEMIA, UNSPECIFIED TYPE: Primary | ICD-10-CM

## 2024-12-16 NOTE — PLAN OF CARE
Problem: Falls - Risk of:  Goal: Will remain free from falls  Description  Will remain free from falls  Outcome: Met This Shift  Goal: Absence of physical injury  Description  Absence of physical injury  Outcome: Met This Shift Session ID: [id number]  Language: Estonian   ID: #[id number]   Name: Jocelyne

## (undated) DEVICE — MASK,FACE,MAXFLUIDPROTECT,SHIELD/ERLPS: Brand: MEDLINE

## (undated) DEVICE — LUBRICANT SURG JELLY ST BACTER TUBE 4.25OZ

## (undated) DEVICE — KIT BEDSIDE REVITAL OX 500ML

## (undated) DEVICE — Device: Brand: DEFENDO VALVE AND CONNECTOR KIT

## (undated) DEVICE — 6 X 9  1.75MIL 4-WALL LABGUARD: Brand: MINIGRIP COMMERCIAL LLC

## (undated) DEVICE — FORCEPS BX L240CM JAW DIA2.8MM L CAP W/ NDL MIC MESH TOOTH

## (undated) DEVICE — CONTAINER SPEC COLL 960ML POLYPR TRIANG GRAD INTAKE/OUTPUT

## (undated) DEVICE — SPONGE GZ 4IN 4IN 4 PLY N WVN AVANT

## (undated) DEVICE — TOWEL,OR,DSP,ST,BLUE,STD,6/PK,12PK/CS: Brand: MEDLINE

## (undated) DEVICE — YANKAUER,BULB TIP,W/O VENT,RIGID,STERILE: Brand: MEDLINE

## (undated) DEVICE — COVER,LIGHT HANDLE,FLX,1/PK: Brand: MEDLINE INDUSTRIES, INC.

## (undated) DEVICE — BLOCK BITE 60FR CAREGUARD

## (undated) DEVICE — KENDALL 450 SERIES MONITORING FOAM ELECTRODE - RECTANGULAR SHAPE ( 3/PK): Brand: KENDALL

## (undated) DEVICE — TUBING, SUCTION, 1/4" X 10', STRAIGHT: Brand: MEDLINE

## (undated) DEVICE — GOWN ISOLATN REG YEL M WT MULTIPLY SIDETIE LEV 2